# Patient Record
Sex: FEMALE | Race: WHITE | Employment: UNEMPLOYED | URBAN - METROPOLITAN AREA
[De-identification: names, ages, dates, MRNs, and addresses within clinical notes are randomized per-mention and may not be internally consistent; named-entity substitution may affect disease eponyms.]

---

## 2017-05-10 ENCOUNTER — IP HISTORICAL/CONVERTED ENCOUNTER (OUTPATIENT)
Dept: OTHER | Age: 62
End: 2017-05-10

## 2017-05-26 ENCOUNTER — IP HISTORICAL/CONVERTED ENCOUNTER (OUTPATIENT)
Dept: OTHER | Age: 62
End: 2017-05-26

## 2017-06-16 ENCOUNTER — ED HISTORICAL/CONVERTED ENCOUNTER (OUTPATIENT)
Dept: OTHER | Age: 62
End: 2017-06-16

## 2018-01-02 ENCOUNTER — ED HISTORICAL/CONVERTED ENCOUNTER (OUTPATIENT)
Dept: OTHER | Age: 63
End: 2018-01-02

## 2018-01-27 ENCOUNTER — IP HISTORICAL/CONVERTED ENCOUNTER (OUTPATIENT)
Dept: OTHER | Age: 63
End: 2018-01-27

## 2018-03-23 ENCOUNTER — ED HISTORICAL/CONVERTED ENCOUNTER (OUTPATIENT)
Dept: OTHER | Age: 63
End: 2018-03-23

## 2018-08-29 ENCOUNTER — OP HISTORICAL/CONVERTED ENCOUNTER (OUTPATIENT)
Dept: OTHER | Age: 63
End: 2018-08-29

## 2018-09-05 ENCOUNTER — OP HISTORICAL/CONVERTED ENCOUNTER (OUTPATIENT)
Dept: OTHER | Age: 63
End: 2018-09-05

## 2018-09-12 ENCOUNTER — OP HISTORICAL/CONVERTED ENCOUNTER (OUTPATIENT)
Dept: OTHER | Age: 63
End: 2018-09-12

## 2018-10-02 ENCOUNTER — OP HISTORICAL/CONVERTED ENCOUNTER (OUTPATIENT)
Dept: OTHER | Age: 63
End: 2018-10-02

## 2018-10-16 ENCOUNTER — OP HISTORICAL/CONVERTED ENCOUNTER (OUTPATIENT)
Dept: OTHER | Age: 63
End: 2018-10-16

## 2018-11-21 ENCOUNTER — OP HISTORICAL/CONVERTED ENCOUNTER (OUTPATIENT)
Dept: OTHER | Age: 63
End: 2018-11-21

## 2022-03-14 ENCOUNTER — HOSPITAL ENCOUNTER (INPATIENT)
Age: 67
LOS: 7 days | Discharge: HOME HEALTH | DRG: 871 | End: 2022-03-21
Attending: EMERGENCY MEDICINE | Admitting: HOSPITALIST
Payer: MEDICARE

## 2022-03-14 ENCOUNTER — APPOINTMENT (OUTPATIENT)
Dept: CT IMAGING | Age: 67
DRG: 871 | End: 2022-03-14
Attending: EMERGENCY MEDICINE
Payer: MEDICARE

## 2022-03-14 ENCOUNTER — APPOINTMENT (OUTPATIENT)
Dept: GENERAL RADIOLOGY | Age: 67
DRG: 871 | End: 2022-03-14
Attending: EMERGENCY MEDICINE
Payer: MEDICARE

## 2022-03-14 DIAGNOSIS — N39.0 URINARY TRACT INFECTION WITHOUT HEMATURIA, SITE UNSPECIFIED: ICD-10-CM

## 2022-03-14 DIAGNOSIS — R50.9 FEVER, UNSPECIFIED FEVER CAUSE: ICD-10-CM

## 2022-03-14 DIAGNOSIS — G62.9 PERIPHERAL POLYNEUROPATHY: ICD-10-CM

## 2022-03-14 DIAGNOSIS — Z78.9 FAILURE OF OUTPATIENT TREATMENT: ICD-10-CM

## 2022-03-14 DIAGNOSIS — R41.82 ALTERED MENTAL STATUS, UNSPECIFIED ALTERED MENTAL STATUS TYPE: Primary | ICD-10-CM

## 2022-03-14 PROBLEM — A41.9 SEPSIS (HCC): Status: ACTIVE | Noted: 2022-03-14

## 2022-03-14 LAB
AMPHET UR QL SCN: NEGATIVE
ANION GAP SERPL CALC-SCNC: 7 MMOL/L (ref 5–15)
APPEARANCE UR: ABNORMAL
BACTERIA URNS QL MICRO: NEGATIVE /HPF
BACTERIA URNS QL MICRO: NEGATIVE /HPF
BARBITURATES UR QL SCN: POSITIVE
BASOPHILS # BLD: 0.1 K/UL (ref 0–0.1)
BASOPHILS NFR BLD: 0 % (ref 0–1)
BENZODIAZ UR QL: NEGATIVE
BILIRUB UR QL: NEGATIVE
BUN SERPL-MCNC: 15 MG/DL (ref 6–20)
BUN/CREAT SERPL: 15 (ref 12–20)
CA-I BLD-MCNC: 8.6 MG/DL (ref 8.5–10.1)
CANNABINOIDS UR QL SCN: NEGATIVE
CHLORIDE SERPL-SCNC: 102 MMOL/L (ref 97–108)
CK SERPL-CCNC: 139 U/L (ref 26–192)
CO2 SERPL-SCNC: 23 MMOL/L (ref 21–32)
COCAINE UR QL SCN: NEGATIVE
COLOR UR: ABNORMAL
COVID-19 RAPID TEST, COVR: NOT DETECTED
CREAT SERPL-MCNC: 1.01 MG/DL (ref 0.55–1.02)
DIFFERENTIAL METHOD BLD: ABNORMAL
DRUG SCRN COMMENT,DRGCM: ABNORMAL
EOSINOPHIL # BLD: 0 K/UL (ref 0–0.4)
EOSINOPHIL NFR BLD: 0 % (ref 0–7)
ERYTHROCYTE [DISTWIDTH] IN BLOOD BY AUTOMATED COUNT: 13.4 % (ref 11.5–14.5)
FLUAV AG NPH QL IA: NEGATIVE
FLUBV AG NOSE QL IA: NEGATIVE
GLUCOSE BLD STRIP.AUTO-MCNC: 163 MG/DL (ref 65–117)
GLUCOSE SERPL-MCNC: 184 MG/DL (ref 65–100)
GLUCOSE UR STRIP.AUTO-MCNC: NEGATIVE MG/DL
HCT VFR BLD AUTO: 38.6 % (ref 35–47)
HGB BLD-MCNC: 12.4 G/DL (ref 11.5–16)
HGB UR QL STRIP: ABNORMAL
IMM GRANULOCYTES # BLD AUTO: 0.3 K/UL (ref 0–0.04)
IMM GRANULOCYTES NFR BLD AUTO: 1 % (ref 0–0.5)
KETONES UR QL STRIP.AUTO: NEGATIVE MG/DL
LACTATE SERPL-SCNC: 1.7 MMOL/L (ref 0.4–2)
LEUKOCYTE ESTERASE UR QL STRIP.AUTO: ABNORMAL
LYMPHOCYTES # BLD: 1.3 K/UL (ref 0.8–3.5)
LYMPHOCYTES NFR BLD: 5 % (ref 12–49)
MCH RBC QN AUTO: 29.8 PG (ref 26–34)
MCHC RBC AUTO-ENTMCNC: 32.1 G/DL (ref 30–36.5)
MCV RBC AUTO: 92.8 FL (ref 80–99)
METHADONE UR QL: NEGATIVE
MONOCYTES # BLD: 2 K/UL (ref 0–1)
MONOCYTES NFR BLD: 8 % (ref 5–13)
MUCOUS THREADS URNS QL MICRO: ABNORMAL /LPF
NEUTS SEG # BLD: 21.2 K/UL (ref 1.8–8)
NEUTS SEG NFR BLD: 86 % (ref 32–75)
NITRITE UR QL STRIP.AUTO: NEGATIVE
NRBC # BLD: 0 K/UL (ref 0–0.01)
NRBC BLD-RTO: 0 PER 100 WBC
OPIATES UR QL: NEGATIVE
OTHER URINE MICRO,5051: 5
OTHER,OTHU: ABNORMAL
PCP UR QL: NEGATIVE
PERFORMED BY, TECHID: ABNORMAL
PH UR STRIP: 5 [PH] (ref 5–8)
PLATELET # BLD AUTO: 276 K/UL (ref 150–400)
PMV BLD AUTO: 10.6 FL (ref 8.9–12.9)
POTASSIUM SERPL-SCNC: 3.8 MMOL/L (ref 3.5–5.1)
PROT UR STRIP-MCNC: 100 MG/DL
RBC # BLD AUTO: 4.16 M/UL (ref 3.8–5.2)
RBC #/AREA URNS HPF: 20 /HPF (ref 0–5)
RBC #/AREA URNS HPF: ABNORMAL /HPF (ref 0–5)
SODIUM SERPL-SCNC: 132 MMOL/L (ref 136–145)
SP GR UR REFRACTOMETRY: 1.01 (ref 1–1.03)
TROPONIN-HIGH SENSITIVITY: 5 NG/L (ref 0–51)
TSH SERPL DL<=0.05 MIU/L-ACNC: 0.7 UIU/ML (ref 0.36–3.74)
URATE SERPL-MCNC: 5.9 MG/DL (ref 2.6–6)
UROBILINOGEN UR QL STRIP.AUTO: 0.1 EU/DL (ref 0.1–1)
WBC # BLD AUTO: 24.8 K/UL (ref 3.6–11)
WBC URNS QL MICRO: 3858 /HPF (ref 0–4)
WBC URNS QL MICRO: >100 /HPF (ref 0–4)

## 2022-03-14 PROCEDURE — 87635 SARS-COV-2 COVID-19 AMP PRB: CPT

## 2022-03-14 PROCEDURE — 70450 CT HEAD/BRAIN W/O DYE: CPT

## 2022-03-14 PROCEDURE — 83605 ASSAY OF LACTIC ACID: CPT

## 2022-03-14 PROCEDURE — 87804 INFLUENZA ASSAY W/OPTIC: CPT

## 2022-03-14 PROCEDURE — 74011000250 HC RX REV CODE- 250: Performed by: EMERGENCY MEDICINE

## 2022-03-14 PROCEDURE — 74011250636 HC RX REV CODE- 250/636: Performed by: EMERGENCY MEDICINE

## 2022-03-14 PROCEDURE — 74011250636 HC RX REV CODE- 250/636: Performed by: HOSPITALIST

## 2022-03-14 PROCEDURE — 80048 BASIC METABOLIC PNL TOTAL CA: CPT

## 2022-03-14 PROCEDURE — 36415 COLL VENOUS BLD VENIPUNCTURE: CPT

## 2022-03-14 PROCEDURE — 65270000029 HC RM PRIVATE

## 2022-03-14 PROCEDURE — 71045 X-RAY EXAM CHEST 1 VIEW: CPT

## 2022-03-14 PROCEDURE — 93005 ELECTROCARDIOGRAM TRACING: CPT

## 2022-03-14 PROCEDURE — 74011250637 HC RX REV CODE- 250/637: Performed by: HOSPITALIST

## 2022-03-14 PROCEDURE — 87040 BLOOD CULTURE FOR BACTERIA: CPT

## 2022-03-14 PROCEDURE — 96374 THER/PROPH/DIAG INJ IV PUSH: CPT

## 2022-03-14 PROCEDURE — 85025 COMPLETE CBC W/AUTO DIFF WBC: CPT

## 2022-03-14 PROCEDURE — 84443 ASSAY THYROID STIM HORMONE: CPT

## 2022-03-14 PROCEDURE — 74011250637 HC RX REV CODE- 250/637: Performed by: EMERGENCY MEDICINE

## 2022-03-14 PROCEDURE — 80307 DRUG TEST PRSMV CHEM ANLYZR: CPT

## 2022-03-14 PROCEDURE — 81001 URINALYSIS AUTO W/SCOPE: CPT

## 2022-03-14 PROCEDURE — 99285 EMERGENCY DEPT VISIT HI MDM: CPT

## 2022-03-14 PROCEDURE — 74011000250 HC RX REV CODE- 250: Performed by: HOSPITALIST

## 2022-03-14 PROCEDURE — 84484 ASSAY OF TROPONIN QUANT: CPT

## 2022-03-14 PROCEDURE — 82962 GLUCOSE BLOOD TEST: CPT

## 2022-03-14 PROCEDURE — 82550 ASSAY OF CK (CPK): CPT

## 2022-03-14 PROCEDURE — 84550 ASSAY OF BLOOD/URIC ACID: CPT

## 2022-03-14 RX ORDER — ENOXAPARIN SODIUM 100 MG/ML
40 INJECTION SUBCUTANEOUS DAILY
Status: DISCONTINUED | OUTPATIENT
Start: 2022-03-15 | End: 2022-03-21 | Stop reason: HOSPADM

## 2022-03-14 RX ORDER — LANOLIN ALCOHOL/MO/W.PET/CERES
3 CREAM (GRAM) TOPICAL
Status: DISCONTINUED | OUTPATIENT
Start: 2022-03-14 | End: 2022-03-21 | Stop reason: HOSPADM

## 2022-03-14 RX ORDER — PROPRANOLOL HYDROCHLORIDE 60 MG/1
60 TABLET ORAL 3 TIMES DAILY
COMMUNITY
Start: 2021-12-20

## 2022-03-14 RX ORDER — IBUPROFEN 600 MG/1
600 TABLET ORAL
Status: COMPLETED | OUTPATIENT
Start: 2022-03-14 | End: 2022-03-14

## 2022-03-14 RX ORDER — METFORMIN HYDROCHLORIDE 500 MG/1
500 TABLET, EXTENDED RELEASE ORAL DAILY
Status: DISCONTINUED | OUTPATIENT
Start: 2022-03-15 | End: 2022-03-21 | Stop reason: HOSPADM

## 2022-03-14 RX ORDER — ONDANSETRON 2 MG/ML
4 INJECTION INTRAMUSCULAR; INTRAVENOUS
Status: DISCONTINUED | OUTPATIENT
Start: 2022-03-14 | End: 2022-03-21 | Stop reason: HOSPADM

## 2022-03-14 RX ORDER — SODIUM CHLORIDE 0.9 % (FLUSH) 0.9 %
5-40 SYRINGE (ML) INJECTION AS NEEDED
Status: DISCONTINUED | OUTPATIENT
Start: 2022-03-14 | End: 2022-03-21 | Stop reason: HOSPADM

## 2022-03-14 RX ORDER — ALOGLIPTIN 25 MG/1
25 TABLET, FILM COATED ORAL DAILY
Status: DISCONTINUED | OUTPATIENT
Start: 2022-03-15 | End: 2022-03-21 | Stop reason: HOSPADM

## 2022-03-14 RX ORDER — ACETAMINOPHEN 325 MG/1
650 TABLET ORAL
Status: DISCONTINUED | OUTPATIENT
Start: 2022-03-14 | End: 2022-03-21 | Stop reason: HOSPADM

## 2022-03-14 RX ORDER — MELATONIN
2000 DAILY
Status: DISCONTINUED | OUTPATIENT
Start: 2022-03-15 | End: 2022-03-21 | Stop reason: HOSPADM

## 2022-03-14 RX ORDER — PRIMIDONE 250 MG/1
250 TABLET ORAL 3 TIMES DAILY
COMMUNITY
Start: 2022-01-31

## 2022-03-14 RX ORDER — VENLAFAXINE HYDROCHLORIDE 75 MG/1
75 CAPSULE, EXTENDED RELEASE ORAL 3 TIMES DAILY
COMMUNITY
Start: 2022-03-06

## 2022-03-14 RX ORDER — ONDANSETRON 4 MG/1
4 TABLET, ORALLY DISINTEGRATING ORAL
Status: DISCONTINUED | OUTPATIENT
Start: 2022-03-14 | End: 2022-03-21 | Stop reason: HOSPADM

## 2022-03-14 RX ORDER — ACETAMINOPHEN 650 MG/1
650 SUPPOSITORY RECTAL
Status: DISCONTINUED | OUTPATIENT
Start: 2022-03-14 | End: 2022-03-21 | Stop reason: HOSPADM

## 2022-03-14 RX ORDER — LOSARTAN POTASSIUM 25 MG/1
25 TABLET ORAL DAILY
Status: DISCONTINUED | OUTPATIENT
Start: 2022-03-15 | End: 2022-03-20

## 2022-03-14 RX ORDER — VENLAFAXINE HYDROCHLORIDE 75 MG/1
75 CAPSULE, EXTENDED RELEASE ORAL 2 TIMES DAILY
Status: DISCONTINUED | OUTPATIENT
Start: 2022-03-14 | End: 2022-03-21 | Stop reason: HOSPADM

## 2022-03-14 RX ORDER — PRIMIDONE 250 MG/1
250 TABLET ORAL 3 TIMES DAILY
Status: DISCONTINUED | OUTPATIENT
Start: 2022-03-14 | End: 2022-03-21 | Stop reason: HOSPADM

## 2022-03-14 RX ORDER — METFORMIN HYDROCHLORIDE 500 MG/1
500 TABLET, EXTENDED RELEASE ORAL DAILY
COMMUNITY
Start: 2022-03-12

## 2022-03-14 RX ORDER — RISPERIDONE 1 MG/1
4 TABLET, FILM COATED ORAL
Status: DISCONTINUED | OUTPATIENT
Start: 2022-03-14 | End: 2022-03-21 | Stop reason: HOSPADM

## 2022-03-14 RX ORDER — ASPIRIN 81 MG/1
81 TABLET ORAL DAILY
Status: DISCONTINUED | OUTPATIENT
Start: 2022-03-15 | End: 2022-03-21 | Stop reason: HOSPADM

## 2022-03-14 RX ORDER — BACLOFEN 10 MG/1
10 TABLET ORAL 2 TIMES DAILY
Status: DISCONTINUED | OUTPATIENT
Start: 2022-03-14 | End: 2022-03-21 | Stop reason: HOSPADM

## 2022-03-14 RX ORDER — LEVOTHYROXINE SODIUM 100 UG/1
100 TABLET ORAL
Status: DISCONTINUED | OUTPATIENT
Start: 2022-03-15 | End: 2022-03-21 | Stop reason: HOSPADM

## 2022-03-14 RX ORDER — TRAZODONE HYDROCHLORIDE 150 MG/1
150 TABLET ORAL
COMMUNITY
Start: 2022-02-22 | End: 2022-03-21

## 2022-03-14 RX ORDER — GABAPENTIN 300 MG/1
300 CAPSULE ORAL 3 TIMES DAILY
Status: DISCONTINUED | OUTPATIENT
Start: 2022-03-14 | End: 2022-03-21 | Stop reason: HOSPADM

## 2022-03-14 RX ORDER — CLONAZEPAM 0.5 MG/1
0.5 TABLET ORAL 3 TIMES DAILY
Status: DISCONTINUED | OUTPATIENT
Start: 2022-03-14 | End: 2022-03-21 | Stop reason: HOSPADM

## 2022-03-14 RX ORDER — INSULIN LISPRO 100 [IU]/ML
INJECTION, SOLUTION INTRAVENOUS; SUBCUTANEOUS
Status: DISCONTINUED | OUTPATIENT
Start: 2022-03-14 | End: 2022-03-21 | Stop reason: HOSPADM

## 2022-03-14 RX ORDER — DEXTROSE MONOHYDRATE 100 MG/ML
0-250 INJECTION, SOLUTION INTRAVENOUS AS NEEDED
Status: DISCONTINUED | OUTPATIENT
Start: 2022-03-14 | End: 2022-03-21 | Stop reason: HOSPADM

## 2022-03-14 RX ORDER — SODIUM CHLORIDE 0.9 % (FLUSH) 0.9 %
5-40 SYRINGE (ML) INJECTION EVERY 8 HOURS
Status: DISCONTINUED | OUTPATIENT
Start: 2022-03-14 | End: 2022-03-21 | Stop reason: HOSPADM

## 2022-03-14 RX ORDER — RISPERIDONE 4 MG/1
4 TABLET, FILM COATED ORAL
COMMUNITY
Start: 2021-12-31

## 2022-03-14 RX ORDER — LOSARTAN POTASSIUM 25 MG/1
25 TABLET ORAL DAILY
COMMUNITY
Start: 2022-01-02

## 2022-03-14 RX ORDER — FENOFIBRATE 54 MG/1
54 TABLET ORAL DAILY
COMMUNITY
Start: 2022-02-05

## 2022-03-14 RX ORDER — SODIUM CHLORIDE 9 MG/ML
125 INJECTION, SOLUTION INTRAVENOUS CONTINUOUS
Status: DISCONTINUED | OUTPATIENT
Start: 2022-03-14 | End: 2022-03-16

## 2022-03-14 RX ORDER — PROPRANOLOL HYDROCHLORIDE 60 MG/1
60 TABLET ORAL 3 TIMES DAILY
Status: DISCONTINUED | OUTPATIENT
Start: 2022-03-14 | End: 2022-03-16

## 2022-03-14 RX ORDER — GABAPENTIN 300 MG/1
300 CAPSULE ORAL 3 TIMES DAILY
COMMUNITY
Start: 2022-02-01

## 2022-03-14 RX ORDER — ACETAMINOPHEN 325 MG/1
650 TABLET ORAL
Status: COMPLETED | OUTPATIENT
Start: 2022-03-14 | End: 2022-03-14

## 2022-03-14 RX ORDER — LANOLIN ALCOHOL/MO/W.PET/CERES
1000 CREAM (GRAM) TOPICAL DAILY
Status: DISCONTINUED | OUTPATIENT
Start: 2022-03-15 | End: 2022-03-21 | Stop reason: HOSPADM

## 2022-03-14 RX ORDER — MAGNESIUM SULFATE 100 %
4 CRYSTALS MISCELLANEOUS AS NEEDED
Status: DISCONTINUED | OUTPATIENT
Start: 2022-03-14 | End: 2022-03-21 | Stop reason: HOSPADM

## 2022-03-14 RX ORDER — B-COMPLEX WITH VITAMIN C
1 TABLET ORAL DAILY
Status: DISCONTINUED | OUTPATIENT
Start: 2022-03-15 | End: 2022-03-21 | Stop reason: HOSPADM

## 2022-03-14 RX ADMIN — WATER 1 G: 1 INJECTION INTRAMUSCULAR; INTRAVENOUS; SUBCUTANEOUS at 23:02

## 2022-03-14 RX ADMIN — ACETAMINOPHEN 650 MG: 325 TABLET ORAL at 14:35

## 2022-03-14 RX ADMIN — SODIUM CHLORIDE, PRESERVATIVE FREE 10 ML: 5 INJECTION INTRAVENOUS at 23:06

## 2022-03-14 RX ADMIN — VENLAFAXINE HYDROCHLORIDE 75 MG: 75 CAPSULE, EXTENDED RELEASE ORAL at 22:53

## 2022-03-14 RX ADMIN — BACLOFEN 10 MG: 10 TABLET ORAL at 22:53

## 2022-03-14 RX ADMIN — PROPRANOLOL HYDROCHLORIDE 60 MG: 60 TABLET ORAL at 21:33

## 2022-03-14 RX ADMIN — CLONAZEPAM 0.5 MG: 1 TABLET ORAL at 22:44

## 2022-03-14 RX ADMIN — MELATONIN TAB 3 MG 3 MG: 3 TAB at 22:44

## 2022-03-14 RX ADMIN — CEFTRIAXONE 1 G: 1 INJECTION, POWDER, FOR SOLUTION INTRAMUSCULAR; INTRAVENOUS at 14:35

## 2022-03-14 RX ADMIN — PRIMIDONE 250 MG: 250 TABLET ORAL at 22:53

## 2022-03-14 RX ADMIN — SODIUM CHLORIDE 1000 ML: 9 INJECTION, SOLUTION INTRAVENOUS at 23:05

## 2022-03-14 RX ADMIN — SODIUM CHLORIDE 1000 ML: 9 INJECTION, SOLUTION INTRAVENOUS at 14:34

## 2022-03-14 RX ADMIN — ACETAMINOPHEN 650 MG: 325 TABLET ORAL at 22:46

## 2022-03-14 RX ADMIN — IBUPROFEN 600 MG: 600 TABLET ORAL at 14:35

## 2022-03-14 RX ADMIN — RISPERIDONE 4 MG: 2 TABLET, FILM COATED ORAL at 22:46

## 2022-03-14 RX ADMIN — GABAPENTIN 300 MG: 300 CAPSULE ORAL at 22:44

## 2022-03-14 NOTE — ED PROVIDER NOTES
EMERGENCY DEPARTMENT HISTORY AND PHYSICAL EXAM      Date: 3/14/2022  Patient Name: Mishel Baltazar    History of Presenting Illness     Chief Complaint   Patient presents with    Extremity Weakness       History Provided By: Patient and EMS    HPI: Mishel Baltazar, 78 y.o. female with a past medical history significant diabetes, hypertension and Fibromyalgia, sleep apnea, nonspecific psychiatric disorder presents to the ED with cc of altered mental status. Patient was describes generalized weakness is her complaint. Per  per EMS, patient is being treated for UTI currently. Patient denies any pain complaint at this time. There are no other complaints, changes, or physical findings at this time. PCP: Dana Butler MD    No current facility-administered medications on file prior to encounter. Current Outpatient Medications on File Prior to Encounter   Medication Sig Dispense Refill    aspirin delayed-release 81 mg tablet Take  by mouth daily.  b-complex with vitamin c tablet Take 1 tablet by mouth daily.  etodolac (LODINE) 500 mg tablet Take 500 mg by mouth two (2) times a day.  BIOTIN PO Take  by mouth.  tramadol (ULTRAM) 50 mg tablet Take 50 mg by mouth every six (6) hours as needed for Pain.  trazodone (DESYREL) 100 mg tablet Take 100 mg by mouth nightly.  pilocarpine (SALAGEN) 5 mg tablet Take 5 mg by mouth three (3) times daily.  baclofen (LIORESAL) 10 mg tablet Take  by mouth three (3) times daily.  hydroxychloroquine (PLAQUENIL) 200 mg tablet Take 200 mg by mouth daily.  liothyronine (CYTOMEL) 5 mcg tablet Take 5 mcg by mouth daily.  oxyCODONE-acetaminophen (PERCOCET) 5-325 mg per tablet Take 1 Tab by mouth every four (4) hours as needed for Pain. 90 Tab 0    losartan-hydrochlorothiazide (HYZAAR) 100-12.5 mg per tablet Take 0.5 Tabs by mouth daily.  Indications: HYPERTENSION      clonazePAM (KLONOPIN) 0.5 mg tablet Take 1 Tab by mouth three (3) times daily. 90 Tab 0    pantoprazole (PROTONIX) 20 mg tablet Take 20 mg by mouth daily.  cholecalciferol, vitamin D3, (VITAMIN D3) 2,000 unit Tab Take 1 Tab by mouth daily.  atenolol (TENORMIN) 50 mg tablet Take  by mouth nightly.  cyanocobalamin (VITAMIN B-12) 1,000 mcg tablet Take 1,000 mcg by mouth daily.  busPIRone (BUSPAR) 15 mg tablet Take 15 mg by mouth two (2) times a day. Indications: GENERALIZED ANXIETY DISORDER      QUEtiapine (SEROQUEL) 200 mg tablet Take 400 mg by mouth nightly.  DULoxetine (CYMBALTA) 60 mg capsule Take 120 mg by mouth daily.  pregabalin (LYRICA) 150 mg capsule Take 150 mg by mouth three (3) times daily.  metaxalone (SKELAXIN) 800 mg tablet Take 800 mg by mouth four (4) times daily.  levothyroxine (LEVOTHROID) 100 mcg tablet Take 100 mcg by mouth Daily (before breakfast).  metFORMIN (GLUCOPHAGE) 1,000 mg tablet Take 1,000 mg by mouth two (2) times daily as needed. Checks glucose BID and if below 150 does not take it per Dr. Kate Kapoor (endocrinologist pt states)  Indications: TYPE 2 DIABETES MELLITUS      sitaGLIPtin (JANUVIA) 100 mg tablet Take 100 mg by mouth daily. Only takes if glucose is above 150 every morning  Indications: TYPE 2 DIABETES MELLITUS      omega-3 acid ethyl esters (LOVAZA) 1 gram capsule Take 2 g by mouth two (2) times a day.  melatonin 3 mg tablet Take 3 mg by mouth nightly.  colestipol (COLESTID) 1 gram tablet Take 1 g by mouth three (3) times daily.  LIPASE/PROTEASE/AMYLASE (PANCRELIPASE 5000 PO) Take 4 Tabs by mouth three (3) times daily (with meals).          Past History     Past Medical History:  Past Medical History:   Diagnosis Date    Arthritis     Chronic pain     back pain    Chronic pain     fibromyalgia    Diabetes ([address]     steroid induced per pt    Fibromyalgia     chronic pain    GERD (gastroesophageal reflux disease)     Hypercholesteremia     Hypertension     Hypothyroid     hypothyroid    Migraine     Morbid obesity (HCC)     Nausea & vomiting     Other ill-defined conditions(799.89)     extreme dry mouth (due to meds pt stated)    Pernicious anemia     Psychiatric disorder     anxiety and depression    Unspecified sleep apnea     cpap    Urinary incontinence        Past Surgical History:  Past Surgical History:   Procedure Laterality Date    HX APPENDECTOMY      HX BREAST LUMPECTOMY  2011    left    HX CHOLECYSTECTOMY      HX ORTHOPAEDIC      x4 spine surgeries    HX ORTHOPAEDIC  5/28/13    L2-5 DECOMPRESSION AND FUSION POSTERIOR MULTIILEVEL    HX OTHER SURGICAL      laparoscopy - not sure why    HX TOTAL ABDOMINAL HYSTERECTOMY  1981    menorrhagia, dysmenorrhea;  thinks she may have had one ovary removed.  HX UROLOGICAL      surgery for incontinence    HX UROLOGICAL  10/10/14    Urodynamics       Family History:  Family History   Problem Relation Age of Onset    Hypertension Mother     Diabetes Mother     Stroke Mother     Heart Disease Mother     Hypertension Father     Heart Disease Father     Diabetes Father     Stroke Father     Diabetes Brother     Hypertension Brother     Stroke Brother     Heart Disease Brother     Heart Disease Brother     Diabetes Brother     Hypertension Brother     COPD Brother        Social History:  Social History     Tobacco Use    Smoking status: Never Smoker    Smokeless tobacco: Never Used   Substance Use Topics    Alcohol use: Yes     Comment: rare    Drug use: No       Allergies:   Allergies   Allergen Reactions    Other Medication Anaphylaxis and Swelling     Throat closed Unknown pain medication by injection 2 days postop(placed on ventilator for 6 days)    Sulfa (Sulfonamide Antibiotics) Hives and Itching    Ace Inhibitors Other (comments)     Causes liver enzymes to elevate    Bactrim [Sulfamethoprim Ds] Hives         Review of Systems   Review of Systems   Constitutional: Negative for chills and fever. HENT: Negative for sinus pressure and sinus pain. Eyes: Negative for photophobia and redness. Respiratory: Negative for shortness of breath and wheezing. Cardiovascular: Negative for chest pain and palpitations. Gastrointestinal: Negative for abdominal pain and nausea. Genitourinary: Negative for flank pain and hematuria. Musculoskeletal: Negative for arthralgias and gait problem. Skin: Negative for color change and pallor. Neurological: Negative for dizziness and positive for weakness. Review of Systems    Physical Exam   Physical Exam  Constitutional:       General: Somnolent but arousable. Appearance: Normal appearance. Not toxic-appearing. HENT:      Head: Normocephalic and atraumatic. Nose: Nose normal.      Mouth/Throat:      Mouth: Mucous membranes are moist.   Eyes:      Extraocular Movements: Extraocular movements intact. Pupils: Pupils are equal, round, and reactive to light. Cardiovascular:      Rate and Rhythm: Normal rate. Pulses: Normal pulses. Pulmonary:      Effort: Pulmonary effort is normal.      Breath sounds: No stridor. Abdominal:      General: Abdomen is flat. There is no distension. Musculoskeletal:         General: Normal range of motion. Cervical back: Normal range of motion and neck supple. Skin:     General: Skin is warm and dry. Capillary Refill: Capillary refill takes less than 2 seconds. Neurological:      General: No focal deficit present. Mental Status: Alert and oriented to person, place, and time.    Psychiatric:         Mood and Affect: Mood normal.         Behavior: Behavior normal.     Physical Exam    Lab and Diagnostic Study Results     Labs -     Recent Results (from the past 12 hour(s))   CBC WITH AUTOMATED DIFF    Collection Time: 03/14/22  2:14 PM   Result Value Ref Range    WBC 24.8 (H) 3.6 - 11.0 K/uL    RBC 4.16 3.80 - 5.20 M/uL    HGB 12.4 11.5 - 16.0 g/dL    HCT 38.6 35.0 - 47.0 % MCV 92.8 80.0 - 99.0 FL    MCH 29.8 26.0 - 34.0 PG    MCHC 32.1 30.0 - 36.5 g/dL    RDW 13.4 11.5 - 14.5 %    PLATELET 064 603 - 082 K/uL    MPV 10.6 8.9 - 12.9 FL    NRBC 0.0 0.0  WBC    ABSOLUTE NRBC 0.00 0.00 - 0.01 K/uL    NEUTROPHILS 86 (H) 32 - 75 %    LYMPHOCYTES 5 (L) 12 - 49 %    MONOCYTES 8 5 - 13 %    EOSINOPHILS 0 0 - 7 %    BASOPHILS 0 0 - 1 %    IMMATURE GRANULOCYTES 1 (H) 0 - 0.5 %    ABS. NEUTROPHILS 21.2 (H) 1.8 - 8.0 K/UL    ABS. LYMPHOCYTES 1.3 0.8 - 3.5 K/UL    ABS. MONOCYTES 2.0 (H) 0.0 - 1.0 K/UL    ABS. EOSINOPHILS 0.0 0.0 - 0.4 K/UL    ABS. BASOPHILS 0.1 0.0 - 0.1 K/UL    ABS. IMM. GRANS. 0.3 (H) 0.00 - 0.04 K/UL    DF AUTOMATED     METABOLIC PANEL, BASIC    Collection Time: 03/14/22  2:14 PM   Result Value Ref Range    Sodium 132 (L) 136 - 145 mmol/L    Potassium 3.8 3.5 - 5.1 mmol/L    Chloride 102 97 - 108 mmol/L    CO2 23 21 - 32 mmol/L    Anion gap 7 5 - 15 mmol/L    Glucose 184 (H) 65 - 100 mg/dL    BUN 15 6 - 20 mg/dL    Creatinine 1.01 0.55 - 1.02 mg/dL    BUN/Creatinine ratio 15 12 - 20      GFR est AA >60 >60 ml/min/1.73m2    GFR est non-AA 55 (L) >60 ml/min/1.73m2    Calcium 8.6 8.5 - 10.1 mg/dL   TROPONIN-HIGH SENSITIVITY    Collection Time: 03/14/22  2:14 PM   Result Value Ref Range    Troponin-High Sensitivity 5 0 - 51 ng/L   LACTIC ACID    Collection Time: 03/14/22  2:14 PM   Result Value Ref Range    Lactic acid 1.7 0.4 - 2.0 mmol/L   COVID-19 RAPID TEST    Collection Time: 03/14/22  2:14 PM   Result Value Ref Range    COVID-19 rapid test Not Detected Not Detected     INFLUENZA A & B AG (RAPID TEST)    Collection Time: 03/14/22  2:14 PM   Result Value Ref Range    Influenza A Antigen Negative Negative      Influenza B Antigen Negative Negative         Radiologic Studies -   @lastxrresult@  CT Results  (Last 48 hours)               03/14/22 1427  CT HEAD WO CONT Final result    Impression:  Evidence for advanced nonacute end vessel occlusive change.     No intracranial hemorrhage. Narrative:  CT head. Axial images are reviewed along with reformatted sagittal/coronal images. Dose reduction: All CT scans at this facility are performed using dose reduction   optimization techniques as appropriate to a performed exam including the   following-   automated exposure control, adjustments of mA and/or Kv according to patient   size, or use of iterative reconstructive technique. .       Bone windows demonstrate normal aeration image sinuses and mastoid air cells. Prior ophthalmologic surgery. Review of intracranial content reveals asymmetric greater left   periventricular/subcortical patchy low density, evidence for nonacute end vessel   occlusive change. Right thalamic lacune. No hydrocephalus. No intracranial   hemorrhage. CXR Results  (Last 48 hours)               03/14/22 1415  XR CHEST PORT Final result    Narrative:  Chest single view. Overlying soft tissue appears to accentuate reticular markings. No gross   interstitial or alveolar pulmonary edema. Cardiac and mediastinal structures   magnified on this AP view. Thoracic aorta atherosclerosis. No pneumothorax or   sizable pleural effusion. Medical Decision Making   - I am the first provider for this patient. - I reviewed the vital signs, available nursing notes, past medical history, past surgical history, family history and social history. - Initial assessment performed. The patients presenting problems have been discussed, and they are in agreement with the care plan formulated and outlined with them. I have encouraged them to ask questions as they arise throughout their visit. Vital Signs-Reviewed the patient's vital signs.   Patient Vitals for the past 12 hrs:   Temp Pulse Resp BP SpO2   03/14/22 1536 -- 85 24 (!) 110/59 96 %   03/14/22 1418 (!) 102.2 °F (39 °C) 94 18 (!) 108/56 90 %         Disposition   Disposition: Admitted to Floor Medical Floor the case was discussed with the admitting physician     Admitted      Diagnosis     Clinical Impression:   1. Altered mental status, unspecified altered mental status type    2. Fever, unspecified fever cause    3. Urinary tract infection without hematuria, site unspecified    4. Failure of outpatient treatment        Attestations:    Marisue Gitelman, MD    Please note that this dictation was completed with [employer], the computer voice recognition software. Quite often unanticipated grammatical, syntax, homophones, and other interpretive errors are inadvertently transcribed by the computer software. Please disregard these errors. Please excuse any errors that have escaped final proofreading. Thank you.

## 2022-03-14 NOTE — ED NOTES
Bedside and Verbal shift change report given to AILYN Mahan (oncoming nurse) by Herbert Gastelum RN (offgoing nurse). Report included the following information SBAR, ED Summary, MAR and Recent Results.

## 2022-03-15 LAB
ALBUMIN SERPL-MCNC: 2.6 G/DL (ref 3.5–5)
ANION GAP SERPL CALC-SCNC: 9 MMOL/L (ref 5–15)
ATRIAL RATE: 83 BPM
BASOPHILS # BLD: 0 K/UL (ref 0–0.1)
BASOPHILS NFR BLD: 0 % (ref 0–1)
BUN SERPL-MCNC: 19 MG/DL (ref 6–20)
BUN/CREAT SERPL: 15 (ref 12–20)
CA-I BLD-MCNC: 7.5 MG/DL (ref 8.5–10.1)
CALCULATED P AXIS, ECG09: 50 DEGREES
CALCULATED R AXIS, ECG10: -22 DEGREES
CALCULATED T AXIS, ECG11: 27 DEGREES
CHLORIDE SERPL-SCNC: 108 MMOL/L (ref 97–108)
CO2 SERPL-SCNC: 19 MMOL/L (ref 21–32)
CREAT SERPL-MCNC: 1.24 MG/DL (ref 0.55–1.02)
CRP SERPL-MCNC: 25.3 MG/DL (ref 0–0.6)
DIAGNOSIS, 93000: NORMAL
DIFFERENTIAL METHOD BLD: ABNORMAL
EOSINOPHIL # BLD: 0 K/UL (ref 0–0.4)
EOSINOPHIL NFR BLD: 0 % (ref 0–7)
ERYTHROCYTE [DISTWIDTH] IN BLOOD BY AUTOMATED COUNT: 13.7 % (ref 11.5–14.5)
EST. AVERAGE GLUCOSE BLD GHB EST-MCNC: 126 MG/DL
GLUCOSE BLD STRIP.AUTO-MCNC: 110 MG/DL (ref 65–117)
GLUCOSE BLD STRIP.AUTO-MCNC: 122 MG/DL (ref 65–117)
GLUCOSE BLD STRIP.AUTO-MCNC: 167 MG/DL (ref 65–117)
GLUCOSE BLD STRIP.AUTO-MCNC: 168 MG/DL (ref 65–117)
GLUCOSE BLD STRIP.AUTO-MCNC: 171 MG/DL (ref 65–117)
GLUCOSE BLD STRIP.AUTO-MCNC: 173 MG/DL (ref 65–117)
GLUCOSE SERPL-MCNC: 185 MG/DL (ref 65–100)
HBA1C MFR BLD: 6 % (ref 4–5.6)
HCT VFR BLD AUTO: 33.5 % (ref 35–47)
HGB BLD-MCNC: 10.6 G/DL (ref 11.5–16)
IMM GRANULOCYTES # BLD AUTO: 0 K/UL
IMM GRANULOCYTES NFR BLD AUTO: 0 %
LYMPHOCYTES # BLD: 1.3 K/UL (ref 0.8–3.5)
LYMPHOCYTES NFR BLD: 4 % (ref 12–49)
MAGNESIUM SERPL-MCNC: 1.2 MG/DL (ref 1.6–2.4)
MCH RBC QN AUTO: 29.9 PG (ref 26–34)
MCHC RBC AUTO-ENTMCNC: 31.6 G/DL (ref 30–36.5)
MCV RBC AUTO: 94.6 FL (ref 80–99)
MONOCYTES # BLD: 2.5 K/UL (ref 0–1)
MONOCYTES NFR BLD: 8 % (ref 5–13)
NEUTS SEG # BLD: 27.9 K/UL (ref 1.8–8)
NEUTS SEG NFR BLD: 88 % (ref 32–75)
NRBC # BLD: 0 K/UL (ref 0–0.01)
NRBC BLD-RTO: 0 PER 100 WBC
P-R INTERVAL, ECG05: 206 MS
PERFORMED BY, TECHID: ABNORMAL
PERFORMED BY, TECHID: NORMAL
PHOSPHATE SERPL-MCNC: 3.2 MG/DL (ref 2.6–4.7)
PLATELET # BLD AUTO: 255 K/UL (ref 150–400)
PMV BLD AUTO: 11 FL (ref 8.9–12.9)
POTASSIUM SERPL-SCNC: 3.5 MMOL/L (ref 3.5–5.1)
PROCALCITONIN SERPL-MCNC: 13.47 NG/ML
Q-T INTERVAL, ECG07: 378 MS
QRS DURATION, ECG06: 100 MS
QTC CALCULATION (BEZET), ECG08: 444 MS
RBC # BLD AUTO: 3.54 M/UL (ref 3.8–5.2)
RBC MORPH BLD: ABNORMAL
SODIUM SERPL-SCNC: 136 MMOL/L (ref 136–145)
T4 FREE SERPL-MCNC: 1.2 NG/DL (ref 0.8–1.5)
VENTRICULAR RATE, ECG03: 83 BPM
WBC # BLD AUTO: 31.7 K/UL (ref 3.6–11)

## 2022-03-15 PROCEDURE — 80069 RENAL FUNCTION PANEL: CPT

## 2022-03-15 PROCEDURE — 85025 COMPLETE CBC W/AUTO DIFF WBC: CPT

## 2022-03-15 PROCEDURE — 74011250637 HC RX REV CODE- 250/637: Performed by: HOSPITALIST

## 2022-03-15 PROCEDURE — 84439 ASSAY OF FREE THYROXINE: CPT

## 2022-03-15 PROCEDURE — 77010033678 HC OXYGEN DAILY

## 2022-03-15 PROCEDURE — 92610 EVALUATE SWALLOWING FUNCTION: CPT

## 2022-03-15 PROCEDURE — 87086 URINE CULTURE/COLONY COUNT: CPT

## 2022-03-15 PROCEDURE — 74011000250 HC RX REV CODE- 250: Performed by: HOSPITALIST

## 2022-03-15 PROCEDURE — 74011250637 HC RX REV CODE- 250/637: Performed by: INTERNAL MEDICINE

## 2022-03-15 PROCEDURE — 83735 ASSAY OF MAGNESIUM: CPT

## 2022-03-15 PROCEDURE — 86140 C-REACTIVE PROTEIN: CPT

## 2022-03-15 PROCEDURE — 74011250636 HC RX REV CODE- 250/636: Performed by: HOSPITALIST

## 2022-03-15 PROCEDURE — 87077 CULTURE AEROBIC IDENTIFY: CPT

## 2022-03-15 PROCEDURE — 87186 SC STD MICRODIL/AGAR DIL: CPT

## 2022-03-15 PROCEDURE — 84145 PROCALCITONIN (PCT): CPT

## 2022-03-15 PROCEDURE — 74011636637 HC RX REV CODE- 636/637: Performed by: HOSPITALIST

## 2022-03-15 PROCEDURE — 36415 COLL VENOUS BLD VENIPUNCTURE: CPT

## 2022-03-15 PROCEDURE — 65610000006 HC RM INTENSIVE CARE

## 2022-03-15 PROCEDURE — 74011250636 HC RX REV CODE- 250/636: Performed by: INTERNAL MEDICINE

## 2022-03-15 PROCEDURE — 83036 HEMOGLOBIN GLYCOSYLATED A1C: CPT

## 2022-03-15 PROCEDURE — 82962 GLUCOSE BLOOD TEST: CPT

## 2022-03-15 RX ORDER — SODIUM CHLORIDE 9 MG/ML
250 INJECTION, SOLUTION INTRAVENOUS CONTINUOUS
Status: DISCONTINUED | OUTPATIENT
Start: 2022-03-15 | End: 2022-03-16

## 2022-03-15 RX ORDER — NOREPINEPHRINE BIT/0.9 % NACL 8 MG/250ML
.5-12 INFUSION BOTTLE (ML) INTRAVENOUS
Status: DISCONTINUED | OUTPATIENT
Start: 2022-03-15 | End: 2022-03-16

## 2022-03-15 RX ORDER — NOREPINEPHRINE BITARTRATE 1 MG/ML
INJECTION, SOLUTION INTRAVENOUS
Status: DISPENSED
Start: 2022-03-15 | End: 2022-03-15

## 2022-03-15 RX ORDER — LANOLIN ALCOHOL/MO/W.PET/CERES
400 CREAM (GRAM) TOPICAL 2 TIMES DAILY
Status: DISCONTINUED | OUTPATIENT
Start: 2022-03-15 | End: 2022-03-21 | Stop reason: HOSPADM

## 2022-03-15 RX ORDER — MAGNESIUM SULFATE HEPTAHYDRATE 40 MG/ML
2 INJECTION, SOLUTION INTRAVENOUS ONCE
Status: COMPLETED | OUTPATIENT
Start: 2022-03-15 | End: 2022-03-15

## 2022-03-15 RX ADMIN — GABAPENTIN 300 MG: 300 CAPSULE ORAL at 21:35

## 2022-03-15 RX ADMIN — BACLOFEN 10 MG: 10 TABLET ORAL at 09:30

## 2022-03-15 RX ADMIN — SODIUM CHLORIDE, PRESERVATIVE FREE 10 ML: 5 INJECTION INTRAVENOUS at 21:36

## 2022-03-15 RX ADMIN — SODIUM CHLORIDE, PRESERVATIVE FREE 10 ML: 5 INJECTION INTRAVENOUS at 05:15

## 2022-03-15 RX ADMIN — SODIUM CHLORIDE 125 ML/HR: 9 INJECTION, SOLUTION INTRAVENOUS at 14:26

## 2022-03-15 RX ADMIN — WATER 1 G: 1 INJECTION INTRAMUSCULAR; INTRAVENOUS; SUBCUTANEOUS at 09:29

## 2022-03-15 RX ADMIN — WATER 1 G: 1 INJECTION INTRAMUSCULAR; INTRAVENOUS; SUBCUTANEOUS at 21:34

## 2022-03-15 RX ADMIN — Medication 1000 MCG: at 08:23

## 2022-03-15 RX ADMIN — BACLOFEN 10 MG: 10 TABLET ORAL at 21:35

## 2022-03-15 RX ADMIN — ACETAMINOPHEN 650 MG: 325 TABLET ORAL at 23:39

## 2022-03-15 RX ADMIN — MAGNESIUM SULFATE HEPTAHYDRATE 2 G: 2 INJECTION, SOLUTION INTRAVENOUS at 18:18

## 2022-03-15 RX ADMIN — GABAPENTIN 300 MG: 300 CAPSULE ORAL at 16:12

## 2022-03-15 RX ADMIN — PRIMIDONE 250 MG: 250 TABLET ORAL at 21:35

## 2022-03-15 RX ADMIN — Medication 2000 UNITS: at 08:24

## 2022-03-15 RX ADMIN — ALOGLIPTIN 25 MG: 25 TABLET, FILM COATED ORAL at 08:24

## 2022-03-15 RX ADMIN — SODIUM CHLORIDE 250 ML/HR: 9 INJECTION, SOLUTION INTRAVENOUS at 05:07

## 2022-03-15 RX ADMIN — SODIUM CHLORIDE 125 ML/HR: 9 INJECTION, SOLUTION INTRAVENOUS at 00:00

## 2022-03-15 RX ADMIN — RISPERIDONE 4 MG: 2 TABLET, FILM COATED ORAL at 21:35

## 2022-03-15 RX ADMIN — Medication 10 MCG/MIN: at 02:35

## 2022-03-15 RX ADMIN — SODIUM CHLORIDE 1000 ML: 9 INJECTION, SOLUTION INTRAVENOUS at 00:44

## 2022-03-15 RX ADMIN — ACETAMINOPHEN 650 MG: 325 TABLET ORAL at 16:44

## 2022-03-15 RX ADMIN — MELATONIN TAB 3 MG 3 MG: 3 TAB at 21:35

## 2022-03-15 RX ADMIN — ASPIRIN 81 MG: 81 TABLET, COATED ORAL at 08:24

## 2022-03-15 RX ADMIN — GABAPENTIN 300 MG: 300 CAPSULE ORAL at 08:24

## 2022-03-15 RX ADMIN — INSULIN LISPRO 3 UNITS: 100 INJECTION, SOLUTION INTRAVENOUS; SUBCUTANEOUS at 11:33

## 2022-03-15 RX ADMIN — PANCRELIPASE 1 CAPSULE: 60000; 12000; 38000 CAPSULE, DELAYED RELEASE PELLETS ORAL at 08:26

## 2022-03-15 RX ADMIN — PRIMIDONE 250 MG: 250 TABLET ORAL at 09:30

## 2022-03-15 RX ADMIN — Medication 15 MCG/MIN: at 08:25

## 2022-03-15 RX ADMIN — MAGNESIUM OXIDE TAB 400 MG (241.3 MG ELEMENTAL MG) 400 MG: 400 (241.3 MG) TAB at 21:35

## 2022-03-15 RX ADMIN — LEVOTHYROXINE SODIUM 100 MCG: 0.1 TABLET ORAL at 08:23

## 2022-03-15 RX ADMIN — CLONAZEPAM 0.5 MG: 1 TABLET ORAL at 08:24

## 2022-03-15 RX ADMIN — SODIUM CHLORIDE, PRESERVATIVE FREE 10 ML: 5 INJECTION INTRAVENOUS at 13:46

## 2022-03-15 RX ADMIN — CLONAZEPAM 0.5 MG: 1 TABLET ORAL at 16:12

## 2022-03-15 RX ADMIN — CLONAZEPAM 0.5 MG: 1 TABLET ORAL at 21:35

## 2022-03-15 RX ADMIN — PRIMIDONE 250 MG: 250 TABLET ORAL at 16:12

## 2022-03-15 RX ADMIN — PANCRELIPASE 1 CAPSULE: 60000; 12000; 38000 CAPSULE, DELAYED RELEASE PELLETS ORAL at 16:12

## 2022-03-15 RX ADMIN — PANCRELIPASE 1 CAPSULE: 60000; 12000; 38000 CAPSULE, DELAYED RELEASE PELLETS ORAL at 11:33

## 2022-03-15 RX ADMIN — VENLAFAXINE HYDROCHLORIDE 75 MG: 75 CAPSULE, EXTENDED RELEASE ORAL at 08:23

## 2022-03-15 RX ADMIN — INSULIN LISPRO 3 UNITS: 100 INJECTION, SOLUTION INTRAVENOUS; SUBCUTANEOUS at 08:23

## 2022-03-15 RX ADMIN — VENLAFAXINE HYDROCHLORIDE 75 MG: 75 CAPSULE, EXTENDED RELEASE ORAL at 21:35

## 2022-03-15 RX ADMIN — ENOXAPARIN SODIUM 40 MG: 100 INJECTION SUBCUTANEOUS at 13:41

## 2022-03-15 RX ADMIN — LURASIDONE HYDROCHLORIDE 60 MG: 40 TABLET, FILM COATED ORAL at 23:39

## 2022-03-15 RX ADMIN — Medication 1 TABLET: at 09:30

## 2022-03-15 NOTE — ROUTINE PROCESS
TRANSFER - OUT REPORT:    Verbal report given to AILYN Blackwell on Yolanda Head  being transferred to [facility] [phone] for routine progression of care       Report consisted of patients Situation, Background, Assessment and   Recommendations(SBAR). Information from the following report(s) SBAR and ED Summary was reviewed with the receiving nurse. Lines:   Peripheral IV 03/14/22 Left Antecubital (Active)        Opportunity for questions and clarification was provided.       Patient transported with:   O2 @ 4 liters  Tech

## 2022-03-15 NOTE — PROGRESS NOTES
Reason for Admission:  Sepsis & UTI                     RUR Score:     12% Low                Plan for utilizing home health:      Prior HH in the past.      PCP: First and Last name:  Gary Aponte MD     Name of Practice:    Are you a current patient: Yes/No: Yes   Approximate date of last visit: 3 weeks   Can you participate in a virtual visit with your PCP:                     Current Advanced Directive/Advance Care Plan: Full Code      Healthcare Decision Maker:     Spouse Ankita Cormier, @ [phone]      Click here to [address] including selection of the Healthcare Decision Maker Relationship (ie \"Primary\")                             Transition of Care Plan:                      Patient and spouse reside in a single level home. Last seen in PCP office x 3 weeks ago approximately. Utilizes [facility] [employer]). Requires assistance from spouse w/all ADL's & IADL's. No home O2 or DME. Patient doesn't drive and relies on spouse for all of transportation needs. [facility]. No prior IRF in the past.  Patient to await therapy eval and recs. Spouse to transport at time of discharge. Will require Medicare 2nd IMM letter. Care Management Interventions  PCP Verified by CM: Yes (3 weeks ago)  Mode of Transport at Discharge:  Other (see comment) (Family)  Transition of Care Consult (CM Consult): Discharge Planning  Discharge Durable Medical Equipment: No (No home O2 or DME)  Support Systems: Spouse/Significant Other  Confirm Follow Up Transport: Family  Discharge Location  Patient Expects to be Discharged to[de-identified]  (TBD)          JUANITA Franco

## 2022-03-15 NOTE — ED NOTES
Assumed care of patient. Patient awake and alert; no acute distress; no respiratory distress. Oriented to place, person, and year.

## 2022-03-15 NOTE — PROGRESS NOTES
Patient presented to the floor hypotensive and lethargic. !000 mL 0.9% NS bolus finishing on arrival. Dr. Brigette Miramontes paged and made aware of patient status. Gave orders for another 1000  ML 0.9% NS bolus. Bolus administered without any improvement in pressure or mentation. Patient lethargic and responsive to pain. Dr. Brigette Miramontes paged again and made aware of no improvement. Gave order to transfer patient to ICU. Stated she would see patient in ICU and put in orders. Family called and made aware of patient condition and transfer to ICU. Ramirez Numbers (son) [phone].

## 2022-03-15 NOTE — ED NOTES
When rounding on patient, patient found to have taken off cardiac monitoring, NC O2, and IV. Placed patient back on O2, and reapplied cardiac monitoring. Patient with intermittent confusion, which is reported by the patient's family via telephone.

## 2022-03-15 NOTE — PROGRESS NOTES
Hospitalist Progress Note               Daily Progress Note: 3/15/2022  Chief Complaint : Altered mental status  Per HPI: 78 y.o. female with a history of diabetes, hypothyroidism, hypertension presents to the emergency room complaining of altered mental status as per . But by the time she came to the ED she was very weak and slow and lethargic but able to wake up and give information. She has difficult time to talk due to the tiredness and dryness. Mouth is getting sticky due to dryness. States started not feeling good 2 weeks ago, treated with antibiotic but when she finished that she started having again symptoms of urinary tract infection. States its frequency and dysuria. Cannot tell if there is any hematuria. And started feeling very lethargy and tiredness. Evaluation in the emergency room found with significant leukocytosis with left shift, hypotension, temperature 102.2. Suspected urinary tract infection, urine that was collected looks very cloudy and dark. She is given fluid bolus with mild improvement in the blood pressure. Admitted to medical telemetry but noted to have hypotension which did not respond to the fluid bolus, transfer to ICU. In ICU she was very dusky with low blood pressure with complete altered mental status and unresponsive. Continued on IV fluids and started on Levophed now she is little better with open eyes and blood pressure improved.     At baseline she is needing help even for bathing due to history of multiple back surgeries. Ambulation is very limited. Subjective: The patient is seen for follow  up. She is alert,  Was just weaned off levophed this afternoon,  Remains pleasantly confused.   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  ICD-9-CM: 618.00  10/10/2014 - Present        Rectocele ICD-10-CM: N81.6  ICD-9-CM: 618.04  10/10/2014 - Present        DM (diabetes mellitus) ([facility] ICD-10-CM: E11.9  ICD-9-CM: 250.00  5/29/2013 - Present        Unspecified hypothyroidism ICD-10-CM: E03.9  ICD-9-CM: 244.9  5/29/2013 - Present        HTN (hypertension) ICD-10-CM: I10  ICD-9-CM: 401.9  5/29/2013 - Present        Fibromyalgia ICD-10-CM: M79.7  ICD-9-CM: 729.1  5/29/2013 - Present              Medications reviewed  Current Facility-Administered Medications   Medication Dose Route Frequency    NOREPINephrine (LEVOPHED) 8 mg in 0.9% NS 250ml infusion  0.5-120 mcg/min IntraVENous TITRATE    0.9% sodium chloride infusion  250 mL/hr IntraVENous CONTINUOUS    venlafaxine-SR (EFFEXOR-XR) capsule 75 mg  75 mg Oral BID    cholecalciferol (VITAMIN D3) (1000 Units /25 mcg) tablet 2,000 Units  2,000 Units Oral DAILY    cyanocobalamin tablet 1,000 mcg  1,000 mcg Oral DAILY    levothyroxine (SYNTHROID) tablet 100 mcg  100 mcg Oral ACB    alogliptin (NESINA) tablet 25 mg  25 mg Oral DAILY    melatonin tablet 3 mg  3 mg Oral QHS    lipase-protease-amylase (CREON 12,000) capsule 1 Capsule  1 Capsule Oral TID WITH MEALS    clonazePAM (KlonoPIN) tablet 0.5 mg  0.5 mg Oral TID    aspirin delayed-release tablet 81 mg  81 mg Oral DAILY    b-complex with vitamin c tablet 1 Tablet  1 Tablet Oral DAILY    baclofen (LIORESAL) tablet 10 mg  10 mg Oral BID    [Held by provider] metFORMIN ER (GLUCOPHAGE XR) tablet 500 mg  500 mg Oral DAILY    [Held by provider] losartan (COZAAR) tablet 25 mg  25 mg Oral DAILY    [Held by provider] propranoloL (INDERAL) tablet 60 mg  60 mg Oral TID    gabapentin (NEURONTIN) capsule 300 mg  300 mg Oral TID    primidone (MYSOLINE) tablet 250 mg  250 mg Oral TID    risperiDONE (RisperDAL) tablet 4 mg  4 mg Oral QHS    sodium chloride (NS) flush 5-40 mL  5-40 mL IntraVENous Q8H    sodium chloride (NS) flush 5-40 mL  5-40 mL IntraVENous PRN    acetaminophen (TYLENOL) tablet 650 mg  650 mg Oral Q6H PRN    Or    acetaminophen (TYLENOL) suppository 650 mg  650 mg Rectal Q6H PRN    ondansetron (ZOFRAN ODT) tablet 4 mg  4 mg Oral Q6H PRN    Or    ondansetron (ZOFRAN) injection 4 mg  4 mg IntraVENous Q6H PRN    enoxaparin (LOVENOX) injection 40 mg  40 mg SubCUTAneous DAILY    0.9% sodium chloride infusion  125 mL/hr IntraVENous CONTINUOUS    insulin lispro (HUMALOG) injection   SubCUTAneous AC&HS    glucose chewable tablet 16 g  4 Tablet Oral PRN    dextrose 10% infusion 0-250 mL  0-250 mL IntraVENous PRN    glucagon (GLUCAGEN) injection 1 mg  1 mg IntraMUSCular PRN    cefTRIAXone (ROCEPHIN) 1 g in sterile water (preservative free) 10 mL IV syringe  1 g IntraVENous Q12H       Review of Systems:   Review of systems not obtained due to patient factors. Objective:   Physical Exam:     Visit Vitals  BP (!) 105/59 (BP 1 Location: Right upper arm, BP Patient Position: At rest)   Pulse 81   Temp 100.2 °F (37.9 °C)   Resp 19   Ht 5' 3\" (1.6 m)   Wt 83.7 kg (184 lb 8.4 oz)   SpO2 99%   Breastfeeding No   BMI 32.69 kg/m²    O2 Flow Rate (L/min): 5 l/min (decreased to 4 lpm nc) O2 Device: Nasal cannula    Temp (24hrs), Av.2 °F (37.9 °C), Min:98.6 °F (37 °C), Max:101.7 °F (38.7 °C)    03/15 0701 - 03/15 1900  In: -   Out: 9838 [NPGZR:[id number]]   1901 - 03/15 0700  In: 1000 [I.V.:1000]  Out: 345 [Urine:345]    General:  Alert, cooperative, no distress, appears stated age. Head:  Normocephalic, without obvious abnormality, atraumatic. Eyes:  Conjunctivae/corneas clear. PERRL, EOMs intact. Throat: Moist oral mucosa   Neck: Supple, symmetrical, trachea midline, no adenopathy, no JVD.    Lungs:   Clear to auscultation bilaterally. diminished bases lr?l   Chest wall:  No tenderness or deformity. Heart:  Regular rate and rhythm, S1, S2 normal, no murmur, click, rub or gallop. Abdomen:   Soft, non-tender, not distended. Bowel sounds present, No rebound or guarding. Extremities: Extremities normal, atraumatic, no cyanosis. No edema   Pulses: 2+ and symmetric all extremities. Skin: Warm,dry   Neurologic: CNII-XII intact. No motor or sensory deficits. Data Review:       Recent Days:  Recent Labs     03/15/22  0523 03/14/22  1414   WBC 31.7* 24.8*   HGB 10.6* 12.4   HCT 33.5* 38.6    276     Recent Labs     03/15/22  0523 03/14/22  1414    132*   K 3.5 3.8    102   CO2 19* 23   * 184*   BUN 19 15   CREA 1.24* 1.01   CA 7.5* 8.6   MG 1.2*  --    PHOS 3.2  --    ALB 2.6*  --      No results for input(s): PH, PCO2, PO2, HCO3, FIO2 in the last 72 hours.     24 Hour Results:  Recent Results (from the past 24 hour(s))   URINALYSIS W/ RFLX MICROSCOPIC    Collection Time: 03/14/22  8:08 PM   Result Value Ref Range    Color Yellow/Straw      Appearance Turbid (A) Clear      Specific gravity 1.013 1.003 - 1.030      pH (UA) 5.0 5.0 - 8.0      Protein 100 (A) Negative mg/dL    Glucose Negative Negative mg/dL    Ketone Negative Negative mg/dL    Bilirubin Negative Negative      Blood Small (A) Negative      Urobilinogen 0.1 0.1 - 1.0 EU/dL    Nitrites Negative Negative      Leukocyte Esterase Large (A) Negative      WBC >100 (H) 0 - 4 /hpf    RBC 20-50 0 - 5 /hpf    Bacteria Negative Negative /hpf    Mucus Trace /lpf    Other Urine Micro 5     URINE MICROSCOPIC    Collection Time: 03/14/22  8:08 PM   Result Value Ref Range    WBC 3,858 (H) 0 - 4 /hpf    RBC 20 (H) 0 - 5 /hpf    Bacteria Negative Negative /hpf    Other: Renal Epithelial cells present     DRUG SCREEN, URINE    Collection Time: 03/14/22  8:09 PM   Result Value Ref Range    AMPHETAMINES Negative Negative      BARBITURATES Positive (A) Negative BENZODIAZEPINES Negative Negative      COCAINE Negative Negative      METHADONE Negative Negative      OPIATES Negative Negative      PCP(PHENCYCLIDINE) Negative Negative      THC (TH-CANNABINOL) Negative Negative      Drug screen comment        This test is a screen for drugs of abuse in a medical setting only (i.e., they are unconfirmed results and as such must not be used for non-medical purposes, e.g.,employment testing, legal testing). Due to its inherent nature, false positive (FP) and false negative (FN) results may be obtained. Therefore, if necessary for medical care, recommend confirmation of positive findings by GC/MS.    TSH 3RD GENERATION    Collection Time: 03/14/22  9:30 PM   Result Value Ref Range    TSH 0.70 0.36 - 3.74 uIU/mL   URIC ACID    Collection Time: 03/14/22  9:30 PM   Result Value Ref Range    Uric acid 5.9 2.6 - 6.0 mg/dL   CK    Collection Time: 03/14/22  9:30 PM   Result Value Ref Range     26 - 192 U/L   GLUCOSE, POC    Collection Time: 03/14/22 11:13 PM   Result Value Ref Range    Glucose (POC) 163 (H) 65 - 117 mg/dL    Performed by [employer]    GLUCOSE, POC    Collection Time: 03/15/22  1:06 AM   Result Value Ref Range    Glucose (POC) 171 (H) 65 - 117 mg/dL    Performed by [address] [place] [address] Road, POC    Collection Time: 03/15/22  3:39 AM   Result Value Ref Range    Glucose (POC) 167 (H) 65 - 117 mg/dL    [facility]    CBC WITH AUTOMATED DIFF    Collection Time: 03/15/22  5:23 AM   Result Value Ref Range    WBC 31.7 (H) 3.6 - 11.0 K/uL    RBC 3.54 (L) 3.80 - 5.20 M/uL    HGB 10.6 (L) 11.5 - 16.0 g/dL    HCT 33.5 (L) 35.0 - 47.0 %    MCV 94.6 80.0 - 99.0 FL    MCH 29.9 26.0 - 34.0 PG    MCHC 31.6 30.0 - 36.5 g/dL    RDW 13.7 11.5 - 14.5 %    PLATELET 363 757 - 977 K/uL    MPV 11.0 8.9 - 12.9 FL    NRBC 0.0 0.0  WBC    ABSOLUTE NRBC 0.00 0.00 - 0.01 K/uL    NEUTROPHILS 88 (H) 32 - 75 %    LYMPHOCYTES 4 (L) 12 - 49 %    MONOCYTES 8 5 - 13 % EOSINOPHILS 0 0 - 7 %    BASOPHILS 0 0 - 1 %    IMMATURE GRANULOCYTES 0 %    ABS. NEUTROPHILS 27.9 (H) 1.8 - 8.0 K/UL    ABS. LYMPHOCYTES 1.3 0.8 - 3.5 K/UL    ABS. MONOCYTES 2.5 (H) 0.0 - 1.0 K/UL    ABS. EOSINOPHILS 0.0 0.0 - 0.4 K/UL    ABS. BASOPHILS 0.0 0.0 - 0.1 K/UL    ABS. IMM.  GRANS. 0.0 K/UL    DF Manual      RBC COMMENTS Normocytic, Normochromic     RENAL FUNCTION PANEL    Collection Time: 03/15/22  5:23 AM   Result Value Ref Range    Sodium 136 136 - 145 mmol/L    Potassium 3.5 3.5 - 5.1 mmol/L    Chloride 108 97 - 108 mmol/L    CO2 19 (L) 21 - 32 mmol/L    Anion gap 9 5 - 15 mmol/L    Glucose 185 (H) 65 - 100 mg/dL    BUN 19 6 - 20 mg/dL    Creatinine 1.24 (H) 0.55 - 1.02 mg/dL    BUN/Creatinine ratio 15 12 - 20      GFR est AA 52 (L) >60 ml/min/1.73m2    GFR est non-AA 43 (L) >60 ml/min/1.73m2    Calcium 7.5 (L) 8.5 - 10.1 mg/dL    Phosphorus 3.2 2.6 - 4.7 mg/dL    Albumin 2.6 (L) 3.5 - 5.0 g/dL   PROCALCITONIN    Collection Time: 03/15/22  5:23 AM   Result Value Ref Range    Procalcitonin 13.47 (H) 0 ng/mL   C REACTIVE PROTEIN, QT    Collection Time: 03/15/22  5:23 AM   Result Value Ref Range    C-Reactive protein 25.30 (H) 0.00 - 0.60 mg/dL   T4, FREE    Collection Time: 03/15/22  5:23 AM   Result Value Ref Range    T4, Free 1.2 0.8 - 1.5 ng/dL   MAGNESIUM    Collection Time: 03/15/22  5:23 AM   Result Value Ref Range    Magnesium 1.2 (L) 1.6 - 2.4 mg/dL   HEMOGLOBIN A1C WITH EAG    Collection Time: 03/15/22  5:23 AM   Result Value Ref Range    Hemoglobin A1c 6.0 (H) 4.0 - 5.6 %    Est. average glucose 126 mg/dL   GLUCOSE, POC    Collection Time: 03/15/22  7:25 AM   Result Value Ref Range    Glucose (POC) 168 (H) 65 - 117 mg/dL    Performed by Stephen Neither    GLUCOSE, POC    Collection Time: 03/15/22 11:20 AM   Result Value Ref Range    Glucose (POC) 173 (H) 65 - 117 mg/dL    Performed by Stephen Neither    GLUCOSE, POC    Collection Time: 03/15/22  3:44 PM   Result Value Ref Range    Glucose (POC) 122 (H) 65 - 117 mg/dL    Performed by Dinah Melendez        chest X-ray    Assessment/     Patient Active Problem List   Diagnosis Code    DM (diabetes mellitus) ([facility] E11.9    Unspecified hypothyroidism E03.9    HTN (hypertension) I10    Fibromyalgia M79.7    Urinary incontinence R32    Vaginal vault prolapse N81.9    Rectocele N81.6    Sepsis ([facility] A41.9    UTI (urinary tract infection) N39.0           Septic shock:   suspect uti contributing/reportedly failed outpatient. levophed transiently off this afternoon 3/15. Ceftriaxone initiated in ED, continues, wbc rising  cx ID  No diarrhea and cxr unrevealing  Follow blcs, ng @ 4 hrs  Follow ucs pending  Judicious hydration       Diabetes mellitus type 2:   Pta On small dose of Metformin and sitagliptin 100 mg daily. Resumed with nesina. Metformin held. Continue ssi/hypoglycemia protocol    Severe dehydration:   Likely secondary to poor oral intake,   Continue judicious hydration     Significant neuropathy secondary to history of back problems with surgeries, on multiple medications which we will continue as condition dictates     Hypothyroidism:   Continues synthroid,  tsh wnl     Benign essential hypertension: On propranolol and Cozaar, held in lieu of hypotension/shock. restart when condition dictates. Hypomag:  Replete and follow  Mag ox bid     Full code  Vtep: lovenox  Dispo: pending course, continue in icu tonight, out of icu in am if vss.         Care Plan discussed with: Patient/Family and Nurse    Total time spent with patient: 30 minutes. This dictation was done by dragon, computer voice recognition software. Often unanticipated grammatical, syntax, phones and other interpretive errors are inadvertently transcribed. Please excuse errors that have escaped final proofreading.     Zarina Bassett MD

## 2022-03-15 NOTE — H&P
History and Physical              Subjective :   Chief Complaint : Altered mental status, with a sepsis    Source of information : Patient, ED provider, previous records. History of present illness:   78 y.o. female with a history of diabetes, hypothyroidism, hypertension presents to the emergency room complaining of altered mental status as per . But by the time she came to the ED she was very weak and slow and lethargic but able to wake up and give information. She has difficult time to talk due to the tiredness and dryness. Mouth is getting sticky due to dryness. States started not feeling good 2 weeks ago, treated with antibiotic but when she finished that she started having again symptoms of urinary tract infection. States its frequency and dysuria. Cannot tell if there is any hematuria. And started feeling very lethargy and tiredness. Evaluation in the emergency room found with significant leukocytosis with left shift, hypotension, temperature 102.2. Suspected urinary tract infection, urine that was collected looks very cloudy and dark. She is given fluid bolus with mild improvement in the blood pressure. Admitted to medical telemetry but noted to have hypotension which did not respond to the fluid bolus, transfer to ICU. In ICU she was very dusky with low blood pressure with complete altered mental status and unresponsive. Continued on IV fluids and started on Levophed now she is little better with open eyes and blood pressure improved. At baseline she is needing help even for bathing due to history of multiple back surgeries. Ambulation is very limited.     Past Medical History:   Diagnosis Date    Arthritis     Chronic pain     back pain    Chronic pain     fibromyalgia    Diabetes ([facility] [address]     steroid induced per pt    Fibromyalgia     chronic pain    GERD (gastroesophageal reflux disease)     Hypercholesteremia     Hypertension     Hypothyroid     hypothyroid    Migraine  Morbid obesity ([facility] [address]     Other ill-defined conditions(799.89)     extreme dry mouth (due to meds pt stated)    Pernicious anemia     Psychiatric disorder     anxiety and depression    Unspecified sleep apnea     cpap    Urinary incontinence      Past Surgical History:   Procedure Laterality Date    HX APPENDECTOMY      HX BREAST LUMPECTOMY  2011    left    HX CHOLECYSTECTOMY      HX ORTHOPAEDIC      x4 spine surgeries    HX ORTHOPAEDIC  5/28/13    L2-5 DECOMPRESSION AND FUSION POSTERIOR MULTIILEVEL    HX OTHER SURGICAL      laparoscopy - not sure why    HX TOTAL ABDOMINAL HYSTERECTOMY  1981    menorrhagia, dysmenorrhea;  thinks she may have had one ovary removed.  HX UROLOGICAL      surgery for incontinence    HX UROLOGICAL  10/10/14    Urodynamics     Family History   Problem Relation Age of Onset    Hypertension Mother     Diabetes Mother     Stroke Mother     Heart Disease Mother     Hypertension Father     Heart Disease Father     Diabetes Father     Stroke Father     Diabetes Brother     Hypertension Brother     Stroke Brother     Heart Disease Brother     Heart Disease Brother     Diabetes Brother     Hypertension Brother     COPD Brother       Social History     Tobacco Use    Smoking status: Never Smoker    Smokeless tobacco: Never Used   Substance Use Topics    Alcohol use: Yes     Comment: rare       Prior to Admission medications    Medication Sig Start Date End Date Taking? Authorizing Provider   metFORMIN ER (GLUCOPHAGE XR) 500 mg tablet Take 500 mg by mouth daily. 3/12/22   Provider, Historical   fenofibrate (LOFIBRA) 54 mg tablet Take 54 mg by mouth daily. 2/5/22   Provider, Historical   losartan (COZAAR) 25 mg tablet Take 25 mg by mouth daily. 1/2/22   Provider, Historical   propranoloL (INDERAL) 60 mg tablet Take 60 mg by mouth three (3) times daily.  12/20/21   Provider, Historical   gabapentin (NEURONTIN) 300 mg capsule Take 300 mg by mouth three (3) times daily. 2/1/22   Provider, Historical   primidone (MYSOLINE) 250 mg tablet Take 250 mg by mouth three (3) times daily. 1/31/22   Provider, Historical   risperiDONE (RisperDAL) 4 mg tablet Take 4 mg by mouth nightly. 12/31/21   Provider, Historical   venlafaxine-SR (EFFEXOR-XR) 75 mg capsule Take 75 mg by mouth three (3) times daily. 3/6/22   Provider, Historical   traZODone (DESYREL) 150 mg tablet Take 150 mg by mouth nightly. 2/22/22   Provider, Historical   aspirin delayed-release 81 mg tablet Take  by mouth daily. Provider, Historical   b-complex with vitamin c tablet Take 1 tablet by mouth daily. Provider, Historical   baclofen (LIORESAL) 10 mg tablet Take 10 mg by mouth two (2) times a day. Provider, Historical   clonazePAM (KLONOPIN) 0.5 mg tablet Take 1 Tab by mouth three (3) times daily. 6/3/13   Joni Padron, CARMEN   cholecalciferol, vitamin D3, (VITAMIN D3) 2,000 unit Tab Take 1 Tab by mouth daily. Provider, Historical   cyanocobalamin (VITAMIN B-12) 1,000 mcg tablet Take 1,000 mcg by mouth daily. Provider, Historical   levothyroxine (LEVOTHROID) 100 mcg tablet Take 100 mcg by mouth Daily (before breakfast). Provider, Historical   sitaGLIPtin (JANUVIA) 100 mg tablet Take 100 mg by mouth daily. Only takes if glucose is above 150 every morning  Indications: TYPE 2 DIABETES MELLITUS    Provider, Historical   melatonin 3 mg tablet Take 3 mg by mouth nightly. Provider, Historical   LIPASE/PROTEASE/AMYLASE (PANCRELIPASE 5000 PO) Take 4 Tabs by mouth three (3) times daily (with meals).     Provider, Historical     Allergies   Allergen Reactions    Bactrim [Sulfamethoprim Ds] Hives    Other Medication Anaphylaxis and Swelling     Throat closed Unknown pain medication by injection 2 days postop(placed on ventilator for 6 days)    Sulfa (Sulfonamide Antibiotics) Hives and Itching    Ace Inhibitors Other (comments)     Causes liver enzymes to elevate             Review of Systems:  Constitutional: Appetite is fair, denies weight loss. Noted to have fever. Complains of severe fatigue and weakness. Eye: No recent visual disturbances, no discharge, no double vision. Ear/nose/mouth/throat : No hearing disturbance, no ear pain, no nasal congestion, no sore throat, no trouble swallowing. Respiratory : No trouble breathing, no cough, ++ shortness of breath, no hemoptysis, no wheezing. Cardiovascular : No chest pain, no palpitation, no orthopnea,  no peripheral edema. Gastrointestinal : No nausea, no vomiting, ++ diarrhea, No abdominal pain. Genitourinary : +++ dysuria, +++ increased frequency, ++ incontinence. Lymphatics : She is unable to answer. Endocrine : No excessive thirst,  No cold intolerance, No heat intolerance. Immunologic :  No seasonal allergies. Musculoskeletal : Complains of generalized aching pains, denies any back pain. No joint swelling. Integumentary : Denies any itching, states she just scratches the legs. Hematology :   No tendency to bleed easy. Neurology : Denies change in mental status, No headache,  No numbness or tingling. Psychiatric : She is unable to answer    Vitals:     Patient Vitals for the past 12 hrs:   Temp Pulse Resp BP SpO2   03/14/22 1536 -- 85 24 (!) 110/59 96 %   03/14/22 1418 (!) 102.2 °F (39 °C) 94 18 (!) 108/56 90 %       Physical Exam:   General : Looks very tired and weak, very lethargic. HEENT : PERRLA, dry oral mucosa, atraumatic normocephalic, Normal ear and nose. Neck : Supple, no JVD, no masses noted, no carotid bruit. Lungs : Breath sounds with moderate air entry bilaterally, no wheezes or rales, no accessory muscle use. CVS : Rhythm rate regular, S1+, S2+, no murmur or gallop. Monitor with sinus rhythm. Abdomen : Soft, nontender, no organomegaly, bowel sounds active. Extremities : No edema noted,  pedal pulses not palpable.   Musculoskeletal :  no joint swelling or effusion, muscle tone and power appears normal. Skin : Dry, poor skin turgor. no pathological rash. Lymphatic : No cervical lymphadenopathy. Neurological : Awake, alert, oriented to place person. Psychiatric : She is very slow due to her sickness. Data Review:   Recent Results (from the past 24 hour(s))   CBC WITH AUTOMATED DIFF    Collection Time: 03/14/22  2:14 PM   Result Value Ref Range    WBC 24.8 (H) 3.6 - 11.0 K/uL    RBC 4.16 3.80 - 5.20 M/uL    HGB 12.4 11.5 - 16.0 g/dL    HCT 38.6 35.0 - 47.0 %    MCV 92.8 80.0 - 99.0 FL    MCH 29.8 26.0 - 34.0 PG    MCHC 32.1 30.0 - 36.5 g/dL    RDW 13.4 11.5 - 14.5 %    PLATELET 867 562 - 972 K/uL    MPV 10.6 8.9 - 12.9 FL    NRBC 0.0 0.0  WBC    ABSOLUTE NRBC 0.00 0.00 - 0.01 K/uL    NEUTROPHILS 86 (H) 32 - 75 %    LYMPHOCYTES 5 (L) 12 - 49 %    MONOCYTES 8 5 - 13 %    EOSINOPHILS 0 0 - 7 %    BASOPHILS 0 0 - 1 %    IMMATURE GRANULOCYTES 1 (H) 0 - 0.5 %    ABS. NEUTROPHILS 21.2 (H) 1.8 - 8.0 K/UL    ABS. LYMPHOCYTES 1.3 0.8 - 3.5 K/UL    ABS. MONOCYTES 2.0 (H) 0.0 - 1.0 K/UL    ABS. EOSINOPHILS 0.0 0.0 - 0.4 K/UL    ABS. BASOPHILS 0.1 0.0 - 0.1 K/UL    ABS. IMM.  GRANS. 0.3 (H) 0.00 - 0.04 K/UL    DF AUTOMATED     METABOLIC PANEL, BASIC    Collection Time: 03/14/22  2:14 PM   Result Value Ref Range    Sodium 132 (L) 136 - 145 mmol/L    Potassium 3.8 3.5 - 5.1 mmol/L    Chloride 102 97 - 108 mmol/L    CO2 23 21 - 32 mmol/L    Anion gap 7 5 - 15 mmol/L    Glucose 184 (H) 65 - 100 mg/dL    BUN 15 6 - 20 mg/dL    Creatinine 1.01 0.55 - 1.02 mg/dL    BUN/Creatinine ratio 15 12 - 20      GFR est AA >60 >60 ml/min/1.73m2    GFR est non-AA 55 (L) >60 ml/min/1.73m2    Calcium 8.6 8.5 - 10.1 mg/dL   TROPONIN-HIGH SENSITIVITY    Collection Time: 03/14/22  2:14 PM   Result Value Ref Range    Troponin-High Sensitivity 5 0 - 51 ng/L   LACTIC ACID    Collection Time: 03/14/22  2:14 PM   Result Value Ref Range    Lactic acid 1.7 0.4 - 2.0 mmol/L   COVID-19 RAPID TEST    Collection Time: 03/14/22  2:14 PM   Result Value Ref Range    COVID-19 rapid test Not Detected Not Detected     INFLUENZA A & B AG (RAPID TEST)    Collection Time: 03/14/22  2:14 PM   Result Value Ref Range    Influenza A Antigen Negative Negative      Influenza B Antigen Negative Negative         Radiologic Studies :   CT Results  (Last 48 hours)               03/14/22 1427  CT HEAD WO CONT Final result    Impression:  Evidence for advanced nonacute end vessel occlusive change. No intracranial hemorrhage. Narrative:  CT head. Axial images are reviewed along with reformatted sagittal/coronal images. Dose reduction: All CT scans at this facility are performed using dose reduction   optimization techniques as appropriate to a performed exam including the   following-   automated exposure control, adjustments of mA and/or Kv according to patient   size, or use of iterative reconstructive technique. .       Bone windows demonstrate normal aeration image sinuses and mastoid air cells. Prior ophthalmologic surgery. Review of intracranial content reveals asymmetric greater left   periventricular/subcortical patchy low density, evidence for nonacute end vessel   occlusive change. Right thalamic lacune. No hydrocephalus. No intracranial   hemorrhage. CXR Results  (Last 48 hours)               03/14/22 1415  XR CHEST PORT Final result    Narrative:  Chest single view. Overlying soft tissue appears to accentuate reticular markings. No gross   interstitial or alveolar pulmonary edema. Cardiac and mediastinal structures   magnified on this AP view. Thoracic aorta atherosclerosis. No pneumothorax or   sizable pleural effusion. Assessment and Plan :     Sepsis with hypotension and shock: Secondary to most likely from urinary tract infection. Started on IV ceftriaxone in the emergency room which we will continue, requested cultures. Now requiring pressors, continue IV fluids.     Diabetes mellitus type 2: On small dose of Metformin and sitagliptin 100 mg daily. We will continue and keep her on Accu-Cheks with a sliding scale insulin    Severe dehydration: Likely secondary to poor oral intake, given IV fluids in which we will continue maintenance fluids    Significant neuropathy secondary to history of back problems with surgeries, on multiple medications which we will continue as condition dictates    Hypothyroidism: On supplementation, will check TSH. Benign essential hypertension: On propranolol and Cozaar, we will hold until blood pressure improves and restart when condition dictates. Admitted to ICU, full CODE STATUS, home medications reviewed with external Rx history. Has no advance medical directives, has  and 2 children who will make decisions in case if she cannot. Started on Lovenox for DVT prophylaxis. CC : Janneth Flores MD  Signed By: Kaye Deleon MD     March 14, 2022      This dictation was done by dragon, computer voice recognition software. Often unanticipated grammatical, syntax, [place] phones and other interpretive errors are inadvertently transcribed. Please excuse errors that have escaped final proofreading.

## 2022-03-15 NOTE — PROGRESS NOTES
Problem: Dysphagia (Adult)  Goal: *Acute Goals and Plan of Care (Insert Text)  Description: Speech Therapy Goals  Initiated 3/15/2022  -Patient will tolerate minced and moist diet with mildly/nectar thick liquids without signs/symptoms of aspiration given minimal cues within 7 day(s). [ ] Not met  [ ]  MET   [ ] Progressing  [ ] Mertens Bunting  -Patient will demonstrate understanding of swallow safety precautions and aspiration precautions, diet recs with minimal cues within 7 day(s). [ ] Not met  [ ]  MET   [ ] Progressing  [ ] Discontinue  Outcome: Not Met  SPEECH [address] EVALUATION  Patient: Heather Marks (83 y.o. female)  Date: 3/15/2022  Primary Diagnosis: Sepsis ([address] [A41.9]  UTI (urinary tract infection) [N39.0]        Precautions: aspiration       ASSESSMENT :  Based on the objective data described below, the patient presents with mild-mod oropharyngeal dysphagia negatively impacted by AMS. Pt seen in ICU, AMS is noted. Pt positioned upright in bed. Nsg is present. Pt currently on 4L NC02 with 02 sats 98%  Pt oral motor function WFL. Minimal poor dentition, no dentures. Vocal quality WFL. Nsg reports prolonged mastication and delayed oral clearance. Pt reports has been eating \"mashed potatoes at home because those are easy to swallow. \"  Pt given trials of thin via cup/straw, mildly thick via straw, puree and soft solids. Oral phase with reduced bolus control and formation with thin trials. Reduced oropharyngeal coordination noted. Prolonged mastication with difficulty clearing min trial of solid. Pt able to clear thin and puree without difficulty. Pharyngeal phase with mild delay, appears WFL once initiated. Reduced respiratory/deglutition coordination with increased WOB noted with rapid sequential trials thin via straw. No overt s/s aspiration; however, concerns for aspiration are present. CT head results noted.      Patient will benefit from skilled intervention to address the above impairments. Patients rehabilitation potential is considered to be Fair     PLAN :  Recommendations and Planned Interventions: At this time, recommend diet downgrade to MM5/mildly thick with 1:1 assistance with ALL PO intake, STRICT aspiration and GERD precautions, monitor pt closely for s/s aspiration, meds crushed if able in puree, FEED ONLY IF AWAKE AND ALERT. Recommend SLP tx for dysphagia, trials of diet upgrade, swallow safety. Will request MBS if/as indicated pending pt's progress. Frequency/Duration: Patient will be followed by speech-language pathology 4 times a week to address goals. Discharge Recommendations: cont SLP intervention     SUBJECTIVE:   Patient alert, agreeable, confused. OBJECTIVE:     CXR Results  (Last 48 hours)                 03/14/22 1415  XR CHEST PORT Final result    Narrative:  Chest single view. Overlying soft tissue appears to accentuate reticular markings. No gross   interstitial or alveolar pulmonary edema. Cardiac and mediastinal structures   magnified on this AP view. Thoracic aorta atherosclerosis. No pneumothorax or   sizable pleural effusion. CT Results  (Last 48 hours)                 03/14/22 1427  CT HEAD WO CONT Final result    Impression:  Evidence for advanced nonacute end vessel occlusive change. No intracranial hemorrhage. Narrative:  CT head. Axial images are reviewed along with reformatted sagittal/coronal images. Dose reduction: All CT scans at this facility are performed using dose reduction   optimization techniques as appropriate to a performed exam including the   following-   automated exposure control, adjustments of mA and/or Kv according to patient   size, or use of iterative reconstructive technique. .       Bone windows demonstrate normal aeration image sinuses and mastoid air cells. Prior ophthalmologic surgery.        Review of intracranial content reveals asymmetric greater left   periventricular/subcortical patchy low density, evidence for nonacute end vessel   occlusive change. Right thalamic lacune. No hydrocephalus. No intracranial   hemorrhage. Past Medical History:   Diagnosis Date    Arthritis     Chronic pain     back pain    Chronic pain     fibromyalgia    Diabetes ([address]     steroid induced per pt    Fibromyalgia     chronic pain    GERD (gastroesophageal reflux disease)     Hypercholesteremia     Hypertension     Hypothyroid     hypothyroid    Migraine     Morbid obesity ([address]     Other ill-defined conditions(799.89)     extreme dry mouth (due to meds pt stated)    Pernicious anemia     Psychiatric disorder     anxiety and depression    Unspecified sleep apnea     cpap    Urinary incontinence      Past Surgical History:   Procedure Laterality Date    HX APPENDECTOMY      HX BREAST LUMPECTOMY  2011    left    HX CHOLECYSTECTOMY      HX ORTHOPAEDIC      x4 spine surgeries    HX ORTHOPAEDIC  5/28/13    L2-5 DECOMPRESSION AND FUSION POSTERIOR MULTIILEVEL    HX OTHER SURGICAL      laparoscopy - not sure why    HX TOTAL ABDOMINAL HYSTERECTOMY  1981    menorrhagia, dysmenorrhea;  thinks she may have had one ovary removed.     HX UROLOGICAL      surgery for incontinence    HX UROLOGICAL  10/10/14    Urodynamics     Prior Level of Function/Home Situation:   Home Situation  Home Environment: Private residence  One/Two Story Residence: One story  Living Alone: No  Support Systems: Spouse/Significant Other  Patient Expects to be Discharged to[de-identified]  (TBD)  Current DME Used/Available at Home: Blood pressure cuff,Glucometer,Walker, rollator  Diet prior to admission: soft/regular/thin  Current Diet:  Regular/thin   Cognitive and Communication Status:  Neurologic State: Alert,Confused  Orientation Level: Oriented to person  Cognition: Decreased attention/concentration    Pain:  Pain Scale 1: Numeric (0 - 10)  Pain Intensity 1: 0       After treatment:   Patient left in no apparent distress in bed, Call bell within reach and Nursing notified    COMMUNICATION/EDUCATION:   Patient was educated regarding purpose of SLP assessment, POC, diet recs and sw safety precautions. Patient demonstrated Williams Gelineau understanding as evidenced by AMS. The patient's plan of care including recommendations, planned interventions, and recommended diet changes were discussed with: Registered nurse. Patient/family have participated as able in goal setting and plan of care. Patient/family agree to work toward stated goals and plan of care.     Thank you for this referral.  Chelsie Camp M.S. CCC-SLP  Time Calculation: 15 mins

## 2022-03-16 ENCOUNTER — APPOINTMENT (OUTPATIENT)
Dept: GENERAL RADIOLOGY | Age: 67
DRG: 871 | End: 2022-03-16
Attending: INTERNAL MEDICINE
Payer: MEDICARE

## 2022-03-16 LAB
ANION GAP SERPL CALC-SCNC: 6 MMOL/L (ref 5–15)
BASOPHILS # BLD: 0 K/UL (ref 0–0.1)
BASOPHILS NFR BLD: 0 % (ref 0–1)
BUN SERPL-MCNC: 16 MG/DL (ref 6–20)
BUN/CREAT SERPL: 22 (ref 12–20)
CA-I BLD-MCNC: 8.3 MG/DL (ref 8.5–10.1)
CHLORIDE SERPL-SCNC: 116 MMOL/L (ref 97–108)
CO2 SERPL-SCNC: 21 MMOL/L (ref 21–32)
CREAT SERPL-MCNC: 0.73 MG/DL (ref 0.55–1.02)
DIFFERENTIAL METHOD BLD: ABNORMAL
EOSINOPHIL # BLD: 0.1 K/UL (ref 0–0.4)
EOSINOPHIL NFR BLD: 1 % (ref 0–7)
ERYTHROCYTE [DISTWIDTH] IN BLOOD BY AUTOMATED COUNT: 14.3 % (ref 11.5–14.5)
GLUCOSE BLD STRIP.AUTO-MCNC: 100 MG/DL (ref 65–117)
GLUCOSE BLD STRIP.AUTO-MCNC: 120 MG/DL (ref 65–117)
GLUCOSE BLD STRIP.AUTO-MCNC: 143 MG/DL (ref 65–117)
GLUCOSE BLD STRIP.AUTO-MCNC: 144 MG/DL (ref 65–117)
GLUCOSE SERPL-MCNC: 130 MG/DL (ref 65–100)
HCT VFR BLD AUTO: 31.5 % (ref 35–47)
HGB BLD-MCNC: 10.2 G/DL (ref 11.5–16)
IMM GRANULOCYTES # BLD AUTO: 0.1 K/UL (ref 0–0.04)
IMM GRANULOCYTES NFR BLD AUTO: 1 % (ref 0–0.5)
LYMPHOCYTES # BLD: 1.2 K/UL (ref 0.8–3.5)
LYMPHOCYTES NFR BLD: 8 % (ref 12–49)
MAGNESIUM SERPL-MCNC: 2.1 MG/DL (ref 1.6–2.4)
MCH RBC QN AUTO: 30.3 PG (ref 26–34)
MCHC RBC AUTO-ENTMCNC: 32.4 G/DL (ref 30–36.5)
MCV RBC AUTO: 93.5 FL (ref 80–99)
MONOCYTES # BLD: 1 K/UL (ref 0–1)
MONOCYTES NFR BLD: 7 % (ref 5–13)
NEUTS SEG # BLD: 12.5 K/UL (ref 1.8–8)
NEUTS SEG NFR BLD: 83 % (ref 32–75)
NRBC # BLD: 0 K/UL (ref 0–0.01)
NRBC BLD-RTO: 0 PER 100 WBC
PERFORMED BY, TECHID: ABNORMAL
PERFORMED BY, TECHID: NORMAL
PLATELET # BLD AUTO: 251 K/UL (ref 150–400)
PMV BLD AUTO: 11.2 FL (ref 8.9–12.9)
POTASSIUM SERPL-SCNC: 3.5 MMOL/L (ref 3.5–5.1)
RBC # BLD AUTO: 3.37 M/UL (ref 3.8–5.2)
SODIUM SERPL-SCNC: 143 MMOL/L (ref 136–145)
WBC # BLD AUTO: 14.9 K/UL (ref 3.6–11)

## 2022-03-16 PROCEDURE — 77010033678 HC OXYGEN DAILY

## 2022-03-16 PROCEDURE — 65610000006 HC RM INTENSIVE CARE

## 2022-03-16 PROCEDURE — 74011250637 HC RX REV CODE- 250/637: Performed by: INTERNAL MEDICINE

## 2022-03-16 PROCEDURE — 74011000250 HC RX REV CODE- 250: Performed by: HOSPITALIST

## 2022-03-16 PROCEDURE — 36415 COLL VENOUS BLD VENIPUNCTURE: CPT

## 2022-03-16 PROCEDURE — 83735 ASSAY OF MAGNESIUM: CPT

## 2022-03-16 PROCEDURE — 85025 COMPLETE CBC W/AUTO DIFF WBC: CPT

## 2022-03-16 PROCEDURE — 80048 BASIC METABOLIC PNL TOTAL CA: CPT

## 2022-03-16 PROCEDURE — 74011250636 HC RX REV CODE- 250/636: Performed by: INTERNAL MEDICINE

## 2022-03-16 PROCEDURE — 71045 X-RAY EXAM CHEST 1 VIEW: CPT

## 2022-03-16 PROCEDURE — 74011250637 HC RX REV CODE- 250/637: Performed by: HOSPITALIST

## 2022-03-16 PROCEDURE — 82962 GLUCOSE BLOOD TEST: CPT

## 2022-03-16 PROCEDURE — 74011250636 HC RX REV CODE- 250/636: Performed by: HOSPITALIST

## 2022-03-16 RX ORDER — SODIUM CHLORIDE 9 MG/ML
100 INJECTION, SOLUTION INTRAVENOUS CONTINUOUS
Status: DISCONTINUED | OUTPATIENT
Start: 2022-03-16 | End: 2022-03-16

## 2022-03-16 RX ORDER — SODIUM CHLORIDE AND POTASSIUM CHLORIDE .9; .15 G/100ML; G/100ML
SOLUTION INTRAVENOUS CONTINUOUS
Status: DISPENSED | OUTPATIENT
Start: 2022-03-16 | End: 2022-03-17

## 2022-03-16 RX ORDER — PROPRANOLOL HYDROCHLORIDE 10 MG/1
20 TABLET ORAL 3 TIMES DAILY
Status: DISCONTINUED | OUTPATIENT
Start: 2022-03-16 | End: 2022-03-17

## 2022-03-16 RX ADMIN — MELATONIN TAB 3 MG 3 MG: 3 TAB at 21:43

## 2022-03-16 RX ADMIN — ENOXAPARIN SODIUM 40 MG: 100 INJECTION SUBCUTANEOUS at 12:46

## 2022-03-16 RX ADMIN — SODIUM CHLORIDE, PRESERVATIVE FREE 10 ML: 5 INJECTION INTRAVENOUS at 06:52

## 2022-03-16 RX ADMIN — PROPRANOLOL HYDROCHLORIDE 20 MG: 10 TABLET ORAL at 16:31

## 2022-03-16 RX ADMIN — Medication 1 TABLET: at 09:39

## 2022-03-16 RX ADMIN — PANCRELIPASE 1 CAPSULE: 60000; 12000; 38000 CAPSULE, DELAYED RELEASE PELLETS ORAL at 12:49

## 2022-03-16 RX ADMIN — CLONAZEPAM 0.5 MG: 1 TABLET ORAL at 21:43

## 2022-03-16 RX ADMIN — MAGNESIUM OXIDE TAB 400 MG (241.3 MG ELEMENTAL MG) 400 MG: 400 (241.3 MG) TAB at 09:39

## 2022-03-16 RX ADMIN — SODIUM CHLORIDE, PRESERVATIVE FREE 10 ML: 5 INJECTION INTRAVENOUS at 21:44

## 2022-03-16 RX ADMIN — CLONAZEPAM 0.5 MG: 1 TABLET ORAL at 09:39

## 2022-03-16 RX ADMIN — SODIUM CHLORIDE, PRESERVATIVE FREE 10 ML: 5 INJECTION INTRAVENOUS at 13:11

## 2022-03-16 RX ADMIN — ACETAMINOPHEN 650 MG: 325 TABLET ORAL at 06:50

## 2022-03-16 RX ADMIN — LURASIDONE HYDROCHLORIDE 60 MG: 40 TABLET, FILM COATED ORAL at 21:51

## 2022-03-16 RX ADMIN — CLONAZEPAM 0.5 MG: 1 TABLET ORAL at 16:29

## 2022-03-16 RX ADMIN — BACLOFEN 10 MG: 10 TABLET ORAL at 09:39

## 2022-03-16 RX ADMIN — Medication 2000 UNITS: at 09:39

## 2022-03-16 RX ADMIN — PRIMIDONE 250 MG: 250 TABLET ORAL at 09:39

## 2022-03-16 RX ADMIN — POTASSIUM CHLORIDE AND SODIUM CHLORIDE: 900; 150 INJECTION, SOLUTION INTRAVENOUS at 16:16

## 2022-03-16 RX ADMIN — Medication 1000 MCG: at 09:40

## 2022-03-16 RX ADMIN — ALOGLIPTIN 25 MG: 25 TABLET, FILM COATED ORAL at 09:39

## 2022-03-16 RX ADMIN — LEVOTHYROXINE SODIUM 100 MCG: 0.1 TABLET ORAL at 08:16

## 2022-03-16 RX ADMIN — PANCRELIPASE 1 CAPSULE: 60000; 12000; 38000 CAPSULE, DELAYED RELEASE PELLETS ORAL at 16:18

## 2022-03-16 RX ADMIN — GABAPENTIN 300 MG: 300 CAPSULE ORAL at 16:29

## 2022-03-16 RX ADMIN — VENLAFAXINE HYDROCHLORIDE 75 MG: 75 CAPSULE, EXTENDED RELEASE ORAL at 21:43

## 2022-03-16 RX ADMIN — WATER 1 G: 1 INJECTION INTRAMUSCULAR; INTRAVENOUS; SUBCUTANEOUS at 09:38

## 2022-03-16 RX ADMIN — VENLAFAXINE HYDROCHLORIDE 75 MG: 75 CAPSULE, EXTENDED RELEASE ORAL at 09:39

## 2022-03-16 RX ADMIN — MAGNESIUM OXIDE TAB 400 MG (241.3 MG ELEMENTAL MG) 400 MG: 400 (241.3 MG) TAB at 21:43

## 2022-03-16 RX ADMIN — RISPERIDONE 4 MG: 2 TABLET, FILM COATED ORAL at 21:43

## 2022-03-16 RX ADMIN — SODIUM CHLORIDE 125 ML/HR: 9 INJECTION, SOLUTION INTRAVENOUS at 00:35

## 2022-03-16 RX ADMIN — BACLOFEN 10 MG: 10 TABLET ORAL at 21:42

## 2022-03-16 RX ADMIN — PRIMIDONE 250 MG: 250 TABLET ORAL at 16:29

## 2022-03-16 RX ADMIN — ASPIRIN 81 MG: 81 TABLET, COATED ORAL at 09:39

## 2022-03-16 RX ADMIN — PRIMIDONE 250 MG: 250 TABLET ORAL at 21:43

## 2022-03-16 RX ADMIN — PANCRELIPASE 1 CAPSULE: 60000; 12000; 38000 CAPSULE, DELAYED RELEASE PELLETS ORAL at 09:39

## 2022-03-16 RX ADMIN — WATER 1 G: 1 INJECTION INTRAMUSCULAR; INTRAVENOUS; SUBCUTANEOUS at 21:43

## 2022-03-16 RX ADMIN — PROPRANOLOL HYDROCHLORIDE 20 MG: 10 TABLET ORAL at 21:42

## 2022-03-16 RX ADMIN — GABAPENTIN 300 MG: 300 CAPSULE ORAL at 09:40

## 2022-03-16 RX ADMIN — SODIUM CHLORIDE 125 ML/HR: 9 INJECTION, SOLUTION INTRAVENOUS at 09:41

## 2022-03-16 NOTE — PROGRESS NOTES
Patient did void. So barnes not placed. Per  her right side tends to be her weaker side at baseline. Pt more alert and less weakness this afternoon after napping with cpap.

## 2022-03-16 NOTE — PROGRESS NOTES
During am assessment pt noted to have slurred speech and noted right arm and leg appears more weak than right though pt generally weak all over and int confusion making difficult to assess. Facial movements symetrical.  Pt confused but able to reorient easily and state place and year. Night shift still on unit confirmed this slurred speech is not new and is worse and better at times depended on how alert pt is. Per night shift nurse pt was minimally responsive when she first came to icu and has been generally very weak  Pt appears more alert and actually moving more now. But right extremities noticeably weaker. Notified Dr. Lawyer Lujan as unsure if this is new as pt has been altered since admission.   Hand grasps equal but unable to lift right elbow off bed

## 2022-03-16 NOTE — PROGRESS NOTES
Spoke w/ nsg. Patient not appropriate for swallow tx at this time s/t alertness. SLP to continue to follow.

## 2022-03-16 NOTE — PROGRESS NOTES
Hospitalist Progress Note               Daily Progress Note: 3/16/2022  Chief Complaint : Altered mental status  Per HPI: 78 y.o. female with a history of diabetes, hypothyroidism, hypertension presents to the emergency room complaining of altered mental status as per . But by the time she came to the ED she was very weak and slow and lethargic but able to wake up and give information. She has difficult time to talk due to the tiredness and dryness. Mouth is getting sticky due to dryness. States started not feeling good 2 weeks ago, treated with antibiotic but when she finished that she started having again symptoms of urinary tract infection. States its frequency and dysuria. Cannot tell if there is any hematuria. And started feeling very lethargy and tiredness. Evaluation in the emergency room found with significant leukocytosis with left shift, hypotension, temperature 102.2. Suspected urinary tract infection, urine that was collected looks very cloudy and dark. She is given fluid bolus with mild improvement in the blood pressure. Admitted to medical telemetry but noted to have hypotension which did not respond to the fluid bolus, transfer to ICU. In ICU she was very dusky with low blood pressure with complete altered mental status and unresponsive. Continued on IV fluids and started on Levophed now she is little better with open eyes and blood pressure improved.     At baseline she is needing help even for bathing due to history of multiple back surgeries. Ambulation is very limited. Subjective: The patient is seen for follow  up.    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  ICD-9-CM: 618.00  10/10/2014 - Present        Rectocele ICD-10-CM: N81.6  ICD-9-CM: 618.04  10/10/2014 - Present        [facility] mellitus[facility] ICD-10-CM: E11.9  ICD-9-CM: 250.00  5/29/2013 - Present        Unspecified hypothyroidism ICD-10-CM: E03.9  ICD-9-CM: 244.9  5/29/2013 - Present        HTN (hypertension) ICD-10-CM: I10  ICD-9-CM: 401.9  5/29/2013 - Present        Fibromyalgia ICD-10-CM: M79.7  ICD-9-CM: 729.1  5/29/2013 - Present              Medications reviewed  Current Facility-Administered Medications   Medication Dose Route Frequency    NOREPINephrine (LEVOPHED) 8 mg in 0.9% NS 250ml infusion  0.5-120 mcg/min IntraVENous TITRATE    magnesium oxide (MAG-OX) tablet 400 mg  400 mg Oral BID    lurasidone (LATUDA) tablet 60 mg  60 mg Oral QHS    venlafaxine-SR (EFFEXOR-XR) capsule 75 mg  75 mg Oral BID    cholecalciferol (VITAMIN D3) (1000 Units /25 mcg) tablet 2,000 Units  2,000 Units Oral DAILY    cyanocobalamin tablet 1,000 mcg  1,000 mcg Oral DAILY    levothyroxine (SYNTHROID) tablet 100 mcg  100 mcg Oral ACB    alogliptin (NESINA) tablet 25 mg  25 mg Oral DAILY    melatonin tablet 3 mg  3 mg Oral QHS    lipase-protease-amylase (CREON 12,000) capsule 1 Capsule  1 Capsule Oral TID WITH MEALS    clonazePAM (KlonoPIN) tablet 0.5 mg  0.5 mg Oral TID    aspirin delayed-release tablet 81 mg  81 mg Oral DAILY    b-complex with vitamin c tablet 1 Tablet  1 Tablet Oral DAILY    baclofen (LIORESAL) tablet 10 mg  10 mg Oral BID    [Held by provider] metFORMIN ER (GLUCOPHAGE XR) tablet 500 mg  500 mg Oral DAILY    [Held by provider] losartan (COZAAR) tablet 25 mg  25 mg Oral DAILY    [Held by provider] propranoloL (INDERAL) tablet 60 mg  60 mg Oral TID    gabapentin (NEURONTIN) capsule 300 mg  300 mg Oral TID    primidone (MYSOLINE) tablet 250 mg  250 mg Oral TID    risperiDONE (RisperDAL) tablet 4 mg  4 mg Oral QHS    sodium chloride (NS) flush 5-40 mL  5-40 mL IntraVENous Q8H    sodium chloride (NS) flush 5-40 mL  5-40 mL IntraVENous PRN    acetaminophen (TYLENOL) tablet 650 mg  650 mg Oral Q6H PRN    Or    acetaminophen (TYLENOL) suppository 650 mg  650 mg Rectal Q6H PRN    ondansetron (ZOFRAN ODT) tablet 4 mg  4 mg Oral Q6H PRN    Or    ondansetron (ZOFRAN) injection 4 mg  4 mg IntraVENous Q6H PRN    enoxaparin (LOVENOX) injection 40 mg  40 mg SubCUTAneous DAILY    0.9% sodium chloride infusion  125 mL/hr IntraVENous CONTINUOUS    insulin lispro (HUMALOG) injection   SubCUTAneous AC&HS    glucose chewable tablet 16 g  4 Tablet Oral PRN    dextrose 10% infusion 0-250 mL  0-250 mL IntraVENous PRN    glucagon (GLUCAGEN) injection 1 mg  1 mg IntraMUSCular PRN    cefTRIAXone (ROCEPHIN) 1 g in sterile water (preservative free) 10 mL IV syringe  1 g IntraVENous Q12H       Review of Systems:   Review of systems not obtained due to patient factors. Objective:   Physical Exam:     Visit Vitals  /73 (BP 1 Location: Right upper arm, BP Patient Position: At rest)   Pulse 84   Temp 97.6 °F (36.4 °C)   Resp 19   Ht 5' 3\" (1.6 m)   Wt 83.7 kg (184 lb 8.4 oz)   SpO2 99%   Breastfeeding No   BMI 32.69 kg/m²    O2 Flow Rate (L/min): 2 l/min O2 Device: Nasal cannula    Temp (24hrs), Av.1 °F (37.8 °C), Min:97.6 °F (36.4 °C), Max:101.7 °F (38.7 °C)    701 - 1900  In: 100 [P.O.:100]  Out: -    1901 -  0700  In: 360 [P.O.:360]  Out: 6097 [Urine:3325]    General:  Alert/somnolent/cpap mask on, cooperative, no distress, appears stated age. Head:  Normocephalic, without obvious abnormality, atraumatic. Eyes:  Conjunctivae/corneas clear. EOMs intact. Throat: Moist oral mucosa   Neck: Supple, symmetrical, trachea midline, no adenopathy, no JVD. Lungs:   Clear to auscultation bilaterally.    Chest wall:  No tenderness or deformity. Heart:  Regular rate and rhythm, S1, S2 normal, no murmur, click, rub or gallop. Abdomen:   Soft, non-tender, not distended. Bowel sounds present, No rebound or guarding. Extremities: Extremities normal, atraumatic, no cyanosis. No edema   Skin: Warm,dry   Neurologic: CNII-XII intact. No motor or sensory deficits. Data Review:       Recent Days:  Recent Labs     03/16/22  0627 03/15/22  0523 03/14/22  1414   WBC 14.9* 31.7* 24.8*   HGB 10.2* 10.6* 12.4   HCT 31.5* 33.5* 38.6    255 276     Recent Labs     03/16/22  0627 03/15/22  0523 03/14/22  1414    136 132*   K 3.5 3.5 3.8   * 108 102   CO2 21 19* 23   * 185* 184*   BUN 16 19 15   CREA 0.73 1.24* 1.01   CA 8.3* 7.5* 8.6   MG 2.1 1.2*  --    PHOS  --  3.2  --    ALB  --  2.6*  --      No results for input(s): PH, PCO2, PO2, HCO3, FIO2 in the last 72 hours. 24 Hour Results:  Recent Results (from the past 24 hour(s))   GLUCOSE, POC    Collection Time: 03/15/22  3:44 PM   Result Value Ref Range    Glucose (POC) 122 (H) 65 - 117 mg/dL    Performed by Meme Pappas    GLUCOSE, POC    Collection Time: 03/15/22  9:52 PM   Result Value Ref Range    Glucose (POC) 110 65 - 117 mg/dL    Performed by Libra Khan    CBC WITH AUTOMATED DIFF    Collection Time: 03/16/22  6:27 AM   Result Value Ref Range    WBC 14.9 (H) 3.6 - 11.0 K/uL    RBC 3.37 (L) 3.80 - 5.20 M/uL    HGB 10.2 (L) 11.5 - 16.0 g/dL    HCT 31.5 (L) 35.0 - 47.0 %    MCV 93.5 80.0 - 99.0 FL    MCH 30.3 26.0 - 34.0 PG    MCHC 32.4 30.0 - 36.5 g/dL    RDW 14.3 11.5 - 14.5 %    PLATELET 739 463 - 297 K/uL    MPV 11.2 8.9 - 12.9 FL    NRBC 0.0 0.0  WBC    ABSOLUTE NRBC 0.00 0.00 - 0.01 K/uL    NEUTROPHILS 83 (H) 32 - 75 %    LYMPHOCYTES 8 (L) 12 - 49 %    MONOCYTES 7 5 - 13 %    EOSINOPHILS 1 0 - 7 %    BASOPHILS 0 0 - 1 %    IMMATURE GRANULOCYTES 1 (H) 0 - 0.5 %    ABS. NEUTROPHILS 12.5 (H) 1.8 - 8.0 K/UL    ABS. LYMPHOCYTES 1.2 0.8 - 3.5 K/UL    ABS. MONOCYTES 1.0 0.0 - 1.0 K/UL    ABS. EOSINOPHILS 0.1 0.0 - 0.4 K/UL    ABS. BASOPHILS 0.0 0.0 - 0.1 K/UL    ABS. IMM. GRANS. 0.1 (H) 0.00 - 0.04 K/UL    DF AUTOMATED     METABOLIC PANEL, BASIC    Collection Time: 03/16/22  6:27 AM   Result Value Ref Range    Sodium 143 136 - 145 mmol/L    Potassium 3.5 3.5 - 5.1 mmol/L    Chloride 116 (H) 97 - 108 mmol/L    CO2 21 21 - 32 mmol/L    Anion gap 6 5 - 15 mmol/L    Glucose 130 (H) 65 - 100 mg/dL    BUN 16 6 - 20 mg/dL    Creatinine 0.73 0.55 - 1.02 mg/dL    BUN/Creatinine ratio 22 (H) 12 - 20      GFR est AA >60 >60 ml/min/1.73m2    GFR est non-AA >60 >60 ml/min/1.73m2    Calcium 8.3 (L) 8.5 - 10.1 mg/dL   MAGNESIUM    Collection Time: 03/16/22  6:27 AM   Result Value Ref Range    Magnesium 2.1 1.6 - 2.4 mg/dL   GLUCOSE, POC    Collection Time: 03/16/22  7:45 AM   Result Value Ref Range    Glucose (POC) 120 (H) 65 - 117 mg/dL    Performed by Leonardo Alston    GLUCOSE, POC    Collection Time: 03/16/22 11:38 AM   Result Value Ref Range    Glucose (POC) 143 (H) 65 - 117 mg/dL    Performed by Leonardo Alston        chest X-ray    Assessment/     Patient Active Problem List   Diagnosis Code    DM (diabetes mellitus) ([facility] [address] E11.9    Unspecified hypothyroidism E03.9    HTN (hypertension) I10    Fibromyalgia M79.7    Urinary incontinence R32    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 Patient has had recurrent uti's over the past 2-3 years per hsband, ran out of prohylaxis > 1 month ago.        Diabetes mellitus type 2:   Pta On small dose of Metformin and sitagliptin 100 mg daily. Resumed with nesina. Metformin held. Continue ssi/hypoglycemia protocol  Bg's well controled/24 hrs    Severe dehydration: resolving  Likely secondary to poor oral intake,   Continue judicious hydration     Significant neuropathy secondary to history of back problems with surgeries, on multiple medications which we will continue as condition dictates     Hypothyroidism:   Continues synthroid,  tsh wnl     Benign essential hypertension: On propranolol and Cozaar, held in lieu of hypotension/shock. restart when condition dictates. Will resume proranolol and titrate    Hypomag:   Repleted and follow  Follow mag     Full code  Vtep: lovenox  Dispo: pending course, May transfer out of icu to [address] discussed with: Patient/Family and Nurse    Total time spent with patient: 30 minutes. This dictation was done by dragon, computer voice recognition software. Often unanticipated grammatical, syntax, phones and other interpretive errors are inadvertently transcribed. Please excuse errors that have escaped final proofreading.     Don Shanks MD

## 2022-03-17 LAB
ANION GAP SERPL CALC-SCNC: 6 MMOL/L (ref 5–15)
BACTERIA SPEC CULT: ABNORMAL
BASOPHILS # BLD: 0 K/UL (ref 0–0.1)
BASOPHILS NFR BLD: 0 % (ref 0–1)
BUN SERPL-MCNC: 13 MG/DL (ref 6–20)
BUN/CREAT SERPL: 24 (ref 12–20)
CA-I BLD-MCNC: 8.8 MG/DL (ref 8.5–10.1)
CHLORIDE SERPL-SCNC: 115 MMOL/L (ref 97–108)
CO2 SERPL-SCNC: 20 MMOL/L (ref 21–32)
COLONY COUNT,CNT: ABNORMAL
COLONY COUNT,CNT: ABNORMAL
CREAT SERPL-MCNC: 0.54 MG/DL (ref 0.55–1.02)
CRP SERPL-MCNC: 27.8 MG/DL (ref 0–0.6)
DIFFERENTIAL METHOD BLD: ABNORMAL
EOSINOPHIL # BLD: 0.2 K/UL (ref 0–0.4)
EOSINOPHIL NFR BLD: 2 % (ref 0–7)
ERYTHROCYTE [DISTWIDTH] IN BLOOD BY AUTOMATED COUNT: 14.6 % (ref 11.5–14.5)
GLUCOSE BLD STRIP.AUTO-MCNC: 114 MG/DL (ref 65–117)
GLUCOSE BLD STRIP.AUTO-MCNC: 117 MG/DL (ref 65–117)
GLUCOSE BLD STRIP.AUTO-MCNC: 118 MG/DL (ref 65–117)
GLUCOSE BLD STRIP.AUTO-MCNC: 157 MG/DL (ref 65–117)
GLUCOSE SERPL-MCNC: 140 MG/DL (ref 65–100)
HCT VFR BLD AUTO: 31.4 % (ref 35–47)
HGB BLD-MCNC: 10.1 G/DL (ref 11.5–16)
IMM GRANULOCYTES # BLD AUTO: 0.1 K/UL (ref 0–0.04)
IMM GRANULOCYTES NFR BLD AUTO: 1 % (ref 0–0.5)
LYMPHOCYTES # BLD: 1.4 K/UL (ref 0.8–3.5)
LYMPHOCYTES NFR BLD: 13 % (ref 12–49)
MCH RBC QN AUTO: 30.2 PG (ref 26–34)
MCHC RBC AUTO-ENTMCNC: 32.2 G/DL (ref 30–36.5)
MCV RBC AUTO: 94 FL (ref 80–99)
MONOCYTES # BLD: 0.9 K/UL (ref 0–1)
MONOCYTES NFR BLD: 8 % (ref 5–13)
NEUTS SEG # BLD: 8.3 K/UL (ref 1.8–8)
NEUTS SEG NFR BLD: 76 % (ref 32–75)
NRBC # BLD: 0 K/UL (ref 0–0.01)
NRBC BLD-RTO: 0 PER 100 WBC
PERFORMED BY, TECHID: ABNORMAL
PERFORMED BY, TECHID: ABNORMAL
PERFORMED BY, TECHID: NORMAL
PERFORMED BY, TECHID: NORMAL
PLATELET # BLD AUTO: 299 K/UL (ref 150–400)
PMV BLD AUTO: 11.3 FL (ref 8.9–12.9)
POTASSIUM SERPL-SCNC: 3.8 MMOL/L (ref 3.5–5.1)
PROCALCITONIN SERPL-MCNC: 4.84 NG/ML
RBC # BLD AUTO: 3.34 M/UL (ref 3.8–5.2)
SODIUM SERPL-SCNC: 141 MMOL/L (ref 136–145)
SPECIAL REQUESTS,SREQ: ABNORMAL
WBC # BLD AUTO: 10.9 K/UL (ref 3.6–11)

## 2022-03-17 PROCEDURE — 74011250636 HC RX REV CODE- 250/636: Performed by: HOSPITALIST

## 2022-03-17 PROCEDURE — 84145 PROCALCITONIN (PCT): CPT

## 2022-03-17 PROCEDURE — 77010033678 HC OXYGEN DAILY

## 2022-03-17 PROCEDURE — 74011250637 HC RX REV CODE- 250/637: Performed by: HOSPITALIST

## 2022-03-17 PROCEDURE — 74011250637 HC RX REV CODE- 250/637: Performed by: INTERNAL MEDICINE

## 2022-03-17 PROCEDURE — 80048 BASIC METABOLIC PNL TOTAL CA: CPT

## 2022-03-17 PROCEDURE — 74011000250 HC RX REV CODE- 250: Performed by: HOSPITALIST

## 2022-03-17 PROCEDURE — 74011250636 HC RX REV CODE- 250/636: Performed by: INTERNAL MEDICINE

## 2022-03-17 PROCEDURE — 85025 COMPLETE CBC W/AUTO DIFF WBC: CPT

## 2022-03-17 PROCEDURE — 65270000029 HC RM PRIVATE

## 2022-03-17 PROCEDURE — 74011636637 HC RX REV CODE- 636/637: Performed by: HOSPITALIST

## 2022-03-17 PROCEDURE — 82962 GLUCOSE BLOOD TEST: CPT

## 2022-03-17 PROCEDURE — 86140 C-REACTIVE PROTEIN: CPT

## 2022-03-17 PROCEDURE — 36415 COLL VENOUS BLD VENIPUNCTURE: CPT

## 2022-03-17 RX ORDER — LOSARTAN POTASSIUM 25 MG/1
25 TABLET ORAL DAILY
Status: DISCONTINUED | OUTPATIENT
Start: 2022-03-17 | End: 2022-03-21 | Stop reason: HOSPADM

## 2022-03-17 RX ORDER — PROPRANOLOL HYDROCHLORIDE 10 MG/1
40 TABLET ORAL 3 TIMES DAILY
Status: DISCONTINUED | OUTPATIENT
Start: 2022-03-17 | End: 2022-03-20

## 2022-03-17 RX ORDER — SODIUM CHLORIDE AND POTASSIUM CHLORIDE .9; .15 G/100ML; G/100ML
SOLUTION INTRAVENOUS CONTINUOUS
Status: DISPENSED | OUTPATIENT
Start: 2022-03-17 | End: 2022-03-18

## 2022-03-17 RX ADMIN — INSULIN LISPRO 3 UNITS: 100 INJECTION, SOLUTION INTRAVENOUS; SUBCUTANEOUS at 21:34

## 2022-03-17 RX ADMIN — Medication 1000 MCG: at 08:41

## 2022-03-17 RX ADMIN — GABAPENTIN 300 MG: 300 CAPSULE ORAL at 21:33

## 2022-03-17 RX ADMIN — PRIMIDONE 250 MG: 250 TABLET ORAL at 16:06

## 2022-03-17 RX ADMIN — MAGNESIUM OXIDE TAB 400 MG (241.3 MG ELEMENTAL MG) 400 MG: 400 (241.3 MG) TAB at 21:33

## 2022-03-17 RX ADMIN — Medication 2000 UNITS: at 08:26

## 2022-03-17 RX ADMIN — SODIUM CHLORIDE, PRESERVATIVE FREE 10 ML: 5 INJECTION INTRAVENOUS at 14:35

## 2022-03-17 RX ADMIN — BACLOFEN 10 MG: 10 TABLET ORAL at 21:34

## 2022-03-17 RX ADMIN — PROPRANOLOL HYDROCHLORIDE 40 MG: 10 TABLET ORAL at 21:32

## 2022-03-17 RX ADMIN — GABAPENTIN 300 MG: 300 CAPSULE ORAL at 08:41

## 2022-03-17 RX ADMIN — POTASSIUM CHLORIDE AND SODIUM CHLORIDE: 900; 150 INJECTION, SOLUTION INTRAVENOUS at 14:35

## 2022-03-17 RX ADMIN — PROPRANOLOL HYDROCHLORIDE 40 MG: 10 TABLET ORAL at 16:06

## 2022-03-17 RX ADMIN — LOSARTAN POTASSIUM 25 MG: 25 TABLET, FILM COATED ORAL at 17:40

## 2022-03-17 RX ADMIN — POTASSIUM CHLORIDE AND SODIUM CHLORIDE: 900; 150 INJECTION, SOLUTION INTRAVENOUS at 03:12

## 2022-03-17 RX ADMIN — VENLAFAXINE HYDROCHLORIDE 75 MG: 75 CAPSULE, EXTENDED RELEASE ORAL at 08:26

## 2022-03-17 RX ADMIN — LEVOTHYROXINE SODIUM 100 MCG: 0.1 TABLET ORAL at 08:26

## 2022-03-17 RX ADMIN — PANCRELIPASE 1 CAPSULE: 60000; 12000; 38000 CAPSULE, DELAYED RELEASE PELLETS ORAL at 12:26

## 2022-03-17 RX ADMIN — MAGNESIUM OXIDE TAB 400 MG (241.3 MG ELEMENTAL MG) 400 MG: 400 (241.3 MG) TAB at 08:26

## 2022-03-17 RX ADMIN — GABAPENTIN 300 MG: 300 CAPSULE ORAL at 16:06

## 2022-03-17 RX ADMIN — ENOXAPARIN SODIUM 40 MG: 100 INJECTION SUBCUTANEOUS at 08:41

## 2022-03-17 RX ADMIN — PANCRELIPASE 1 CAPSULE: 60000; 12000; 38000 CAPSULE, DELAYED RELEASE PELLETS ORAL at 16:06

## 2022-03-17 RX ADMIN — LURASIDONE HYDROCHLORIDE 60 MG: 20 TABLET, FILM COATED ORAL at 21:33

## 2022-03-17 RX ADMIN — CLONAZEPAM 0.5 MG: 1 TABLET ORAL at 21:33

## 2022-03-17 RX ADMIN — VENLAFAXINE HYDROCHLORIDE 75 MG: 75 CAPSULE, EXTENDED RELEASE ORAL at 21:34

## 2022-03-17 RX ADMIN — RISPERIDONE 4 MG: 2 TABLET, FILM COATED ORAL at 21:33

## 2022-03-17 RX ADMIN — ALOGLIPTIN 25 MG: 25 TABLET, FILM COATED ORAL at 09:00

## 2022-03-17 RX ADMIN — BACLOFEN 10 MG: 10 TABLET ORAL at 08:26

## 2022-03-17 RX ADMIN — PROPRANOLOL HYDROCHLORIDE 20 MG: 10 TABLET ORAL at 08:26

## 2022-03-17 RX ADMIN — CLONAZEPAM 0.5 MG: 1 TABLET ORAL at 08:26

## 2022-03-17 RX ADMIN — SODIUM CHLORIDE, PRESERVATIVE FREE 10 ML: 5 INJECTION INTRAVENOUS at 21:35

## 2022-03-17 RX ADMIN — PANCRELIPASE 1 CAPSULE: 60000; 12000; 38000 CAPSULE, DELAYED RELEASE PELLETS ORAL at 08:27

## 2022-03-17 RX ADMIN — ASPIRIN 81 MG: 81 TABLET, COATED ORAL at 08:26

## 2022-03-17 RX ADMIN — CLONAZEPAM 0.5 MG: 1 TABLET ORAL at 16:06

## 2022-03-17 RX ADMIN — MELATONIN TAB 3 MG 3 MG: 3 TAB at 21:33

## 2022-03-17 RX ADMIN — WATER 1 G: 1 INJECTION INTRAMUSCULAR; INTRAVENOUS; SUBCUTANEOUS at 21:35

## 2022-03-17 RX ADMIN — PRIMIDONE 250 MG: 250 TABLET ORAL at 08:26

## 2022-03-17 RX ADMIN — WATER 1 G: 1 INJECTION INTRAMUSCULAR; INTRAVENOUS; SUBCUTANEOUS at 10:24

## 2022-03-17 RX ADMIN — GABAPENTIN 300 MG: 300 CAPSULE ORAL at 00:15

## 2022-03-17 RX ADMIN — PRIMIDONE 250 MG: 250 TABLET ORAL at 21:34

## 2022-03-17 RX ADMIN — SODIUM CHLORIDE, PRESERVATIVE FREE 10 ML: 5 INJECTION INTRAVENOUS at 06:06

## 2022-03-17 RX ADMIN — Medication 1 TABLET: at 08:26

## 2022-03-17 NOTE — PROGRESS NOTES
Problem: Falls - Risk of  Goal: *Absence of Falls  Description: Document Marcellus Counts Fall Risk and appropriate interventions in the flowsheet. Outcome: Progressing Towards Goal  Note: Fall Risk Interventions:  Mobility Interventions: Bed/chair exit alarm    Mentation Interventions: Adequate sleep, hydration, pain control,Bed/chair exit alarm,Reorient patient,More frequent rounding,Familiar objects from home    Medication Interventions: Bed/chair exit alarm,Evaluate medications/consider consulting pharmacy    Elimination Interventions: Bed/chair exit alarm,Call light in reach    History of Falls Interventions: Bed/chair exit alarm         Problem: Patient Education: Go to Patient Education Activity  Goal: Patient/Family Education  Outcome: Progressing Towards Goal     Problem: Pressure Injury - Risk of  Goal: *Prevention of pressure injury  Description: Document Amanuel Scale and appropriate interventions in the flowsheet. Outcome: Progressing Towards Goal  Note: Pressure Injury Interventions:  Sensory Interventions: Maintain/enhance activity level,Minimize linen layers,Pressure redistribution bed/mattress (bed type),Keep linens dry and wrinkle-free    Moisture Interventions: Absorbent underpads,Apply protective barrier, creams and emollients,Internal/External urinary devices,Maintain skin hydration (lotion/cream),Minimize layers    Activity Interventions: Pressure redistribution bed/mattress(bed type)    Mobility Interventions: Assess need for specialty bed,Pressure redistribution bed/mattress (bed type),PT/OT evaluation,Turn and reposition approx.  every two hours(pillow and wedges)    Nutrition Interventions: Discuss nutritional consult with provider    Friction and Shear Interventions: Apply protective barrier, creams and emollients,Feet elevated on foot rest,Foam dressings/transparent film/skin sealants,Minimize layers,Lift team/patient mobility team                Problem: Patient Education: Go to Patient Education Activity  Goal: Patient/Family Education  Outcome: Progressing Towards Goal     Problem: Patient Education: Go to Patient Education Activity  Goal: Patient/Family Education  Outcome: Progressing Towards Goal

## 2022-03-17 NOTE — PROGRESS NOTES
Hospitalist Progress Note               Daily Progress Note: 3/17/2022  Chief Complaint : Altered mental status  Per HPI: 78 y.o. female with a history of diabetes, hypothyroidism, hypertension presents to the emergency room complaining of altered mental status as per . But by the time she came to the ED she was very weak and slow and lethargic but able to wake up and give information. She has difficult time to talk due to the tiredness and dryness. Mouth is getting sticky due to dryness. States started not feeling good 2 weeks ago, treated with antibiotic but when she finished that she started having again symptoms of urinary tract infection. States its frequency and dysuria. Cannot tell if there is any hematuria. And started feeling very lethargy and tiredness. Evaluation in the emergency room found with significant leukocytosis with left shift, hypotension, temperature 102.2. Suspected urinary tract infection, urine that was collected looks very cloudy and dark. She is given fluid bolus with mild improvement in the blood pressure. Admitted to medical telemetry but noted to have hypotension which did not respond to the fluid bolus, transfer to ICU. In ICU she was very dusky with low blood pressure with complete altered mental status and unresponsive. Continued on IV fluids and started on Levophed now she is little better with open eyes and blood pressure improved.     At baseline she is needing help even for bathing due to history of multiple back surgeries. Ambulation is very limited. Subjective: The patient is seen for follow  up.    She is alert,  Afebrile x 24 hrs  spo2 96% on 2 L  bp stable, rising  Wbc improved 32K-->15K-->11k          Problem List:  Problem List as of 3/17/2022 Date Reviewed: 3/14/2022          Codes Class Noted - Resolved    Sepsis ([facility] ICD-10-CM: A41.9  ICD-9-CM: 038.9, 995.91  3/14/2022 - Present        UTI (urinary tract infection) ICD-10-CM: N39.0  ICD-9-CM: 599.0  3/14/2022 - Present        Urinary incontinence ICD-10-CM: R32  ICD-9-CM: 788.30  10/10/2014 - Present        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  ICD-9-CM: 618.00  10/10/2014 - Present        Rectocele ICD-10-CM: N81.6  ICD-9-CM: 618.04  10/10/2014 - Present        DM (diabetes mellitus) ([facility] ICD-10-CM: E11.9  ICD-9-CM: 250.00  5/29/2013 - Present        Unspecified hypothyroidism ICD-10-CM: E03.9  ICD-9-CM: 244.9  5/29/2013 - Present        HTN (hypertension) ICD-10-CM: I10  ICD-9-CM: 401.9  5/29/2013 - Present        Fibromyalgia ICD-10-CM: M79.7  ICD-9-CM: 729.1  5/29/2013 - Present              Medications reviewed  Current Facility-Administered Medications   Medication Dose Route Frequency    lurasidone (LATUDA) tablet 60 mg  60 mg Oral QHS    0.9% sodium chloride with KCl 20 mEq/L infusion   IntraVENous CONTINUOUS    propranoloL (INDERAL) tablet 20 mg  20 mg Oral TID    magnesium oxide (MAG-OX) tablet 400 mg  400 mg Oral BID    venlafaxine-SR (EFFEXOR-XR) capsule 75 mg  75 mg Oral BID    cholecalciferol (VITAMIN D3) (1000 Units /25 mcg) tablet 2,000 Units  2,000 Units Oral DAILY    cyanocobalamin tablet 1,000 mcg  1,000 mcg Oral DAILY    levothyroxine (SYNTHROID) tablet 100 mcg  100 mcg Oral ACB    alogliptin (NESINA) tablet 25 mg  25 mg Oral DAILY    melatonin tablet 3 mg  3 mg Oral QHS    lipase-protease-amylase (CREON 12,000) capsule 1 Capsule  1 Capsule Oral TID WITH MEALS    clonazePAM (KlonoPIN) tablet 0.5 mg  0.5 mg Oral TID    aspirin delayed-release tablet 81 mg  81 mg Oral DAILY    b-complex with vitamin c tablet 1 Tablet  1 Tablet Oral DAILY    baclofen (LIORESAL) tablet 10 mg  10 mg Oral BID    [Held by provider] metFORMIN ER (GLUCOPHAGE XR) tablet 500 mg  500 mg Oral DAILY    [Held by provider] losartan (COZAAR) tablet 25 mg  25 mg Oral DAILY    gabapentin (NEURONTIN) capsule 300 mg  300 mg Oral TID    primidone (MYSOLINE) tablet 250 mg  250 mg Oral TID    risperiDONE (RisperDAL) tablet 4 mg  4 mg Oral QHS    sodium chloride (NS) flush 5-40 mL  5-40 mL IntraVENous Q8H    sodium chloride (NS) flush 5-40 mL  5-40 mL IntraVENous PRN    acetaminophen (TYLENOL) tablet 650 mg  650 mg Oral Q6H PRN    Or    acetaminophen (TYLENOL) suppository 650 mg  650 mg Rectal Q6H PRN    ondansetron (ZOFRAN ODT) tablet 4 mg  4 mg Oral Q6H PRN    Or    ondansetron (ZOFRAN) injection 4 mg  4 mg IntraVENous Q6H PRN    enoxaparin (LOVENOX) injection 40 mg  40 mg SubCUTAneous DAILY    insulin lispro (HUMALOG) injection   SubCUTAneous AC&HS    glucose chewable tablet 16 g  4 Tablet Oral PRN    dextrose 10% infusion 0-250 mL  0-250 mL IntraVENous PRN    glucagon (GLUCAGEN) injection 1 mg  1 mg IntraMUSCular PRN    cefTRIAXone (ROCEPHIN) 1 g in sterile water (preservative free) 10 mL IV syringe  1 g IntraVENous Q12H       Review of Systems:   Review of systems not obtained due to patient factors. Objective:   Physical Exam:     Visit Vitals  BP (!) 162/83   Pulse 79   Temp 99.3 °F (37.4 °C)   Resp 23   Ht 5' 3\" (1.6 m)   Wt 86.5 kg (190 lb 11.2 oz)   SpO2 96%   Breastfeeding No   BMI 33.78 kg/m²    O2 Flow Rate (L/min): 2 l/min O2 Device: Humidifier,Nasal cannula    Temp (24hrs), Av.5 °F (36.9 °C), Min:98 °F (36.7 °C), Max:99.3 °F (37.4 °C)     07 - 1900  In: 560 [P.O.:560]  Out: 1000 [Urine:1000]   03/15 1901 -  0700  In: 4835 [P.O.:175; I.V.:1100]  Out: 2630 [Urine:2630]    General:  Alert/somnolent/cpap mask on, cooperative, no distress, appears stated age. Head:  Normocephalic, without obvious abnormality, atraumatic. Eyes:  Conjunctivae/corneas clear. EOMs intact. Throat: Moist oral mucosa   Neck: Supple, symmetrical, trachea midline, no adenopathy, no JVD. Lungs:   Clear to auscultation bilaterally. Chest wall:  No tenderness or deformity. Heart:  Regular rate and rhythm, S1, S2 normal, no murmur, click, rub or gallop.    Abdomen:   Soft, non-tender, not distended. Bowel sounds present, No rebound or guarding. Extremities: Extremities normal, atraumatic, no cyanosis. No edema   Skin: Warm,dry   Neurologic: CNII-XII intact. No motor or sensory deficits. Data Review:       Recent Days:  Recent Labs     03/17/22  0441 03/16/22  0627 03/15/22  0523   WBC 10.9 14.9* 31.7*   HGB 10.1* 10.2* 10.6*   HCT 31.4* 31.5* 33.5*    251 255     Recent Labs     03/17/22  0441 03/16/22  0627 03/15/22  0523    143 136   K 3.8 3.5 3.5   * 116* 108   CO2 20* 21 19*   * 130* 185*   BUN 13 16 19   CREA 0.54* 0.73 1.24*   CA 8.8 8.3* 7.5*   MG  --  2.1 1.2*   PHOS  --   --  3.2   ALB  --   --  2.6*     No results for input(s): PH, PCO2, PO2, HCO3, FIO2 in the last 72 hours. 24 Hour Results:  Recent Results (from the past 24 hour(s))   GLUCOSE, POC    Collection Time: 03/16/22  9:02 PM   Result Value Ref Range    Glucose (POC) 144 (H) 65 - 117 mg/dL    Performed by Courtney Ex[facility]    CBC WITH AUTOMATED DIFF    Collection Time: 03/17/22  4:41 AM   Result Value Ref Range    WBC 10.9 3.6 - 11.0 K/uL    RBC 3.34 (L) 3.80 - 5.20 M/uL    HGB 10.1 (L) 11.5 - 16.0 g/dL    HCT 31.4 (L) 35.0 - 47.0 %    MCV 94.0 80.0 - 99.0 FL    MCH 30.2 26.0 - 34.0 PG    MCHC 32.2 30.0 - 36.5 g/dL    RDW 14.6 (H) 11.5 - 14.5 %    PLATELET 531 015 - 151 K/uL    MPV 11.3 8.9 - 12.9 FL    NRBC 0.0 0.0  WBC    ABSOLUTE NRBC 0.00 0.00 - 0.01 K/uL    NEUTROPHILS 76 (H) 32 - 75 %    LYMPHOCYTES 13 12 - 49 %    MONOCYTES 8 5 - 13 %    EOSINOPHILS 2 0 - 7 %    BASOPHILS 0 0 - 1 %    IMMATURE GRANULOCYTES 1 (H) 0 - 0.5 %    ABS. NEUTROPHILS 8.3 (H) 1.8 - 8.0 K/UL    ABS. LYMPHOCYTES 1.4 0.8 - 3.5 K/UL    ABS. MONOCYTES 0.9 0.0 - 1.0 K/UL    ABS. EOSINOPHILS 0.2 0.0 - 0.4 K/UL    ABS. BASOPHILS 0.0 0.0 - 0.1 K/UL    ABS. IMM.  GRANS. 0.1 (H) 0.00 - 0.04 K/UL    DF AUTOMATED     METABOLIC PANEL, BASIC    Collection Time: 03/17/22  4:41 AM   Result Value Ref Range Sodium 141 136 - 145 mmol/L    Potassium 3.8 3.5 - 5.1 mmol/L    Chloride 115 (H) 97 - 108 mmol/L    CO2 20 (L) 21 - 32 mmol/L    Anion gap 6 5 - 15 mmol/L    Glucose 140 (H) 65 - 100 mg/dL    BUN 13 6 - 20 mg/dL    Creatinine 0.54 (L) 0.55 - 1.02 mg/dL    BUN/Creatinine ratio 24 (H) 12 - 20      GFR est AA >60 >60 ml/min/1.73m2    GFR est non-AA >60 >60 ml/min/1.73m2    Calcium 8.8 8.5 - 10.1 mg/dL   C REACTIVE PROTEIN, QT    Collection Time: 03/17/22  4:41 AM   Result Value Ref Range    C-Reactive protein 27.80 (H) 0.00 - 0.60 mg/dL   PROCALCITONIN    Collection Time: 03/17/22  4:41 AM   Result Value Ref Range    Procalcitonin 4.84 (H) 0 ng/mL   GLUCOSE, POC    Collection Time: 03/17/22  7:30 AM   Result Value Ref Range    Glucose (POC) 118 (H) 65 - 117 mg/dL    Performed by [address] [place] [address] Dr Jimenez, POC    Collection Time: 03/17/22 11:50 AM   Result Value Ref Range    Glucose (POC) 117 65 - 117 mg/dL    Performed by [address] [place] [address] Dr Jimenez, POC    Collection Time: 03/17/22  3:22 PM   Result Value Ref Range    Glucose (POC) 114 65 - 117 mg/dL    Performed by Celia Dorchester        chest X-ray    Assessment/     Patient Active Problem List   Diagnosis Code    DM (diabetes mellitus) ([facility] E11.9    Unspecified hypothyroidism E03.9    HTN (hypertension) I10    Fibromyalgia M79.7    Urinary incontinence R32    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 Patient has had recurrent uti's over the past 2-3 years per darwin ran out of prohylaxis > 1 month ago.    procal descending, crp elevated and marginally up. follow      Diabetes mellitus type 2:   Pta On small dose of Metformin and sitagliptin 100 mg daily. Resumed with nesina. Metformin held. Continue ssi/hypoglycemia protocol  Bg's well controled/24 hrs    Severe dehydration: resolving  Likely secondary to poor oral intake,   Continue judicious hydration     Significant neuropathy secondary to history of back problems with surgeries, on multiple medications which we will continue as condition dictates     Hypothyroidism:   Continues synthroid,  tsh wnl     Benign essential hypertension: On propranolol and Cozaar, held in lieu of hypotension/shock. restart when condition dictates. Will resumed proranolol and titrate to pta dose,  Resume cozar and follow. Hypomag:   Repleted and follow  Follow mag     Full code  Vtep: lovenox  Dispo: pending course, May transfer out of icu to [address] discussed with: Patient/Family and Nurse    Total time spent with patient: 30 minutes. This dictation was done by dragon, computer voice recognition software. Often unanticipated grammatical, syntax, phones and other interpretive errors are inadvertently transcribed. Please excuse errors that have escaped final proofreading.     Singh Garcia MD

## 2022-03-18 ENCOUNTER — APPOINTMENT (OUTPATIENT)
Dept: GENERAL RADIOLOGY | Age: 67
DRG: 871 | End: 2022-03-18
Attending: INTERNAL MEDICINE
Payer: MEDICARE

## 2022-03-18 LAB
ANION GAP SERPL CALC-SCNC: 5 MMOL/L (ref 5–15)
BASOPHILS # BLD: 0.1 K/UL (ref 0–0.1)
BASOPHILS NFR BLD: 1 % (ref 0–1)
BNP SERPL-MCNC: 5489 PG/ML
BUN SERPL-MCNC: 12 MG/DL (ref 6–20)
BUN/CREAT SERPL: 21 (ref 12–20)
CA-I BLD-MCNC: 9 MG/DL (ref 8.5–10.1)
CHLORIDE SERPL-SCNC: 111 MMOL/L (ref 97–108)
CO2 SERPL-SCNC: 25 MMOL/L (ref 21–32)
CREAT SERPL-MCNC: 0.57 MG/DL (ref 0.55–1.02)
CRP SERPL-MCNC: 14 MG/DL (ref 0–0.6)
DIFFERENTIAL METHOD BLD: ABNORMAL
EOSINOPHIL # BLD: 0.3 K/UL (ref 0–0.4)
EOSINOPHIL NFR BLD: 3 % (ref 0–7)
ERYTHROCYTE [DISTWIDTH] IN BLOOD BY AUTOMATED COUNT: 14.6 % (ref 11.5–14.5)
GLUCOSE BLD STRIP.AUTO-MCNC: 121 MG/DL (ref 65–117)
GLUCOSE BLD STRIP.AUTO-MCNC: 137 MG/DL (ref 65–117)
GLUCOSE BLD STRIP.AUTO-MCNC: 151 MG/DL (ref 65–117)
GLUCOSE BLD STRIP.AUTO-MCNC: 163 MG/DL (ref 65–117)
GLUCOSE SERPL-MCNC: 122 MG/DL (ref 65–100)
HCT VFR BLD AUTO: 32.6 % (ref 35–47)
HGB BLD-MCNC: 10.4 G/DL (ref 11.5–16)
IMM GRANULOCYTES # BLD AUTO: 0.1 K/UL (ref 0–0.04)
IMM GRANULOCYTES NFR BLD AUTO: 1 % (ref 0–0.5)
LYMPHOCYTES # BLD: 1.9 K/UL (ref 0.8–3.5)
LYMPHOCYTES NFR BLD: 16 % (ref 12–49)
MCH RBC QN AUTO: 29.7 PG (ref 26–34)
MCHC RBC AUTO-ENTMCNC: 31.9 G/DL (ref 30–36.5)
MCV RBC AUTO: 93.1 FL (ref 80–99)
MONOCYTES # BLD: 1.1 K/UL (ref 0–1)
MONOCYTES NFR BLD: 10 % (ref 5–13)
NEUTS SEG # BLD: 8 K/UL (ref 1.8–8)
NEUTS SEG NFR BLD: 69 % (ref 32–75)
NRBC # BLD: 0 K/UL (ref 0–0.01)
NRBC BLD-RTO: 0 PER 100 WBC
PERFORMED BY, TECHID: ABNORMAL
PLATELET # BLD AUTO: 300 K/UL (ref 150–400)
PMV BLD AUTO: 10.9 FL (ref 8.9–12.9)
POTASSIUM SERPL-SCNC: 3.8 MMOL/L (ref 3.5–5.1)
RBC # BLD AUTO: 3.5 M/UL (ref 3.8–5.2)
SODIUM SERPL-SCNC: 141 MMOL/L (ref 136–145)
WBC # BLD AUTO: 11.4 K/UL (ref 3.6–11)

## 2022-03-18 PROCEDURE — 65270000029 HC RM PRIVATE

## 2022-03-18 PROCEDURE — 71045 X-RAY EXAM CHEST 1 VIEW: CPT

## 2022-03-18 PROCEDURE — 74011250637 HC RX REV CODE- 250/637: Performed by: HOSPITALIST

## 2022-03-18 PROCEDURE — 74011250636 HC RX REV CODE- 250/636: Performed by: HOSPITALIST

## 2022-03-18 PROCEDURE — 74011250637 HC RX REV CODE- 250/637: Performed by: INTERNAL MEDICINE

## 2022-03-18 PROCEDURE — 92526 ORAL FUNCTION THERAPY: CPT

## 2022-03-18 PROCEDURE — 82962 GLUCOSE BLOOD TEST: CPT

## 2022-03-18 PROCEDURE — 77010033678 HC OXYGEN DAILY

## 2022-03-18 PROCEDURE — 36415 COLL VENOUS BLD VENIPUNCTURE: CPT

## 2022-03-18 PROCEDURE — 80048 BASIC METABOLIC PNL TOTAL CA: CPT

## 2022-03-18 PROCEDURE — 83880 ASSAY OF NATRIURETIC PEPTIDE: CPT

## 2022-03-18 PROCEDURE — 74011000250 HC RX REV CODE- 250: Performed by: HOSPITALIST

## 2022-03-18 PROCEDURE — 86140 C-REACTIVE PROTEIN: CPT

## 2022-03-18 PROCEDURE — 85025 COMPLETE CBC W/AUTO DIFF WBC: CPT

## 2022-03-18 PROCEDURE — 94761 N-INVAS EAR/PLS OXIMETRY MLT: CPT

## 2022-03-18 RX ADMIN — ALOGLIPTIN 25 MG: 25 TABLET, FILM COATED ORAL at 10:31

## 2022-03-18 RX ADMIN — SODIUM CHLORIDE, PRESERVATIVE FREE 10 ML: 5 INJECTION INTRAVENOUS at 14:00

## 2022-03-18 RX ADMIN — LOSARTAN POTASSIUM 25 MG: 25 TABLET, FILM COATED ORAL at 10:32

## 2022-03-18 RX ADMIN — PANCRELIPASE 1 CAPSULE: 60000; 12000; 38000 CAPSULE, DELAYED RELEASE PELLETS ORAL at 16:50

## 2022-03-18 RX ADMIN — PRIMIDONE 250 MG: 250 TABLET ORAL at 10:32

## 2022-03-18 RX ADMIN — MAGNESIUM OXIDE TAB 400 MG (241.3 MG ELEMENTAL MG) 400 MG: 400 (241.3 MG) TAB at 21:00

## 2022-03-18 RX ADMIN — CLONAZEPAM 0.5 MG: 1 TABLET ORAL at 16:50

## 2022-03-18 RX ADMIN — RISPERIDONE 4 MG: 2 TABLET, FILM COATED ORAL at 22:16

## 2022-03-18 RX ADMIN — CLONAZEPAM 0.5 MG: 1 TABLET ORAL at 10:31

## 2022-03-18 RX ADMIN — ASPIRIN 81 MG: 81 TABLET, COATED ORAL at 10:31

## 2022-03-18 RX ADMIN — PRIMIDONE 250 MG: 250 TABLET ORAL at 16:50

## 2022-03-18 RX ADMIN — Medication 1 TABLET: at 10:31

## 2022-03-18 RX ADMIN — ENOXAPARIN SODIUM 40 MG: 100 INJECTION SUBCUTANEOUS at 10:32

## 2022-03-18 RX ADMIN — Medication 1000 MCG: at 10:31

## 2022-03-18 RX ADMIN — MELATONIN TAB 3 MG 3 MG: 3 TAB at 22:16

## 2022-03-18 RX ADMIN — PANCRELIPASE 1 CAPSULE: 60000; 12000; 38000 CAPSULE, DELAYED RELEASE PELLETS ORAL at 08:00

## 2022-03-18 RX ADMIN — CLONAZEPAM 0.5 MG: 1 TABLET ORAL at 22:16

## 2022-03-18 RX ADMIN — BACLOFEN 10 MG: 10 TABLET ORAL at 10:32

## 2022-03-18 RX ADMIN — PROPRANOLOL HYDROCHLORIDE 40 MG: 10 TABLET ORAL at 16:50

## 2022-03-18 RX ADMIN — PROPRANOLOL HYDROCHLORIDE 40 MG: 10 TABLET ORAL at 10:32

## 2022-03-18 RX ADMIN — PROPRANOLOL HYDROCHLORIDE 40 MG: 10 TABLET ORAL at 22:15

## 2022-03-18 RX ADMIN — BACLOFEN 10 MG: 10 TABLET ORAL at 22:15

## 2022-03-18 RX ADMIN — GABAPENTIN 300 MG: 300 CAPSULE ORAL at 22:16

## 2022-03-18 RX ADMIN — VENLAFAXINE HYDROCHLORIDE 75 MG: 75 CAPSULE, EXTENDED RELEASE ORAL at 21:00

## 2022-03-18 RX ADMIN — VENLAFAXINE HYDROCHLORIDE 75 MG: 75 CAPSULE, EXTENDED RELEASE ORAL at 10:31

## 2022-03-18 RX ADMIN — WATER 1 G: 1 INJECTION INTRAMUSCULAR; INTRAVENOUS; SUBCUTANEOUS at 22:16

## 2022-03-18 RX ADMIN — WATER 1 G: 1 INJECTION INTRAMUSCULAR; INTRAVENOUS; SUBCUTANEOUS at 10:32

## 2022-03-18 RX ADMIN — LURASIDONE HYDROCHLORIDE 60 MG: 20 TABLET, FILM COATED ORAL at 22:00

## 2022-03-18 RX ADMIN — GABAPENTIN 300 MG: 300 CAPSULE ORAL at 10:32

## 2022-03-18 RX ADMIN — Medication 2000 UNITS: at 10:32

## 2022-03-18 RX ADMIN — LEVOTHYROXINE SODIUM 100 MCG: 0.1 TABLET ORAL at 10:32

## 2022-03-18 RX ADMIN — GABAPENTIN 300 MG: 300 CAPSULE ORAL at 16:50

## 2022-03-18 RX ADMIN — PRIMIDONE 250 MG: 250 TABLET ORAL at 22:15

## 2022-03-18 RX ADMIN — SODIUM CHLORIDE, PRESERVATIVE FREE 10 ML: 5 INJECTION INTRAVENOUS at 05:21

## 2022-03-18 RX ADMIN — MAGNESIUM OXIDE TAB 400 MG (241.3 MG ELEMENTAL MG) 400 MG: 400 (241.3 MG) TAB at 10:32

## 2022-03-18 NOTE — PROGRESS NOTES
Problem: Dysphagia (Adult)  Goal: *Acute Goals and Plan of Care (Insert Text)  Description: Speech Therapy Goals  Initiated 3/15/2022  -Patient will tolerate minced and moist diet with mildly/nectar thick liquids without signs/symptoms of aspiration given minimal cues within 7 day(s). [ ] Not met  [ ]  MET   [ x] Progressing  [ ] Discontinue  -Patient will demonstrate understanding of swallow safety precautions and aspiration precautions, diet recs with minimal cues within 7 day(s). [ ] Not met  [ ]  MET   [ x] Progressing  [ ] Discontinue  Outcome: Progressing Towards Goal   SPEECH [address] TREATMENT  Patient: Nikki Lee (67 y.o. female)  Date: 3/18/2022  Diagnosis: Sepsis ([facility] [A41.9]  UTI (urinary tract infection) [N39.0] <principal problem not specified>       Precautions: aspiration      ASSESSMENT:  Pt seen for follow-up, positioned upright in bed. Pt is alert and talkative, appropriate. Pt's dentures remain at home and pt's  reports wanting to keep them at home for now. Pt given trials of thin via cup/straw, puree and moistened solids. Oral phase with mildly disorganized mastication and delayed clearance, but overall functional. Pt is able to clear oral cavity. Pharyngeal phase with min delay, WNL once initiated. Pt tolerates all trials without overt s/s aspiration. PLAN:  Recommendations and Planned Interventions: At this time, recommend diet upgrade to minced and moist/thin with aspiration and GERD precautions, meds as tolerated. This will likely be pt's least restrictive diet until dentures are replaced. ? Need for cog-ling assessment pending pt's progress, significant improvement is noted. Patient continues to benefit from skilled intervention to address the above impairments. Continue treatment per established plan of care. Frequency/Duration: Patient will be followed by speech-language pathology 1 time a week to address goals.   Discharge Recommendations:  cont SLP follow-up with reduced frequency. SUBJECTIVE:   Patient alert, agreeable. Pt's  Fanny Weaver is present with pt's consent. OBJECTIVE:     CXR Results  (Last 48 hours)                 03/18/22 0839  XR CHEST PORT Final result    Narrative:  Chest single view. Comparison single view chest March 16, 2022. Unchanged mild coarse reticular markings throughout the lungs. No lobar opacity   to suggest pneumonia. Cardiac and mediastinal structures unchanged. No   pneumothorax or sizable pleural effusion. Cognitive and Communication Status:  Neurologic State: Alert  Orientation Level: Oriented to person,Oriented to place  Cognition: Poor safety awareness  Pain:  0    After treatment:   Patient left in no apparent distress in bed, Call bell within reach, Nursing notified, Caregiver / family present, and Bed / chair alarm activated    COMMUNICATION/EDUCATION:   Patient was educated regarding her deficit(s) of dysphagia, swallow safety precautions, diet recs and POC. She demonstrated Good understanding as evidenced by verbal responsiveness. The patient's plan of care including recommendations, planned interventions, and recommended diet changes were discussed with: Registered nurse.      Lorena Marin M.S. CCC-SLP  Time Calculation: 15 mins

## 2022-03-18 NOTE — WOUND CARE
IP WOUND CONSULT    [address] RECORD NUMBER:  [id number]  AGE: 78 y.o. GENDER: female  : 1955  TODAY'S DATE:  3/18/2022    GENERAL     [] Follow-up   [x] New Consult    Darleen Perez is a 78 y.o. female referred by:   [x] Physician  [] Nursing  [] Other:         PAST MEDICAL HISTORY    Past Medical History:   Diagnosis Date    Arthritis     Chronic pain     back pain    Chronic pain     fibromyalgia    Diabetes ([facility]     steroid induced per pt    Fibromyalgia     chronic pain    GERD (gastroesophageal reflux disease)     Hypercholesteremia     Hypertension     Hypothyroid     hypothyroid    Migraine     Morbid obesity ([facility]     Other ill-defined conditions(799.89)     extreme dry mouth (due to meds pt stated)    Pernicious anemia     Psychiatric disorder     anxiety and depression    Unspecified sleep apnea     cpap    Urinary incontinence         PAST SURGICAL HISTORY    Past Surgical History:   Procedure Laterality Date    HX APPENDECTOMY      HX BREAST LUMPECTOMY  2011    left    HX CHOLECYSTECTOMY      HX ORTHOPAEDIC      x4 spine surgeries    HX ORTHOPAEDIC  13    L2-5 DECOMPRESSION AND FUSION POSTERIOR MULTIILEVEL    HX OTHER SURGICAL      laparoscopy - not sure why    HX TOTAL ABDOMINAL HYSTERECTOMY      menorrhagia, dysmenorrhea;  thinks she may have had one ovary removed.     HX UROLOGICAL      surgery for incontinence    HX UROLOGICAL  10/10/14    Urodynamics       FAMILY HISTORY    Family History   Problem Relation Age of Onset    Hypertension Mother     Diabetes Mother     Stroke Mother     Heart Disease Mother     Hypertension Father     Heart Disease Father     Diabetes Father     Stroke Father     Diabetes Brother     Hypertension Brother     Stroke Brother     Heart Disease Brother     Heart Disease Brother     Diabetes Brother     Hypertension Brother     COPD Brother          ALLERGIES    Allergies   Allergen Reactions    Bactrim [Sulfamethoprim Ds] Hives    Other Medication Anaphylaxis and Swelling     Throat closed Unknown pain medication by injection 2 days postop(placed on ventilator for 6 days)    Sulfa (Sulfonamide Antibiotics) Hives and Itching    Ace Inhibitors Other (comments)     Causes liver enzymes to elevate       MEDICATIONS    No current facility-administered medications on file prior to encounter. Current Outpatient Medications on File Prior to Encounter   Medication Sig Dispense Refill    lurasidone (Latuda) 60 mg tab tablet Take 60 mg by mouth nightly.  metFORMIN ER (GLUCOPHAGE XR) 500 mg tablet Take 500 mg by mouth daily.  fenofibrate (LOFIBRA) 54 mg tablet Take 54 mg by mouth daily.  losartan (COZAAR) 25 mg tablet Take 25 mg by mouth daily.  propranoloL (INDERAL) 60 mg tablet Take 60 mg by mouth three (3) times daily.  gabapentin (NEURONTIN) 300 mg capsule Take 300 mg by mouth three (3) times daily.  primidone (MYSOLINE) 250 mg tablet Take 250 mg by mouth three (3) times daily.  risperiDONE (RisperDAL) 4 mg tablet Take 4 mg by mouth nightly.  venlafaxine-SR (EFFEXOR-XR) 75 mg capsule Take 75 mg by mouth three (3) times daily.  traZODone (DESYREL) 150 mg tablet Take 150 mg by mouth nightly.  aspirin delayed-release 81 mg tablet Take  by mouth daily.  b-complex with vitamin c tablet Take 1 tablet by mouth daily.  baclofen (LIORESAL) 10 mg tablet Take 10 mg by mouth two (2) times a day.  clonazePAM (KLONOPIN) 0.5 mg tablet Take 1 Tab by mouth three (3) times daily. 90 Tab 0    cholecalciferol, vitamin D3, (VITAMIN D3) 2,000 unit Tab Take 1 Tab by mouth daily.  cyanocobalamin (VITAMIN B-12) 1,000 mcg tablet Take 1,000 mcg by mouth daily.  levothyroxine (LEVOTHROID) 100 mcg tablet Take 100 mcg by mouth Daily (before breakfast).  sitaGLIPtin (JANUVIA) 100 mg tablet Take 100 mg by mouth daily.  Only takes if glucose is above 150 every morning  Indications: TYPE 2 DIABETES MELLITUS      melatonin 3 mg tablet Take 3 mg by mouth nightly.  LIPASE/PROTEASE/AMYLASE (PANCRELIPASE 5000 PO) Take 4 Tabs by mouth three (3) times daily (with meals). [unfilled]  Visit Vitals  BP (!) 141/94 (BP 1 Location: Right upper arm, BP Patient Position: Semi fowlers; At rest)   Pulse 80   Temp 97.9 °F (36.6 °C)   Resp 22   Ht 5' 3\" (1.6 m)   Wt 86.5 kg (190 lb 11.2 oz)   SpO2 96%   Breastfeeding No   BMI 33.78 kg/m²       ASSESSMENT     Wound Identification & Type: MASD to Sacrum  Dressing change: See flow chart  Verbal consent for picture: Yes    Contributing Factors: diabetes and incontinence of urine    Wound Sacral/coccyx (Active)   Wound Etiology Other (Comment) 03/18/22 1557   Dressing Status New dressing applied 03/18/22 1557   Cleansed Cleansed with saline 03/18/22 1557   Dressing/Treatment Zinc paste 03/18/22 1557   Drainage Amount None 03/18/22 1557   Wound Odor None 03/18/22 1557   Lani-Wound/Incision Assessment Blanchable erythema 03/18/22 1557   Number of days: 0          PLAN     Skin Care & Pressure Relief Recommendations  Minimize layers of linen  Pads under patient to optimize support surface  Turn/reposition approximately every 2 hours  Pillow wedges  Manage incontinence   Promote continence; Skin Protective lotion/cream to buttocks and sacrum daily and as needed with incontinence care    Amanuel : 12   Blood Glucose: 121                       Albumin: 2.6  WBCs: 10.9    Support Surface: Isoflex gel with pump    Physician/Provider notified: Yes  Recommendations: Patient  present during evaluation, patient stated he could stay in the room for evaluation.  states that patient has had breakdown in Sacrum area in the past when admitted at another facility for an extended period of time. Moisture Associated skin damage noted to sacrum, skin blanches. Patient has a Purewic and is on an Isoflex gel bed with pump.  Use Remedy Phytoplex Barrier Cream wipes for gentle cleansing. To completely remove, use Remedy Foaming Cleanser or soap and water. Apply zinc paste to area TID and PRN for soiling. Turn/reposition patient approximately every 2 hours with pillow to assist in offloading and reducing pressure to sacrum. Velvet Lines will continue to monitor, please consult if there are changes in skin.      Discharge Wound Care Needs:TBD    Teaching completed with:   [] Patient           [] Family member       [] Caregiver          [] Nursing  [] Other    Patient/Caregiver Teaching:  Level of patient/caregiver understanding able to:   [] Indicates understanding       [] Needs reinforcement  [] Unsuccessful      [] Verbal Understanding  [] Demonstrated understanding       [] No evidence of learning  [] Refused teaching         [] N/A       Electronically signed by Kelle Clayton on 3/18/2022 at 4:07 PM

## 2022-03-18 NOTE — PROGRESS NOTES
Hospitalist Progress Note               Daily Progress Note: 3/18/2022  Chief Complaint : Altered mental status  Per HPI: 78 y.o. female with a history of diabetes, hypothyroidism, hypertension presents to the emergency room complaining of altered mental status as per . But by the time she came to the ED she was very weak and slow and lethargic but able to wake up and give information. She has difficult time to talk due to the tiredness and dryness. Mouth is getting sticky due to dryness. States started not feeling good 2 weeks ago, treated with antibiotic but when she finished that she started having again symptoms of urinary tract infection. States its frequency and dysuria. Cannot tell if there is any hematuria. And started feeling very lethargy and tiredness. Evaluation in the emergency room found with significant leukocytosis with left shift, hypotension, temperature 102.2. Suspected urinary tract infection, urine that was collected looks very cloudy and dark. She is given fluid bolus with mild improvement in the blood pressure. Admitted to medical telemetry but noted to have hypotension which did not respond to the fluid bolus, transfer to ICU. In ICU she was very dusky with low blood pressure with complete altered mental status and unresponsive. Continued on IV fluids and started on Levophed now she is little better with open eyes and blood pressure improved.     At baseline she is needing help even for bathing due to history of multiple back surgeries. Ambulation is very limited. Subjective: The patient is seen for follow  up.    Transferred out of icu 3/17/22  Much more alert, nc 21l/m spo2 95%      She is alert,  Afebrile x 24 hrs  spo2 96% on 2 L  bp stable, rising  Wbc improved 32K-->15K-->11k-->11K  ucs e coli, pan sensitive though intermediate for zosyn        Problem List:  Problem List as of 3/18/2022 Date Reviewed: 3/14/2022          Codes Class Noted - Resolved    Sepsis [facility]) ICD-10-CM: A41.9  ICD-9-CM: 038.9, 995.91  3/14/2022 - Present        UTI (urinary tract infection) ICD-10-CM: N39.0  ICD-9-CM: 599.0  3/14/2022 - Present        Urinary incontinence ICD-10-CM: R32  ICD-9-CM: 788.30  10/10/2014 - Present        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  ICD-9-CM: 618.00  10/10/2014 - Present        Rectocele ICD-10-CM: N81.6  ICD-9-CM: 618.04  10/10/2014 - Present        DM (diabetes mellitus) ([facility] ICD-10-CM: E11.9  ICD-9-CM: 250.00  5/29/2013 - Present        Unspecified hypothyroidism ICD-10-CM: E03.9  ICD-9-CM: 244.9  5/29/2013 - Present        HTN (hypertension) ICD-10-CM: I10  ICD-9-CM: 401.9  5/29/2013 - Present        Fibromyalgia ICD-10-CM: M79.7  ICD-9-CM: 729.1  5/29/2013 - Present              Medications reviewed  Current Facility-Administered Medications   Medication Dose Route Frequency    [START ON 3/19/2022] zinc oxide-white petrolatum 20-75 % topical paste   Topical TID    lurasidone (LATUDA) tablet 60 mg  60 mg Oral QHS    losartan (COZAAR) tablet 25 mg  25 mg Oral DAILY    propranoloL (INDERAL) tablet 40 mg  40 mg Oral TID    0.9% sodium chloride with KCl 20 mEq/L infusion   IntraVENous CONTINUOUS    magnesium oxide (MAG-OX) tablet 400 mg  400 mg Oral BID    venlafaxine-SR (EFFEXOR-XR) capsule 75 mg  75 mg Oral BID    cholecalciferol (VITAMIN D3) (1000 Units /25 mcg) tablet 2,000 Units  2,000 Units Oral DAILY    cyanocobalamin tablet 1,000 mcg  1,000 mcg Oral DAILY    levothyroxine (SYNTHROID) tablet 100 mcg  100 mcg Oral ACB    alogliptin (NESINA) tablet 25 mg  25 mg Oral DAILY    melatonin tablet 3 mg  3 mg Oral QHS    lipase-protease-amylase (CREON 12,000) capsule 1 Capsule  1 Capsule Oral TID WITH MEALS    clonazePAM (KlonoPIN) tablet 0.5 mg  0.5 mg Oral TID    aspirin delayed-release tablet 81 mg  81 mg Oral DAILY    b-complex with vitamin c tablet 1 Tablet  1 Tablet Oral DAILY    baclofen (LIORESAL) tablet 10 mg  10 mg Oral BID    [Held by provider] metFORMIN ER (GLUCOPHAGE XR) tablet 500 mg  500 mg Oral DAILY    [Held by provider] losartan (COZAAR) tablet 25 mg  25 mg Oral DAILY    gabapentin (NEURONTIN) capsule 300 mg  300 mg Oral TID    primidone (MYSOLINE) tablet 250 mg  250 mg Oral TID    risperiDONE (RisperDAL) tablet 4 mg  4 mg Oral QHS    sodium chloride (NS) flush 5-40 mL  5-40 mL IntraVENous Q8H    sodium chloride (NS) flush 5-40 mL  5-40 mL IntraVENous PRN    acetaminophen (TYLENOL) tablet 650 mg  650 mg Oral Q6H PRN    Or    acetaminophen (TYLENOL) suppository 650 mg  650 mg Rectal Q6H PRN    ondansetron (ZOFRAN ODT) tablet 4 mg  4 mg Oral Q6H PRN    Or    ondansetron (ZOFRAN) injection 4 mg  4 mg IntraVENous Q6H PRN    enoxaparin (LOVENOX) injection 40 mg  40 mg SubCUTAneous DAILY    insulin lispro (HUMALOG) injection   SubCUTAneous AC&HS    glucose chewable tablet 16 g  4 Tablet Oral PRN    dextrose 10% infusion 0-250 mL  0-250 mL IntraVENous PRN    glucagon (GLUCAGEN) injection 1 mg  1 mg IntraMUSCular PRN    cefTRIAXone (ROCEPHIN) 1 g in sterile water (preservative free) 10 mL IV syringe  1 g IntraVENous Q12H           Objective:   Physical Exam:     Visit Vitals  BP (!) 141/94 (BP 1 Location: Right upper arm, BP Patient Position: Semi fowlers; At rest)   Pulse 80   Temp 97.9 °F (36.6 °C)   Resp 22   Ht 5' 3\" (1.6 m)   Wt 86.5 kg (190 lb 11.2 oz)   SpO2 96%   Breastfeeding No   BMI 33.78 kg/m²    O2 Flow Rate (L/min): 1 l/min O2 Device: Nasal cannula    Temp (24hrs), Av.2 °F (36.8 °C), Min:97.3 °F (36.3 °C), Max:100.3 °F (37.9 °C)    No intake/output data recorded.  1901 -  0700  In: 7485 [P.O.:635; I.V.:1100]  Out: 3450 [Urine:3450]    General:  Alert/mores and more interactive , cooperative, no distress, appears stated age. Head:  Normocephalic, without obvious abnormality, atraumatic. Eyes:  Conjunctivae/corneas clear. EOMs intact.    Throat: Moist oral mucosa   Neck: Supple, symmetrical, trachea midline, no adenopathy, no JVD. Lungs:   Clear to auscultation bilaterally. Chest wall:  No tenderness or deformity. Heart:  Regular rate and rhythm, S1, S2 normal, no murmur, click, rub or gallop. Abdomen:   Soft, non-tender, not distended. Bowel sounds present, No rebound or guarding. Extremities: Extremities normal, atraumatic, no cyanosis. No edema   Skin: Warm,dry   Neurologic: CNII-XII intact. No motor or sensory deficits. Data Review:       Recent Days:  Recent Labs     03/18/22 0636 03/17/22 0441 03/16/22 0627   WBC 11.4* 10.9 14.9*   HGB 10.4* 10.1* 10.2*   HCT 32.6* 31.4* 31.5*    299 251     Recent Labs     03/18/22 0636 03/17/22 0441 03/16/22 0627    141 143   K 3.8 3.8 3.5   * 115* 116*   CO2 25 20* 21   * 140* 130*   BUN 12 13 16   CREA 0.57 0.54* 0.73   CA 9.0 8.8 8.3*   MG  --   --  2.1     No results for input(s): PH, PCO2, PO2, HCO3, FIO2 in the last 72 hours. 24 Hour Results:  Recent Results (from the past 24 hour(s))   GLUCOSE, POC    Collection Time: 03/17/22  9:11 PM   Result Value Ref Range    Glucose (POC) 157 (H) 65 - 117 mg/dL    Performed by PERSON RAYTRINIQUE    CBC WITH AUTOMATED DIFF    Collection Time: 03/18/22  6:36 AM   Result Value Ref Range    WBC 11.4 (H) 3.6 - 11.0 K/uL    RBC 3.50 (L) 3.80 - 5.20 M/uL    HGB 10.4 (L) 11.5 - 16.0 g/dL    HCT 32.6 (L) 35.0 - 47.0 %    MCV 93.1 80.0 - 99.0 FL    MCH 29.7 26.0 - 34.0 PG    MCHC 31.9 30.0 - 36.5 g/dL    RDW 14.6 (H) 11.5 - 14.5 %    PLATELET 466 872 - 950 K/uL    MPV 10.9 8.9 - 12.9 FL    NRBC 0.0 0.0  WBC    ABSOLUTE NRBC 0.00 0.00 - 0.01 K/uL    NEUTROPHILS 69 32 - 75 %    LYMPHOCYTES 16 12 - 49 %    MONOCYTES 10 5 - 13 %    EOSINOPHILS 3 0 - 7 %    BASOPHILS 1 0 - 1 %    IMMATURE GRANULOCYTES 1 (H) 0 - 0.5 %    ABS. NEUTROPHILS 8.0 1.8 - 8.0 K/UL    ABS. LYMPHOCYTES 1.9 0.8 - 3.5 K/UL    ABS. MONOCYTES 1.1 (H) 0.0 - 1.0 K/UL    ABS.  EOSINOPHILS 0.3 0.0 - 0.4 K/UL ABS. BASOPHILS 0.1 0.0 - 0.1 K/UL    ABS. IMM. GRANS. 0.1 (H) 0.00 - 0.04 K/UL    DF AUTOMATED     METABOLIC PANEL, BASIC    Collection Time: 03/18/22  6:36 AM   Result Value Ref Range    Sodium 141 136 - 145 mmol/L    Potassium 3.8 3.5 - 5.1 mmol/L    Chloride 111 (H) 97 - 108 mmol/L    CO2 25 21 - 32 mmol/L    Anion gap 5 5 - 15 mmol/L    Glucose 122 (H) 65 - 100 mg/dL    BUN 12 6 - 20 mg/dL    Creatinine 0.57 0.55 - 1.02 mg/dL    BUN/Creatinine ratio 21 (H) 12 - 20      GFR est AA >60 >60 ml/min/1.73m2    GFR est non-AA >60 >60 ml/min/1.73m2    Calcium 9.0 8.5 - 10.1 mg/dL   C REACTIVE PROTEIN, QT    Collection Time: 03/18/22  6:36 AM   Result Value Ref Range    C-Reactive protein 14.00 (H) 0.00 - 0.60 mg/dL   NT-PRO BNP    Collection Time: 03/18/22  6:36 AM   Result Value Ref Range    NT pro-BNP 5,489 (H) <125 pg/mL   GLUCOSE, POC    Collection Time: 03/18/22  7:29 AM   Result Value Ref Range    Glucose (POC) 121 (H) 65 - 117 mg/dL    Performed by Karla Shea    GLUCOSE, POC    Collection Time: 03/18/22 11:26 AM   Result Value Ref Range    Glucose (POC) 137 (H) 65 - 117 mg/dL    Performed by Gaurav Tang    GLUCOSE, POC    Collection Time: 03/18/22  4:57 PM   Result Value Ref Range    Glucose (POC) 151 (H) 65 - 117 mg/dL    Performed by Danni Mcmahon        chest X-ray    Assessment/     Patient Active Problem List   Diagnosis Code    DM (diabetes mellitus) ([facility] E11.9    Unspecified hypothyroidism E03.9    HTN (hypertension) I10    Fibromyalgia M79.7    Urinary incontinence R32    Vaginal vault prolapse N81.9    Rectocele N81.6    Sepsis (HCC) A41.9    UTI (urinary tract infection) N39.0           Septic shock:   suspect uti contributing/reportedly failed outpatient/recurrent uti  levophed transiently off 3/15, bp remains adequate  Ceftriaxone initiated in ED, continues, wbc descending 32K-->15K-->11k  Ucs=e coli though 20K col.   No diarrhea and cxr unrevealing  Follow blcs, ng @ 3d  Ucs=ecoli sensitive to ceftriaxone,   Switch to keflex  Judicious hydration  Monitor for urinary retention. Patient has had recurrent uti's over the past 2-3 years per , ran out of prohylaxis > 1 month ago. procal crp descending      Diabetes mellitus type 2:   Pta On small dose of Metformin and sitagliptin 100 mg daily. Resumed with nesina. Metformin held. Continue ssi/hypoglycemia protocol  Bg's well controled/24 hrs    Severe dehydration/ebonie resolving  Likely secondary to poor oral intake,   Continue judicious hydration     Toxic metabolic encephalopathy,  Poly psych rx, muscle relaxants, uti,   Significant neuropathy secondary to history of back problems with surgeries, on multiple medications which we will continue as condition dictates     Hypothyroidism:   Continues synthroid,  tsh wnl     Benign essential hypertension: On propranolol and Cozaar, held in lieu of hypotension/shock. restart when condition dictates. Will resumed proranolol and titrate to pta dose,  Resume cozar and follow. Hypomag:   Repleted and follow       Full code  Vtep: lovenox  Dispo: pending course, mentation much improved,          Care Plan discussed with: Patient/Family and Nurse    Total time spent with patient: 30 minutes. This dictation was done by dragon, computer voice recognition software. Often unanticipated grammatical, syntax, phones and other interpretive errors are inadvertently transcribed. Please excuse errors that have escaped final proofreading.     Don Shanks MD

## 2022-03-18 NOTE — PROGRESS NOTES
Problem: Falls - Risk of  Goal: *Absence of Falls  Description: Document Genie Wakefield Fall Risk and appropriate interventions in the flowsheet. 3/18/2022 0513 by Roxanne Porras RN  Outcome: Progressing Towards Goal  Note: Fall Risk Interventions:  Mobility Interventions: Bed/chair exit alarm    Mentation Interventions: Adequate sleep, hydration, pain control,Bed/chair exit alarm    Medication Interventions: Bed/chair exit alarm    Elimination Interventions: Bed/chair exit alarm,Call light in reach    History of Falls Interventions: Bed/chair exit alarm      3/18/2022 0513 by Roxanne Porras RN  Outcome: Progressing Towards Goal  Note: Fall Risk Interventions:  Mobility Interventions: Bed/chair exit alarm    Mentation Interventions: Adequate sleep, hydration, pain control,Bed/chair exit alarm    Medication Interventions: Bed/chair exit alarm    Elimination Interventions: Bed/chair exit alarm,Call light in reach    History of Falls Interventions: Bed/chair exit alarm         Problem: Patient Education: Go to Patient Education Activity  Goal: Patient/Family Education  Outcome: Progressing Towards Goal     Problem: Pressure Injury - Risk of  Goal: *Prevention of pressure injury  Description: Document Amanuel Scale and appropriate interventions in the flowsheet.   3/18/2022 0513 by Roxanne Porras RN  Outcome: Progressing Towards Goal  Note: Pressure Injury Interventions:  Sensory Interventions: Assess changes in LOC,Assess need for specialty bed,Keep linens dry and wrinkle-free    Moisture Interventions: Absorbent underpads,Apply protective barrier, creams and emollients,Assess need for specialty bed,Check for incontinence Q2 hours and as needed,Internal/External urinary devices    Activity Interventions: Pressure redistribution bed/mattress(bed type)    Mobility Interventions: Assess need for specialty bed,HOB 30 degrees or less    Nutrition Interventions: Discuss nutritional consult with provider    Friction and Shear Interventions: Apply protective barrier, creams and emollients,HOB 30 degrees or less             3/18/2022 0513 by Doug Dominguez RN  Outcome: Progressing Towards Goal  Note: Pressure Injury Interventions:  Sensory Interventions: Assess changes in LOC,Assess need for specialty bed,Keep linens dry and wrinkle-free    Moisture Interventions: Absorbent underpads,Apply protective barrier, creams and emollients,Assess need for specialty bed,Check for incontinence Q2 hours and as needed,Internal/External urinary devices    Activity Interventions: Pressure redistribution bed/mattress(bed type)    Mobility Interventions: Assess need for specialty bed,HOB 30 degrees or less    Nutrition Interventions: Discuss nutritional consult with provider    Friction and Shear Interventions: Apply protective barrier, creams and emollients,HOB 30 degrees or less                Problem: Patient Education: Go to Patient Education Activity  Goal: Patient/Family Education  Outcome: Progressing Towards Goal     Problem: Patient Education: Go to Patient Education Activity  Goal: Patient/Family Education  Outcome: Progressing Towards Goal

## 2022-03-19 PROBLEM — N39.0 UTI (URINARY TRACT INFECTION): Status: ACTIVE | Noted: 2022-03-14

## 2022-03-19 PROBLEM — A41.9 SEPSIS (HCC): Status: ACTIVE | Noted: 2022-03-14

## 2022-03-19 LAB
GLUCOSE BLD STRIP.AUTO-MCNC: 121 MG/DL (ref 65–117)
GLUCOSE BLD STRIP.AUTO-MCNC: 121 MG/DL (ref 65–117)
GLUCOSE BLD STRIP.AUTO-MCNC: 145 MG/DL (ref 65–117)
GLUCOSE BLD STRIP.AUTO-MCNC: 160 MG/DL (ref 65–117)
GLUCOSE BLD STRIP.AUTO-MCNC: 163 MG/DL (ref 65–117)
PERFORMED BY, TECHID: ABNORMAL

## 2022-03-19 PROCEDURE — 97161 PT EVAL LOW COMPLEX 20 MIN: CPT

## 2022-03-19 PROCEDURE — 97530 THERAPEUTIC ACTIVITIES: CPT

## 2022-03-19 PROCEDURE — 74011250637 HC RX REV CODE- 250/637: Performed by: INTERNAL MEDICINE

## 2022-03-19 PROCEDURE — 97165 OT EVAL LOW COMPLEX 30 MIN: CPT

## 2022-03-19 PROCEDURE — 74011250636 HC RX REV CODE- 250/636: Performed by: HOSPITALIST

## 2022-03-19 PROCEDURE — 74011250637 HC RX REV CODE- 250/637: Performed by: HOSPITALIST

## 2022-03-19 PROCEDURE — 74011636637 HC RX REV CODE- 636/637: Performed by: HOSPITALIST

## 2022-03-19 PROCEDURE — 65270000029 HC RM PRIVATE

## 2022-03-19 PROCEDURE — 82962 GLUCOSE BLOOD TEST: CPT

## 2022-03-19 PROCEDURE — 74011000250 HC RX REV CODE- 250: Performed by: HOSPITALIST

## 2022-03-19 RX ORDER — CEPHALEXIN 250 MG/1
500 CAPSULE ORAL EVERY 8 HOURS
Status: DISCONTINUED | OUTPATIENT
Start: 2022-03-19 | End: 2022-03-21 | Stop reason: HOSPADM

## 2022-03-19 RX ADMIN — Medication 1 TABLET: at 09:53

## 2022-03-19 RX ADMIN — ASPIRIN 81 MG: 81 TABLET, COATED ORAL at 09:52

## 2022-03-19 RX ADMIN — ENOXAPARIN SODIUM 40 MG: 100 INJECTION SUBCUTANEOUS at 09:52

## 2022-03-19 RX ADMIN — CEPHALEXIN 500 MG: 250 CAPSULE ORAL at 15:15

## 2022-03-19 RX ADMIN — WHITE PETROLATUM,ZINC OXIDE: 20; 75 PASTE TOPICAL at 16:00

## 2022-03-19 RX ADMIN — BACLOFEN 10 MG: 10 TABLET ORAL at 09:53

## 2022-03-19 RX ADMIN — PANCRELIPASE 1 CAPSULE: 60000; 12000; 38000 CAPSULE, DELAYED RELEASE PELLETS ORAL at 16:58

## 2022-03-19 RX ADMIN — CLONAZEPAM 0.5 MG: 1 TABLET ORAL at 21:23

## 2022-03-19 RX ADMIN — PROPRANOLOL HYDROCHLORIDE 40 MG: 10 TABLET ORAL at 16:58

## 2022-03-19 RX ADMIN — PRIMIDONE 250 MG: 250 TABLET ORAL at 16:58

## 2022-03-19 RX ADMIN — CLONAZEPAM 0.5 MG: 1 TABLET ORAL at 09:53

## 2022-03-19 RX ADMIN — GABAPENTIN 300 MG: 300 CAPSULE ORAL at 09:53

## 2022-03-19 RX ADMIN — WHITE PETROLATUM,ZINC OXIDE: 20; 75 PASTE TOPICAL at 09:00

## 2022-03-19 RX ADMIN — LURASIDONE HYDROCHLORIDE 60 MG: 20 TABLET, FILM COATED ORAL at 21:31

## 2022-03-19 RX ADMIN — GABAPENTIN 300 MG: 300 CAPSULE ORAL at 16:58

## 2022-03-19 RX ADMIN — BACLOFEN 10 MG: 10 TABLET ORAL at 21:23

## 2022-03-19 RX ADMIN — INSULIN LISPRO 3 UNITS: 100 INJECTION, SOLUTION INTRAVENOUS; SUBCUTANEOUS at 16:58

## 2022-03-19 RX ADMIN — RISPERIDONE 4 MG: 2 TABLET, FILM COATED ORAL at 21:23

## 2022-03-19 RX ADMIN — WHITE PETROLATUM,ZINC OXIDE: 20; 75 PASTE TOPICAL at 22:00

## 2022-03-19 RX ADMIN — CLONAZEPAM 0.5 MG: 1 TABLET ORAL at 16:58

## 2022-03-19 RX ADMIN — GABAPENTIN 300 MG: 300 CAPSULE ORAL at 21:23

## 2022-03-19 RX ADMIN — Medication 1000 MCG: at 09:53

## 2022-03-19 RX ADMIN — VENLAFAXINE HYDROCHLORIDE 75 MG: 75 CAPSULE, EXTENDED RELEASE ORAL at 21:23

## 2022-03-19 RX ADMIN — MAGNESIUM OXIDE TAB 400 MG (241.3 MG ELEMENTAL MG) 400 MG: 400 (241.3 MG) TAB at 09:53

## 2022-03-19 RX ADMIN — SODIUM CHLORIDE, PRESERVATIVE FREE 10 ML: 5 INJECTION INTRAVENOUS at 21:24

## 2022-03-19 RX ADMIN — MELATONIN TAB 3 MG 3 MG: 3 TAB at 21:23

## 2022-03-19 RX ADMIN — WATER 1 G: 1 INJECTION INTRAMUSCULAR; INTRAVENOUS; SUBCUTANEOUS at 09:54

## 2022-03-19 RX ADMIN — MAGNESIUM OXIDE TAB 400 MG (241.3 MG ELEMENTAL MG) 400 MG: 400 (241.3 MG) TAB at 21:23

## 2022-03-19 RX ADMIN — LEVOTHYROXINE SODIUM 100 MCG: 0.1 TABLET ORAL at 09:53

## 2022-03-19 RX ADMIN — ALOGLIPTIN 25 MG: 25 TABLET, FILM COATED ORAL at 09:52

## 2022-03-19 RX ADMIN — PRIMIDONE 250 MG: 250 TABLET ORAL at 09:52

## 2022-03-19 RX ADMIN — SODIUM CHLORIDE, PRESERVATIVE FREE 10 ML: 5 INJECTION INTRAVENOUS at 00:07

## 2022-03-19 RX ADMIN — PROPRANOLOL HYDROCHLORIDE 40 MG: 10 TABLET ORAL at 21:23

## 2022-03-19 RX ADMIN — LOSARTAN POTASSIUM 25 MG: 25 TABLET, FILM COATED ORAL at 09:52

## 2022-03-19 RX ADMIN — CEPHALEXIN 500 MG: 250 CAPSULE ORAL at 21:23

## 2022-03-19 RX ADMIN — SODIUM CHLORIDE, PRESERVATIVE FREE 10 ML: 5 INJECTION INTRAVENOUS at 16:58

## 2022-03-19 RX ADMIN — PRIMIDONE 250 MG: 250 TABLET ORAL at 21:23

## 2022-03-19 RX ADMIN — Medication 2000 UNITS: at 09:53

## 2022-03-19 RX ADMIN — PANCRELIPASE 1 CAPSULE: 60000; 12000; 38000 CAPSULE, DELAYED RELEASE PELLETS ORAL at 10:01

## 2022-03-19 RX ADMIN — VENLAFAXINE HYDROCHLORIDE 75 MG: 75 CAPSULE, EXTENDED RELEASE ORAL at 09:53

## 2022-03-19 NOTE — PROGRESS NOTES
Problem: Self Care Deficits Care Plan (Adult)  Goal: *Acute Goals and Plan of Care (Insert Text)  Description: Pt will be SBA sup<->sit in prep for EOB ADL's  Pt will be min A  LB dressing EOB level  Pt will be SBA  sit EOB 10 minutes in prep for EOB ADL's  Pt will be SBA  grooming EOB level  Pt will be min A  sit<-> prep for toilet transfer  Pt will be min A  BSC progress to stnadard toilet transfer with LRAD  Pt will be min A  toileting/cloth mgmt LRAD  Pt will be min A bathing sitting/standing sink/chair level LRAD  Pt will be MI kenneth UE HEP in prep for self care tasks    Outcome: Not Met     OCCUPATIONAL THERAPY EVALUATION  Patient: Fito Olmedo (62 y.o. female)  Date: 3/19/2022  Primary Diagnosis: Sepsis ([facility] [address] [A41.9]  UTI (urinary tract infection) [N39.0]        Precautions: falls       ASSESSMENT  Patient is 77 y/o female came to [facility] with weakness, AMS, being treated for UTI and adm 3/14/2022 for AMS, fever, UTI without hematuria, sepsis with hypotension and shock, severe dehydration. Pt has hx of DM, HTN, fibromyalgia, sleep apnea, arthritis, GERD, hypothyroid, migraine, morbid obesity, anxiety, depression, sleep apnea with CPAP use, urinary incontinence, appendectomjy, left breast lumpectomy (2011), cholecystectomy, spine surgery x4,   being treated for UTI. Patient received seated EOB with PT, A&O to self, orineted to being in hospital but believes she is in [place] and orineted to month not year (stating 2020) and agreeable for OT/PT eval/tx. Per pt report, pt lives with spouse and requires assist for all self care and transfers however pt initially stating she ambulates independently however with further questioning pt stating having BSC, walker and rollator. Per chart review, pt lives with spouse in one story home and requires assist for all self care (spouse assists) and pt does not have any DME in home (may need further clarrification on PLOF and home information).      Pt currently presents with generalized weakness (LUE grossly 3+/5, RUE grossly 2-/5), decreased balance, decreased activity tolerance, decreased safety awareness, decreased command following (pt requiring cueing and increased time to follow commands while semi supine in bed) and increased need for assist with self care (SBA simple grooming with increased time, total A toileting/cloth mgmt simulated bed level, total A LB dressing simulated semi supine in bed, supervision self feeding with set up using LUE) and functional transfers/mobility (max Ax2 sit<->stand, max Ax2 sit->sup and scooting bed level). Pt would benefit from continued skilled OT services while at [facility] in order to increase safety and independence with self care and functional transfers/mobility. D/C recommendation TBD between SNF vs HHOT pending PLOF information as pt providing varying PLOF information today. Other factors to consider for discharge: time since onset, severity of deficits, PLOF unknown        PLAN :  Recommendations and Planned Interventions: self care training, functional mobility training, therapeutic exercise, balance training, therapeutic activities, endurance activities, patient education, and home safety training    Frequency/Duration: Patient will be followed by occupational therapy 2-3x/week to address goals. Recommendation for discharge: (in order for the patient to meet his/her long term goals)  To Be Determined SNF vs HHOT pending PLOF information    This discharge recommendation:  Has been made in collaboration with the attending provider and/or case management    IF patient discharges home will need the following DME: TBD       SUBJECTIVE:   Patient stated I could walk by myself I think.     OBJECTIVE DATA SUMMARY:   HISTORY:   Past Medical History:   Diagnosis Date    Arthritis     Chronic pain     back pain    Chronic pain     fibromyalgia    Diabetes ([facility] [address]     steroid induced per pt    Fibromyalgia     chronic pain    GERD (gastroesophageal reflux disease)     Hypercholesteremia     Hypertension     Hypothyroid     hypothyroid    Migraine     Morbid obesity (HCC)     Other ill-defined conditions(799.89)     extreme dry mouth (due to meds pt stated)    Pernicious anemia     Psychiatric disorder     anxiety and depression    Unspecified sleep apnea     cpap    Urinary incontinence      Past Surgical History:   Procedure Laterality Date    HX APPENDECTOMY      HX BREAST LUMPECTOMY  2011    left    HX CHOLECYSTECTOMY      HX ORTHOPAEDIC      x4 spine surgeries    HX ORTHOPAEDIC  5/28/13    L2-5 DECOMPRESSION AND FUSION POSTERIOR MULTIILEVEL    HX OTHER SURGICAL      laparoscopy - not sure why    HX TOTAL ABDOMINAL HYSTERECTOMY  1981    menorrhagia, dysmenorrhea;  thinks she may have had one ovary removed.  HX UROLOGICAL      surgery for incontinence    HX UROLOGICAL  10/10/14    Urodynamics       Expanded or extensive additional review of patient history:     Home Situation  Home Environment: Private residence  One/Two Story Residence: One story  Living Alone: No  Support Systems: Spouse/Significant Other  Patient Expects to be Discharged to[de-identified]  (TBD)  Current DME Used/Available at Home: Blood pressure cuff,Glucometer,Walker, rollator    PLOF: Per pt report, pt lives with spouse and requires assist for all self care and transfers however pt initially stating she ambulates independently however with further questioning pt stating having BSC, walker and rollator. Per chart review, pt lives with spouse in one story home and requires assist for all self care (spouse assists) and pt does not have any DME in home (may need further clarrification on PLOF and home information).      Hand dominance: Right    EXAMINATION OF PERFORMANCE DEFICITS:  Cognitive/Behavioral Status:  Neurologic State: Alert  Orientation Level: Oriented to person (oriented to being in hospital and march)  Cognition: Decreased attention/concentration;Decreased command following;Poor safety awareness     Hearing: Auditory  Auditory Impairment: None    Range of Motion:  AROM: Generally decreased, functional (RUE grossly decreased nonfunctional)  PROM: Generally decreased, functional     Strength:  Strength: Generally decreased, functional (LUE 3+/5, RUE 2-/5)     Balance:  Sitting: Impaired; With support  Sitting - Static: Fair (occasional)  Sitting - Dynamic: Poor (constant support)  Standing: Impaired;Pull to stand; With support  Standing - Static: Constant support;Poor  Standing - Dynamic : Constant support;Poor (unable to take any steps at this time)    Functional Mobility and Transfers for ADLs:  Bed Mobility:  Rolling: Maximum assistance;Assist x2  Sit to Supine: Maximum assistance;Assist x2  Scooting: Maximum assistance;Assist x2    Transfers:  Sit to Stand: Maximum assistance;Assist x2  Stand to Sit: Maximum assistance;Assist x2  Assistive Device : Gait Belt;Walker, rolling    ADL Assessment:  Feeding: Supervision;Setup    Oral Facial Hygiene/Grooming: Stand-by assistance; Additional time    Lower Body Dressing: Total assistance (simulated)    Toileting: Total assistance (simulated)     ADL Intervention and task modifications:  Feeding  Feeding Assistance: Set-up; Supervision  Utensil Management: Set-up; Supervision  Food to Mouth: Set-up; Supervision    Grooming  Grooming Assistance: Stand-by assistance  Position Performed:  (semi supine in bed)  Washing Face: Stand-by assistance  Washing Hands: Stand-by assistance    Lower Body Dressing Assistance  Socks: Total assistance (dependent) (simulated)  Position Performed: Supine    Toileting  Toileting Assistance: Total assistance(dependent) (simulated bed level)  Bladder Hygiene: Total assistance (dependent)  Bowel Hygiene:  Total assistance (dependent)  Clothing Management: Total assistance (dependent)    Cognitive Retraining  Orientation Retraining: Reorienting    Therapeutic Exercise:  Min A RUE HEP for 1 set of 5 reps in all planes and SBA LUE HEP for 1 set of 5 reps in all planes with cueing in prep for self care tasks     [address] [place] [address] AM-PACTM \"6 Clicks\"                                                       Daily Activity Inpatient Short Form  How much help from another person does the patient currently need. .. Total; A Lot A Little None   1. Putting on and taking off regular lower body clothing? [x]  1 []  2 []  3 []  4   2. Bathing (including washing, rinsing, drying)? []  1 [x]  2 []  3 []  4   3. Toileting, which includes using toilet, bedpan or urinal? [x] 1 []  2 []  3 []  4   4. Putting on and taking off regular upper body clothing? []  1 [x]  2 []  3 []  4   5. Taking care of personal grooming such as brushing teeth? []  1 []  2 [x]  3 []  4   6. Eating meals? []  1 []  2 [x]  3 []  4   © 2007, Trustees of [address] [place] [id number], under license to [employer]. All rights reserved     Score: 12/24     Interpretation of Tool:  Represents clinically-significant functional categories (i.e. Activities of daily living). Percentage of Impairment CH    0%   CI    1-19% CJ    20-39% CK    40-59% CL    60-79% CM    80-99% CN     100%   [facility]  Score 6-24 24 23 20-22 15-19 10-14 7-9 6        Occupational Therapy Evaluation Charge Determination   History Examination Decision-Making   LOW Complexity : Brief history review  MEDIUM Complexity : 3-5 performance deficits relating to physical, cognitive , or psychosocial skils that result in activity limitations and / or participation restrictions MEDIUM Complexity : Patient may present with comorbidities that affect occupational performnce.  Miniml to moderate modification of tasks or assistance (eg, physical or verbal ) with assesment(s) is necessary to enable patient to complete evaluation       Based on the above components, the patient evaluation is determined to be of the following complexity level: LOW   Pain Rating:  No pain reported at rest    Activity Tolerance:   Fair  Please refer to the flowsheet for vital signs taken during this treatment. After treatment patient left in no apparent distress:    Supine in bed, Call bell within reach, Bed / chair alarm activated, and Side rails x 3    COMMUNICATION/EDUCATION:   The patients plan of care was discussed with: Physical therapist and Registered nurse. PT/OT sessions occurred together for increased safety of pt and clinician. Home safety education was provided and the patient/caregiver indicated understanding. and Patient/family have participated as able in goal setting and plan of care. This patients plan of care is appropriate for delegation to hospitals.     Thank you for this referral.  Mohamud Ardon  Time Calculation: 29 mins

## 2022-03-19 NOTE — PROGRESS NOTES
RESP NOTE:  Pt is scheduled for CPAP placement at night and PRN. Went in pts room to place pt on home CPAP. Pt was on Nasal cannula without flow. Pt was hemodynamically stable. Slow to respond to voice. Sternal rub done and pt responded.   Pt currently on 2 lpm NC.

## 2022-03-19 NOTE — PROGRESS NOTES
PHYSICAL THERAPY EVALUATION  Patient: Quita Ayala (58 y.o. female)  Date: 3/19/2022  Primary Diagnosis: Sepsis ([address] [A41.9]  UTI (urinary tract infection) [N39.0]        Precautions: fall    ASSESSMENT  Patient is 77 y/o female came to [facility] with weakness, AMS, being treated for UTI and adm 3/14/2022 for AMS, fever, UTI without hematuria, sepsis with hypotension and shock, severe dehydration. Pt has hx of DM, HTN, fibromyalgia, sleep apnea, arthritis, GERD, hypothyroid, migraine, morbid obesity, anxiety, depression, sleep apnea with CPAP use, urinary incontinence, appendectomjy, left breast lumpectomy (2011), cholecystectomy, spine surgery x4,   being treated for UTI. Patient received in bed semi supine, A&O to self, orineted to being in hospital but believes she is in [place] and orineted to month not year (stating 2020) and agreeable for PT eval/tx. Per pt report, pt lives with spouse and requires assist for all self care and transfers however pt initially stating she ambulates independently however with further questioning pt stating having BSC, walker and rollator. Per chart review, pt lives with spouse in one story home and requires assist for all self care (spouse assists) and pt does not have any DME in home (may need further clarrification on PLOF and home information). Pt currently presents with generalized weakness (LLE grossly 3-/5, RLE grossly 2-/5), decreased balance, decreased activity tolerance, decreased safety awareness, decreased command following (pt requiring cueing and increased time to follow commands while semi supine in bed) and increased need for assist with  functional transfers/mobility (max Ax2 sit<->stand, max Ax2 sit->sup and scooting bed level). Pt would benefit from continued skilled PT services while at [facility] in order to increase safety and independence with self care and functional transfers/mobility.  D/C recommendation TBD between SNF vs HHPT pending PLOF information as pt providing varying PLOF information today. RN requested not to get patient up in the chair as she was getting oob this AM and not safe in the chair. Other factors to consider for discharge: time since onset, severity of deficits, PLOF unknown     PLAN :  Recommendations and Planned Interventions: bed mobility training, transfer training, gait training, therapeutic exercises, patient and family training/education, and therapeutic activities      Frequency/Duration: Patient will be followed by physical therapy:  2-3x/week to address goals. Recommendation for discharge: (in order for the patient to meet his/her long term goals)  To Be Determined    This discharge recommendation:  Has been made in collaboration with the attending provider and/or case management    IF patient discharges home will need the following DME: bedside commode, hospital bed, rolling walker, and wheelchair         SUBJECTIVE:   Patient stated I am ok.     OBJECTIVE DATA SUMMARY:   HISTORY:    Past Medical History:   Diagnosis Date    Arthritis     Chronic pain     back pain    Chronic pain     fibromyalgia    Diabetes ([address]     steroid induced per pt    Fibromyalgia     chronic pain    GERD (gastroesophageal reflux disease)     Hypercholesteremia     Hypertension     Hypothyroid     hypothyroid    Migraine     Morbid obesity ([address]     Other ill-defined conditions(799.89)     extreme dry mouth (due to meds pt stated)    Pernicious anemia     Psychiatric disorder     anxiety and depression    Unspecified sleep apnea     cpap    Urinary incontinence      Past Surgical History:   Procedure Laterality Date    HX APPENDECTOMY      HX BREAST LUMPECTOMY  2011    left    HX CHOLECYSTECTOMY      HX ORTHOPAEDIC      x4 spine surgeries    HX ORTHOPAEDIC  5/28/13    L2-5 DECOMPRESSION AND FUSION POSTERIOR MULTIILEVEL    HX OTHER SURGICAL      laparoscopy - not sure why    HX TOTAL ABDOMINAL HYSTERECTOMY  1981    menorrhagia, dysmenorrhea;  thinks she may have had one ovary removed.  HX UROLOGICAL      surgery for incontinence    HX UROLOGICAL  10/10/14    Urodynamics       Personal factors and/or comorbidities impacting plan of care:   Home Situation  Home Environment: Private residence  One/Two Story Residence: One story  Living Alone: No  Support Systems: Spouse/Significant Other  Patient Expects to be Discharged to[de-identified]  (TBD)  Current DME Used/Available at Home: Blood pressure cuff,Glucometer,Walker, rollator    EXAMINATION/PRESENTATION/DECISION MAKING:   Critical Behavior:  Neurologic State: Alert  Orientation Level: Oriented to person  Cognition: Decreased attention/concentration,Poor safety awareness     Hearing: Auditory  Auditory Impairment: None  Range Of Motion:  AROM: Generally decreased, functional           PROM: Generally decreased, functional           Strength:    Strength: Generally decreased, functional (RLE 3-/5)                    Tone & Sensation:                                  Coordination:     Vision:      Functional Mobility:  Bed Mobility:  Rolling: Maximum assistance;Assist x2  Supine to Sit: Maximum assistance;Assist x2  Sit to Supine: Maximum assistance;Assist x2  Scooting: Maximum assistance;Assist x2  Transfers:  Sit to Stand: Maximum assistance;Assist x2  Stand to Sit: Maximum assistance;Assist x2                       Balance:   Sitting: Impaired; With support  Sitting - Static: Fair (occasional)  Sitting - Dynamic: Poor (constant support)  Standing: Impaired;Pull to stand; With support  Standing - Static: Constant support;Poor  Standing - Dynamic : Poor;Constant support  Ambulation/Gait Training:                                                        Functional Measure:  [employer] AM-PAC 6 Clicks         Basic Mobility Inpatient Short Form  How much difficulty does the patient currently have. .. Unable A Lot A Little None   1.   Turning over in bed (including adjusting bedclothes, sheets and blankets)? [] 1   [x] 2   [] 3   [] 4   2. Sitting down on and standing up from a chair with arms ( e.g., wheelchair, bedside commode, etc.)   [x] 1   [] 2   [] 3   [] 4   3. Moving from lying on back to sitting on the side of the bed? [] 1   [x] 2   [] 3   [] 4          How much help from another person does the patient currently need. .. Total A Lot A Little None   4. Moving to and from a bed to a chair (including a wheelchair)? [x] 1   [] 2   [] 3   [] 4   5. Need to walk in hospital room? [x] 1   [] 2   [] 3   [] 4   6. Climbing 3-5 steps with a railing? [x] 1   [] 2   [] 3   [] 4   © , Trustees of [facility] [employer], under license to [employer]. All rights reserved     Score:  Initial:  Most Recent: X (Date:2022 )   Interpretation of Tool:  Represents activities that are increasingly more difficult (i.e. Bed mobility, Transfers, Gait). Score 24 23 22-20 19-15 14-10 9-7 6   Modifier CH CI CJ CK CL CM CN           Physical Therapy Evaluation Charge Determination   History Examination Presentation Decision-Making   LOW Complexity : Zero comorbidities / personal factors that will impact the outcome / POC LOW Complexity : 1-2 Standardized tests and measures addressing body structure, function, activity limitation and / or participation in recreation  LOW Complexity : Stable, uncomplicated  LOW Complexity : FOTO score of       Based on the above components, the patient evaluation is determined to be of the following complexity level: LOW     Pain Ratin/10 faces during mobility    Activity Tolerance:   Fair  Please refer to the flowsheet for vital signs taken during this treatment.     After treatment patient left in no apparent distress:   Supine in bed, Heels elevated for pressure relief, Call bell within reach, Bed / chair alarm activated, and Side rails x 3    Problem: Mobility Impaired (Adult and Pediatric)  Goal: *Acute Goals and Plan of Care (Insert Text)  Description: Patient will move from supine to sit and sit to supine , scoot up and down, and roll side to side in bed with minimal assistance/contact guard assist within 7 day(s). Patient will transfer from bed to chair and chair to bed with minimal assistance/contact guard assist using the least restrictive device within 7 day(s). Patient will improve static standing balance to minimal assistance within 1 week(s). Patient will ambulate 10 feet with moderate assistance with least restrictive device within 1 weeks. Outcome: Not Met     COMMUNICATION/EDUCATION:   The patients plan of care was discussed with: Occupational therapist, Registered nurse, and Case management. Fall prevention education was provided and the patient/caregiver indicated understanding., Patient/family have participated as able in goal setting and plan of care. , and Patient/family agree to work toward stated goals and plan of care.     Thank you for this referral.  Solange Luciano PT   Time Calculation: 25 mins

## 2022-03-19 NOTE — PROGRESS NOTES
Hospitalist Progress Note               Daily Progress Note: 3/19/2022  Chief Complaint : Altered mental status  Per HPI: 78 y.o. female with a history of diabetes, hypothyroidism, hypertension presents to the emergency room complaining of altered mental status as per . But by the time she came to the ED she was very weak and slow and lethargic but able to wake up and give information. She has difficult time to talk due to the tiredness and dryness. Mouth is getting sticky due to dryness. States started not feeling good 2 weeks ago, treated with antibiotic but when she finished that she started having again symptoms of urinary tract infection. States its frequency and dysuria. Cannot tell if there is any hematuria. And started feeling very lethargy and tiredness. Evaluation in the emergency room found with significant leukocytosis with left shift, hypotension, temperature 102.2. Suspected urinary tract infection, urine that was collected looks very cloudy and dark. She is given fluid bolus with mild improvement in the blood pressure. Admitted to medical telemetry but noted to have hypotension which did not respond to the fluid bolus, transfer to ICU. In ICU she was very dusky with low blood pressure with complete altered mental status and unresponsive. Continued on IV fluids and started on Levophed now she is little better with open eyes and blood pressure improved.     At baseline she is needing help even for bathing due to history of multiple back surgeries. Ambulation is very limited. Subjective: The patient is seen for follow  up.    Transferred out of icu 3/17/22  Progressively improved mentation, maybe baseline   Afebrile  spo2 96% on 2 L  bp stable  Wbc improved 32K-->15K-->11k-->11K-->11.4  ucs e coli, pan sensitive though intermediate for zosyn        Problem List:  Problem List as of 3/19/2022 Date Reviewed: 3/14/2022          Codes Class Noted - Resolved    Sepsis ([facility] [address] ICD-10-CM: A41.9  ICD-9-CM: 038.9, 995.91  3/14/2022 - Present        UTI (urinary tract infection) ICD-10-CM: N39.0  ICD-9-CM: 599.0  3/14/2022 - Present        Urinary incontinence ICD-10-CM: R32  ICD-9-CM: 788.30  10/10/2014 - Present        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  ICD-9-CM: 618.00  10/10/2014 - Present        Rectocele ICD-10-CM: N81.6  ICD-9-CM: 618.04  10/10/2014 - Present        [facility]di[facility] ([facility] ICD-10-CM: E11.9  ICD-9-CM: 250.00  5/29/2013 - Present        Unspecified hypothyroidism ICD-10-CM: E03.9  ICD-9-CM: 244.9  5/29/2013 - Present        HTN (hypertension) ICD-10-CM: I10  ICD-9-CM: 401.9  5/29/2013 - Present        Fibromyalgia ICD-10-CM: M79.7  ICD-9-CM: 729.1  5/29/2013 - Present              Medications reviewed  Current Facility-Administered Medications   Medication Dose Route Frequency    zinc oxide-white petrolatum 20-75 % topical paste   Topical TID    lurasidone (LATUDA) tablet 60 mg  60 mg Oral QHS    losartan (COZAAR) tablet 25 mg  25 mg Oral DAILY    propranoloL (INDERAL) tablet 40 mg  40 mg Oral TID    magnesium oxide (MAG-OX) tablet 400 mg  400 mg Oral BID    venlafaxine-SR (EFFEXOR-XR) capsule 75 mg  75 mg Oral BID    cholecalciferol (VITAMIN D3) (1000 Units /25 mcg) tablet 2,000 Units  2,000 Units Oral DAILY    cyanocobalamin tablet 1,000 mcg  1,000 mcg Oral DAILY    levothyroxine (SYNTHROID) tablet 100 mcg  100 mcg Oral ACB    alogliptin (NESINA) tablet 25 mg  25 mg Oral DAILY    melatonin tablet 3 mg  3 mg Oral QHS    lipase-protease-amylase (CREON 12,000) capsule 1 Capsule  1 Capsule Oral TID WITH MEALS    clonazePAM (KlonoPIN) tablet 0.5 mg  0.5 mg Oral TID    aspirin delayed-release tablet 81 mg  81 mg Oral DAILY    b-complex with vitamin c tablet 1 Tablet  1 Tablet Oral DAILY    baclofen (LIORESAL) tablet 10 mg  10 mg Oral BID    [Held by provider] metFORMIN ER (GLUCOPHAGE XR) tablet 500 mg  500 mg Oral DAILY    [Held by provider] losartan (COZAAR) tablet 25 mg  25 mg Oral DAILY    gabapentin (NEURONTIN) capsule 300 mg  300 mg Oral TID    primidone (MYSOLINE) tablet 250 mg  250 mg Oral TID    risperiDONE (RisperDAL) tablet 4 mg  4 mg Oral QHS    sodium chloride (NS) flush 5-40 mL  5-40 mL IntraVENous Q8H    sodium chloride (NS) flush 5-40 mL  5-40 mL IntraVENous PRN    acetaminophen (TYLENOL) tablet 650 mg  650 mg Oral Q6H PRN    Or    acetaminophen (TYLENOL) suppository 650 mg  650 mg Rectal Q6H PRN    ondansetron (ZOFRAN ODT) tablet 4 mg  4 mg Oral Q6H PRN    Or    ondansetron (ZOFRAN) injection 4 mg  4 mg IntraVENous Q6H PRN    enoxaparin (LOVENOX) injection 40 mg  40 mg SubCUTAneous DAILY    insulin lispro (HUMALOG) injection   SubCUTAneous AC&HS    glucose chewable tablet 16 g  4 Tablet Oral PRN    dextrose 10% infusion 0-250 mL  0-250 mL IntraVENous PRN    glucagon (GLUCAGEN) injection 1 mg  1 mg IntraMUSCular PRN    cefTRIAXone (ROCEPHIN) 1 g in sterile water (preservative free) 10 mL IV syringe  1 g IntraVENous Q12H           Objective:   Physical Exam:     Visit Vitals  /67 (BP 1 Location: Right upper arm, BP Patient Position: Lying right side)   Pulse 64   Temp 98.4 °F (36.9 °C)   Resp 22   Ht 5' 3\" (1.6 m)   Wt 86.5 kg (190 lb 11.2 oz)   SpO2 97%   Breastfeeding No   BMI 33.78 kg/m²    O2 Flow Rate (L/min): 1 l/min O2 Device: CPAP mask    Temp (24hrs), Av.2 °F (36.8 °C), Min:97.9 °F (36.6 °C), Max:98.4 °F (36.9 °C)    No intake/output data recorded.  1901 -  0700  In: -   Out: 2300 [Urine:2300]    General:  Alert/mores and more interactive , cooperative, no distress, appears stated age. Head:  Normocephalic, without obvious abnormality, atraumatic. Eyes:  Conjunctivae/corneas clear. EOMs intact. Throat: Moist oral mucosa   Neck: Supple, symmetrical, trachea midline, no adenopathy, no JVD. Lungs:   Clear to auscultation bilaterally. Chest wall:  No tenderness or deformity.    Heart:  Regular rate and rhythm, S1, S2 normal, no murmur, click, rub or gallop. Abdomen:   Soft, non-tender, not distended. Bowel sounds present, No rebound or guarding. Extremities: Extremities normal, atraumatic, no cyanosis. No edema   Skin: Warm,dry   Neurologic: CNII-XII intact. No motor or sensory deficits. Data Review:       Recent Days:  Recent Labs     03/18/22  0636 03/17/22  0441   WBC 11.4* 10.9   HGB 10.4* 10.1*   HCT 32.6* 31.4*    299     Recent Labs     03/18/22  0636 03/17/22  0441    141   K 3.8 3.8   * 115*   CO2 25 20*   * 140*   BUN 12 13   CREA 0.57 0.54*   CA 9.0 8.8     No results for input(s): PH, PCO2, PO2, HCO3, FIO2 in the last 72 hours.     24 Hour Results:  Recent Results (from the past 24 hour(s))   GLUCOSE, POC    Collection Time: 03/18/22 11:26 AM   Result Value Ref Range    Glucose (POC) 137 (H) 65 - 117 mg/dL    Performed by Gaurav Tang    GLUCOSE, POC    Collection Time: 03/18/22  4:57 PM   Result Value Ref Range    Glucose (POC) 151 (H) 65 - 117 mg/dL    Performed by Kaity Zacariash    GLUCOSE, POC    Collection Time: 03/18/22  7:28 PM   Result Value Ref Range    Glucose (POC) 163 (H) 65 - 117 mg/dL    Performed by Kaity [facility]    GLUCOSE, POC    Collection Time: 03/19/22  2:42 AM   Result Value Ref Range    Glucose (POC) 121 (H) 65 - 117 mg/dL    Performed by Trinidad Jimenes        chest X-ray    Assessment/     Patient Active Problem List   Diagnosis Code    DM (diabetes mellitus) ([facility] [address] E11.9    Unspecified hypothyroidism E03.9    HTN (hypertension) I10    Fibromyalgia M79.7    Urinary incontinence R32    Vaginal vault prolapse N81.9    Rectocele N81.6    Sepsis (HCC) A41.9    UTI (urinary tract infection) N39.0           Septic shock: resolving  Ecoli uti  levophed transiently off 3/15, bp remains adequate  Ceftriaxone initiated in ED, continues, wbc descending 32K-->15K-->11k  Ucs=e coli  No diarrhea and cxr unrevealing  Follow blcs, ng @ 3d  Ucs=ecoli sensitive to ceftriaxone,   Switch to keflex  Patient has had recurrent uti's over the past 2-3 years per , ran out of prohylaxis > 1 month ago. procal crp descending    Diabetes mellitus type 2:   Pta On small dose of Metformin and sitagliptin 100 mg daily at home   Resumed with nesina. Metformin held. Continue ssi/hypoglycemia protocol  Bg's well controled/24 hrs    Severe dehydration/ebonie resolving  Likely secondary to poor oral intake,   Continue judicious hydration     Toxic metabolic encephalopathy,  Poly psych rx, muscle relaxants, uti,   Significant neuropathy secondary to history of back problems with surgeries, on multiple medications which we will continue as condition dictates     Hypothyroidism:   Continues synthroid,  tsh wnl     Benign essential hypertension: On propranolol and Cozaar, were held in lieu of hypotension/shock. restarted ant titrating to pta doses    Hypomag:   Repleted and follow       Full code  Vtep: lovenox  Dispo: pending course, mentation much improved,          Care Plan discussed with: Patient/Family and Nurse    Total time spent with patient: 30 minutes. This dictation was done by dragon, computer voice recognition software. Often unanticipated grammatical, syntax, phones and other interpretive errors are inadvertently transcribed. Please excuse errors that have escaped final proofreading.     Chalo Garcia MD

## 2022-03-20 LAB
ANION GAP SERPL CALC-SCNC: 4 MMOL/L (ref 5–15)
BASOPHILS # BLD: 0.1 K/UL (ref 0–0.1)
BASOPHILS NFR BLD: 1 % (ref 0–1)
BNP SERPL-MCNC: 853 PG/ML
BUN SERPL-MCNC: 18 MG/DL (ref 6–20)
BUN/CREAT SERPL: 32 (ref 12–20)
CA-I BLD-MCNC: 8.8 MG/DL (ref 8.5–10.1)
CHLORIDE SERPL-SCNC: 108 MMOL/L (ref 97–108)
CO2 SERPL-SCNC: 28 MMOL/L (ref 21–32)
CREAT SERPL-MCNC: 0.57 MG/DL (ref 0.55–1.02)
CRP SERPL-MCNC: 5.84 MG/DL (ref 0–0.6)
DIFFERENTIAL METHOD BLD: ABNORMAL
EOSINOPHIL # BLD: 0.4 K/UL (ref 0–0.4)
EOSINOPHIL NFR BLD: 4 % (ref 0–7)
ERYTHROCYTE [DISTWIDTH] IN BLOOD BY AUTOMATED COUNT: 14.4 % (ref 11.5–14.5)
GLUCOSE BLD STRIP.AUTO-MCNC: 117 MG/DL (ref 65–117)
GLUCOSE BLD STRIP.AUTO-MCNC: 136 MG/DL (ref 65–117)
GLUCOSE BLD STRIP.AUTO-MCNC: 138 MG/DL (ref 65–117)
GLUCOSE BLD STRIP.AUTO-MCNC: 142 MG/DL (ref 65–117)
GLUCOSE SERPL-MCNC: 114 MG/DL (ref 65–100)
HCT VFR BLD AUTO: 33.5 % (ref 35–47)
HGB BLD-MCNC: 10.7 G/DL (ref 11.5–16)
IMM GRANULOCYTES # BLD AUTO: 0.3 K/UL (ref 0–0.04)
IMM GRANULOCYTES NFR BLD AUTO: 2 % (ref 0–0.5)
LYMPHOCYTES # BLD: 2.9 K/UL (ref 0.8–3.5)
LYMPHOCYTES NFR BLD: 28 % (ref 12–49)
MCH RBC QN AUTO: 29.6 PG (ref 26–34)
MCHC RBC AUTO-ENTMCNC: 31.9 G/DL (ref 30–36.5)
MCV RBC AUTO: 92.8 FL (ref 80–99)
MONOCYTES # BLD: 1.1 K/UL (ref 0–1)
MONOCYTES NFR BLD: 10 % (ref 5–13)
NEUTS SEG # BLD: 5.6 K/UL (ref 1.8–8)
NEUTS SEG NFR BLD: 55 % (ref 32–75)
NRBC # BLD: 0 K/UL (ref 0–0.01)
NRBC BLD-RTO: 0 PER 100 WBC
PERFORMED BY, TECHID: ABNORMAL
PERFORMED BY, TECHID: NORMAL
PLATELET # BLD AUTO: 342 K/UL (ref 150–400)
PMV BLD AUTO: 10.7 FL (ref 8.9–12.9)
POTASSIUM SERPL-SCNC: 3.5 MMOL/L (ref 3.5–5.1)
PROCALCITONIN SERPL-MCNC: 1.04 NG/ML
RBC # BLD AUTO: 3.61 M/UL (ref 3.8–5.2)
SODIUM SERPL-SCNC: 140 MMOL/L (ref 136–145)
WBC # BLD AUTO: 10.3 K/UL (ref 3.6–11)

## 2022-03-20 PROCEDURE — 77010033678 HC OXYGEN DAILY

## 2022-03-20 PROCEDURE — 74011250637 HC RX REV CODE- 250/637: Performed by: HOSPITALIST

## 2022-03-20 PROCEDURE — 74011250637 HC RX REV CODE- 250/637: Performed by: INTERNAL MEDICINE

## 2022-03-20 PROCEDURE — 86140 C-REACTIVE PROTEIN: CPT

## 2022-03-20 PROCEDURE — 94760 N-INVAS EAR/PLS OXIMETRY 1: CPT

## 2022-03-20 PROCEDURE — 36415 COLL VENOUS BLD VENIPUNCTURE: CPT

## 2022-03-20 PROCEDURE — 83880 ASSAY OF NATRIURETIC PEPTIDE: CPT

## 2022-03-20 PROCEDURE — 82962 GLUCOSE BLOOD TEST: CPT

## 2022-03-20 PROCEDURE — 65270000029 HC RM PRIVATE

## 2022-03-20 PROCEDURE — 74011000250 HC RX REV CODE- 250: Performed by: HOSPITALIST

## 2022-03-20 PROCEDURE — 84145 PROCALCITONIN (PCT): CPT

## 2022-03-20 PROCEDURE — 74011250636 HC RX REV CODE- 250/636: Performed by: HOSPITALIST

## 2022-03-20 PROCEDURE — 85025 COMPLETE CBC W/AUTO DIFF WBC: CPT

## 2022-03-20 PROCEDURE — 80048 BASIC METABOLIC PNL TOTAL CA: CPT

## 2022-03-20 RX ADMIN — PROPRANOLOL HYDROCHLORIDE 60 MG: 20 TABLET ORAL at 17:43

## 2022-03-20 RX ADMIN — LOSARTAN POTASSIUM 25 MG: 25 TABLET, FILM COATED ORAL at 10:30

## 2022-03-20 RX ADMIN — PRIMIDONE 250 MG: 250 TABLET ORAL at 10:30

## 2022-03-20 RX ADMIN — PANCRELIPASE 1 CAPSULE: 60000; 12000; 38000 CAPSULE, DELAYED RELEASE PELLETS ORAL at 17:41

## 2022-03-20 RX ADMIN — CEPHALEXIN 500 MG: 250 CAPSULE ORAL at 06:02

## 2022-03-20 RX ADMIN — LURASIDONE HYDROCHLORIDE 60 MG: 20 TABLET, FILM COATED ORAL at 21:32

## 2022-03-20 RX ADMIN — VENLAFAXINE HYDROCHLORIDE 75 MG: 75 CAPSULE, EXTENDED RELEASE ORAL at 10:30

## 2022-03-20 RX ADMIN — Medication 2000 UNITS: at 10:30

## 2022-03-20 RX ADMIN — WHITE PETROLATUM,ZINC OXIDE: 20; 75 PASTE TOPICAL at 16:00

## 2022-03-20 RX ADMIN — ALOGLIPTIN 25 MG: 25 TABLET, FILM COATED ORAL at 10:30

## 2022-03-20 RX ADMIN — CEPHALEXIN 500 MG: 250 CAPSULE ORAL at 21:35

## 2022-03-20 RX ADMIN — Medication 1 TABLET: at 10:30

## 2022-03-20 RX ADMIN — BACLOFEN 10 MG: 10 TABLET ORAL at 21:35

## 2022-03-20 RX ADMIN — WHITE PETROLATUM,ZINC OXIDE: 20; 75 PASTE TOPICAL at 22:00

## 2022-03-20 RX ADMIN — BACLOFEN 10 MG: 10 TABLET ORAL at 10:30

## 2022-03-20 RX ADMIN — ENOXAPARIN SODIUM 40 MG: 100 INJECTION SUBCUTANEOUS at 10:30

## 2022-03-20 RX ADMIN — CLONAZEPAM 0.5 MG: 1 TABLET ORAL at 17:42

## 2022-03-20 RX ADMIN — SODIUM CHLORIDE, PRESERVATIVE FREE 10 ML: 5 INJECTION INTRAVENOUS at 10:32

## 2022-03-20 RX ADMIN — ASPIRIN 81 MG: 81 TABLET, COATED ORAL at 10:30

## 2022-03-20 RX ADMIN — VENLAFAXINE HYDROCHLORIDE 75 MG: 75 CAPSULE, EXTENDED RELEASE ORAL at 21:37

## 2022-03-20 RX ADMIN — SODIUM CHLORIDE, PRESERVATIVE FREE 10 ML: 5 INJECTION INTRAVENOUS at 21:38

## 2022-03-20 RX ADMIN — CLONAZEPAM 0.5 MG: 1 TABLET ORAL at 21:36

## 2022-03-20 RX ADMIN — MAGNESIUM OXIDE TAB 400 MG (241.3 MG ELEMENTAL MG) 400 MG: 400 (241.3 MG) TAB at 21:37

## 2022-03-20 RX ADMIN — PROPRANOLOL HYDROCHLORIDE 60 MG: 20 TABLET ORAL at 21:35

## 2022-03-20 RX ADMIN — GABAPENTIN 300 MG: 300 CAPSULE ORAL at 17:42

## 2022-03-20 RX ADMIN — PANCRELIPASE 1 CAPSULE: 60000; 12000; 38000 CAPSULE, DELAYED RELEASE PELLETS ORAL at 14:17

## 2022-03-20 RX ADMIN — PRIMIDONE 250 MG: 250 TABLET ORAL at 21:33

## 2022-03-20 RX ADMIN — PRIMIDONE 250 MG: 250 TABLET ORAL at 17:41

## 2022-03-20 RX ADMIN — WHITE PETROLATUM,ZINC OXIDE: 20; 75 PASTE TOPICAL at 09:00

## 2022-03-20 RX ADMIN — GABAPENTIN 300 MG: 300 CAPSULE ORAL at 10:30

## 2022-03-20 RX ADMIN — Medication 1000 MCG: at 10:30

## 2022-03-20 RX ADMIN — CEPHALEXIN 500 MG: 250 CAPSULE ORAL at 14:17

## 2022-03-20 RX ADMIN — GABAPENTIN 300 MG: 300 CAPSULE ORAL at 21:32

## 2022-03-20 RX ADMIN — RISPERIDONE 4 MG: 2 TABLET, FILM COATED ORAL at 21:37

## 2022-03-20 RX ADMIN — PROPRANOLOL HYDROCHLORIDE 40 MG: 10 TABLET ORAL at 10:30

## 2022-03-20 RX ADMIN — SODIUM CHLORIDE, PRESERVATIVE FREE 10 ML: 5 INJECTION INTRAVENOUS at 17:49

## 2022-03-20 RX ADMIN — MELATONIN TAB 3 MG 3 MG: 3 TAB at 21:32

## 2022-03-20 RX ADMIN — CLONAZEPAM 0.5 MG: 1 TABLET ORAL at 10:30

## 2022-03-20 NOTE — PROGRESS NOTES
Hospitalist Progress Note               Daily Progress Note: 3/20/2022  Chief Complaint : Altered mental status  Per HPI: 78 y.o. female with a history of diabetes, hypothyroidism, hypertension presents to the emergency room complaining of altered mental status as per . But by the time she came to the ED she was very weak and slow and lethargic but able to wake up and give information. She has difficult time to talk due to the tiredness and dryness. Mouth is getting sticky due to dryness. States started not feeling good 2 weeks ago, treated with antibiotic but when she finished that she started having again symptoms of urinary tract infection. States its frequency and dysuria. Cannot tell if there is any hematuria. And started feeling very lethargy and tiredness. Evaluation in the emergency room found with significant leukocytosis with left shift, hypotension, temperature 102.2. Suspected urinary tract infection, urine that was collected looks very cloudy and dark. She is given fluid bolus with mild improvement in the blood pressure. Admitted to medical telemetry but noted to have hypotension which did not respond to the fluid bolus, transfer to ICU. In ICU she was very dusky with low blood pressure with complete altered mental status and unresponsive. Continued on IV fluids and started on Levophed now she is little better with open eyes and blood pressure improved.     At baseline she is needing help even for bathing due to history of multiple back surgeries. Ambulation is very limited. Subjective: The patient is seen for follow  up. Transferred out of icu 3/17/22  NAD   at bedside  Mentation near baseline  He's worried about her not able to get up and walk on her own.   We walked her in room with walker, she transferred in bed well but slow gait with walker,   She prefers no snf,  states never been to one, after wlking he feels may be ok to return home but wants pt to see again, apparently didn't participate nuch- mentation better now. Tolerating room air,  Afebrile,  bp ok.     Wbc improved 32K-->15K-->11k-->11K-->11.4  ucs e coli, pan sensitive though intermediate for zosyn, ceftriaxone initially,      Medications reviewed  Current Facility-Administered Medications   Medication Dose Route Frequency    cephALEXin (KEFLEX) capsule 500 mg  500 mg Oral Q8H    zinc oxide-white petrolatum 20-75 % topical paste   Topical TID    lurasidone (LATUDA) tablet 60 mg  60 mg Oral QHS    losartan (COZAAR) tablet 25 mg  25 mg Oral DAILY    propranoloL (INDERAL) tablet 40 mg  40 mg Oral TID    magnesium oxide (MAG-OX) tablet 400 mg  400 mg Oral BID    venlafaxine-SR (EFFEXOR-XR) capsule 75 mg  75 mg Oral BID    cholecalciferol (VITAMIN D3) (1000 Units /25 mcg) tablet 2,000 Units  2,000 Units Oral DAILY    cyanocobalamin tablet 1,000 mcg  1,000 mcg Oral DAILY    levothyroxine (SYNTHROID) tablet 100 mcg  100 mcg Oral ACB    alogliptin (NESINA) tablet 25 mg  25 mg Oral DAILY    melatonin tablet 3 mg  3 mg Oral QHS    lipase-protease-amylase (CREON 12,000) capsule 1 Capsule  1 Capsule Oral TID WITH MEALS    clonazePAM (KlonoPIN) tablet 0.5 mg  0.5 mg Oral TID    aspirin delayed-release tablet 81 mg  81 mg Oral DAILY    b-complex with vitamin c tablet 1 Tablet  1 Tablet Oral DAILY    baclofen (LIORESAL) tablet 10 mg  10 mg Oral BID    [Held by provider] metFORMIN ER (GLUCOPHAGE XR) tablet 500 mg  500 mg Oral DAILY    gabapentin (NEURONTIN) capsule 300 mg  300 mg Oral TID    primidone (MYSOLINE) tablet 250 mg  250 mg Oral TID    risperiDONE (RisperDAL) tablet 4 mg  4 mg Oral QHS    sodium chloride (NS) flush 5-40 mL  5-40 mL IntraVENous Q8H    sodium chloride (NS) flush 5-40 mL  5-40 mL IntraVENous PRN    acetaminophen (TYLENOL) tablet 650 mg  650 mg Oral Q6H PRN    Or    acetaminophen (TYLENOL) suppository 650 mg  650 mg Rectal Q6H PRN    ondansetron (ZOFRAN ODT) tablet 4 mg 4 mg Oral Q6H PRN    Or    ondansetron (ZOFRAN) injection 4 mg  4 mg IntraVENous Q6H PRN    enoxaparin (LOVENOX) injection 40 mg  40 mg SubCUTAneous DAILY    insulin lispro (HUMALOG) injection   SubCUTAneous AC&HS    glucose chewable tablet 16 g  4 Tablet Oral PRN    dextrose 10% infusion 0-250 mL  0-250 mL IntraVENous PRN    glucagon (GLUCAGEN) injection 1 mg  1 mg IntraMUSCular PRN           Objective:   Physical Exam:     Visit Vitals  BP (!) 151/84 (BP 1 Location: Left lower arm, BP Patient Position: Supine; At rest)   Pulse 83   Temp 98.5 °F (36.9 °C)   Resp 18   Ht 5' 3\" (1.6 m)   Wt 86.2 kg (190 lb 0.6 oz)   SpO2 (!) 89%   Breastfeeding No   BMI 33.66 kg/m²    O2 Flow Rate (L/min): 0 l/min O2 Device: None (Room air)    Temp (24hrs), Av.3 °F (36.8 °C), Min:97.5 °F (36.4 °C), Max:98.9 °F (37.2 °C)    No intake/output data recorded.  1901 -  0700  In: -   Out: 2550 [Urine:2550]    General:  Alert/interactive , cooperative, no distress, appears stated age. Head:  Normocephalic, without obvious abnormality, atraumatic. Eyes:  Conjunctivae/corneas clear. EOMs intact. Throat: Moist oral mucosa   Neck: Supple, symmetrical, trachea midline, no adenopathy, no JVD. Lungs:   Clear to auscultation bilaterally. Chest wall:  No tenderness or deformity. Heart:  Regular rate and rhythm, S1, S2 normal, no murmur, click, rub or gallop. Abdomen:   Soft, non-tender, not distended. Bowel sounds present, No rebound or guarding. Extremities: Extremities normal, atraumatic, no cyanosis. No edema   Skin: Warm,dry   Neurologic: CNII-XII intact. Subtle rt lower ext weak>generalized weak. Transferred self in bed, took about 25 steps in room with walker, though weak needed minimal assist though balance questionable.      Data Review:       Recent Days:  Recent Labs     22  0734 22  0636   WBC 10.3 11.4*   HGB 10.7* 10.4*   HCT 33.5* 32.6*    300     Recent Labs     22  0712 03/18/22  0636    141   K 3.5 3.8    111*   CO2 28 25   * 122*   BUN 18 12   CREA 0.57 0.57   CA 8.8 9.0     No results for input(s): PH, PCO2, PO2, HCO3, FIO2 in the last 72 hours. 24 Hour Results:  Recent Results (from the past 24 hour(s))   GLUCOSE, POC    Collection Time: 03/19/22  4:14 PM   Result Value Ref Range    Glucose (POC) 160 (H) 65 - 117 mg/dL    Performed by Venecia Estevez 429, POC    Collection Time: 03/19/22  9:13 PM   Result Value Ref Range    Glucose (POC) 163 (H) 65 - 117 mg/dL    Performed by [employer], BASIC    Collection Time: 03/20/22  7:34 AM   Result Value Ref Range    Sodium 140 136 - 145 mmol/L    Potassium 3.5 3.5 - 5.1 mmol/L    Chloride 108 97 - 108 mmol/L    CO2 28 21 - 32 mmol/L    Anion gap 4 (L) 5 - 15 mmol/L    Glucose 114 (H) 65 - 100 mg/dL    BUN 18 6 - 20 mg/dL    Creatinine 0.57 0.55 - 1.02 mg/dL    BUN/Creatinine ratio 32 (H) 12 - 20      GFR est AA >60 >60 ml/min/1.73m2    GFR est non-AA >60 >60 ml/min/1.73m2    Calcium 8.8 8.5 - 10.1 mg/dL   CBC WITH AUTOMATED DIFF    Collection Time: 03/20/22  7:34 AM   Result Value Ref Range    WBC 10.3 3.6 - 11.0 K/uL    RBC 3.61 (L) 3.80 - 5.20 M/uL    HGB 10.7 (L) 11.5 - 16.0 g/dL    HCT 33.5 (L) 35.0 - 47.0 %    MCV 92.8 80.0 - 99.0 FL    MCH 29.6 26.0 - 34.0 PG    MCHC 31.9 30.0 - 36.5 g/dL    RDW 14.4 11.5 - 14.5 %    PLATELET 502 943 - 928 K/uL    MPV 10.7 8.9 - 12.9 FL    NRBC 0.0 0.0  WBC    ABSOLUTE NRBC 0.00 0.00 - 0.01 K/uL    NEUTROPHILS 55 32 - 75 %    LYMPHOCYTES 28 12 - 49 %    MONOCYTES 10 5 - 13 %    EOSINOPHILS 4 0 - 7 %    BASOPHILS 1 0 - 1 %    IMMATURE GRANULOCYTES 2 (H) 0 - 0.5 %    ABS. NEUTROPHILS 5.6 1.8 - 8.0 K/UL    ABS. LYMPHOCYTES 2.9 0.8 - 3.5 K/UL    ABS. MONOCYTES 1.1 (H) 0.0 - 1.0 K/UL    ABS. EOSINOPHILS 0.4 0.0 - 0.4 K/UL    ABS. BASOPHILS 0.1 0.0 - 0.1 K/UL    ABS. IMM.  GRANS. 0.3 (H) 0.00 - 0.04 K/UL    DF AUTOMATED     PROCALCITONIN Collection Time: 03/20/22  7:34 AM   Result Value Ref Range    Procalcitonin 1.04 (H) 0 ng/mL   C REACTIVE PROTEIN, QT    Collection Time: 03/20/22  7:34 AM   Result Value Ref Range    C-Reactive protein 5.84 (H) 0.00 - 0.60 mg/dL   NT-PRO BNP    Collection Time: 03/20/22  7:34 AM   Result Value Ref Range    NT pro- (H) <125 pg/mL   GLUCOSE, POC    Collection Time: 03/20/22  9:42 AM   Result Value Ref Range    Glucose (POC) 142 (H) 65 - 117 mg/dL    Performed by Kamran Roth    GLUCOSE, POC    Collection Time: 03/20/22 12:50 PM   Result Value Ref Range    Glucose (POC) 117 65 - 117 mg/dL    Performed by Kamran Roth        chest X-ray    Assessment/         Septic shock: resolved  Ecoli uti  levophed transiently off 3/15, bp remains adequate  Ceftriaxone initiated in ED, continues, wbc descending 32K-->15K-->11k  Ucs=e coli  No diarrhea and cxr unrevealing  Follow blcs, ng @ 3d  Ucs=ecoli sensitive to ceftriaxone,   Switched to keflex, previously on this for uti supression,  Patient has had recurrent uti's over the past 2-3 years per , ran out of prohylaxis > 1 month ago. procal crp descending    Diabetes mellitus type 2:   Pta On small dose of Metformin and sitagliptin 100 mg daily at home   Resumed with nesina. Metformin held. Continue ssi/hypoglycemia protocol  Bg's well controled/24 hrs    Severe dehydration/ebonie resolved  Likely secondary to poor oral intake,   Continue judicious hydration     Toxic metabolic encephalopathy,  Poly psych rx, muscle relaxants, uti,   Significant neuropathy secondary to history of back problems with surgeries, on multiple medications which we will continue as condition dictates     Hypothyroidism:   Continues synthroid,  tsh wnl     Benign essential hypertension: On propranolol and Cozaar, were held in lieu of hypotension/shock. restarted ant titrating to pta doses    Hypomag:   Repleted and follow       Full code  Vtep: lovenox  Dispo:  Anticipate home tomorrow. Prefers [facility]hc/ot/ot         Care Plan discussed with: Patient/Family and Nurse    Total time spent with patient: 30 minutes. This dictation was done by dragon, computer voice recognition software. Often unanticipated grammatical, syntax, phones and other interpretive errors are inadvertently transcribed. Please excuse errors that have escaped final proofreading.     Valentino Moreno MD

## 2022-03-21 VITALS
DIASTOLIC BLOOD PRESSURE: 70 MMHG | SYSTOLIC BLOOD PRESSURE: 128 MMHG | BODY MASS INDEX: 33.87 KG/M2 | OXYGEN SATURATION: 93 % | TEMPERATURE: 98.4 F | RESPIRATION RATE: 18 BRPM | HEIGHT: 63 IN | HEART RATE: 84 BPM | WEIGHT: 191.14 LBS

## 2022-03-21 PROBLEM — E86.0 DEHYDRATION: Status: ACTIVE | Noted: 2022-03-21

## 2022-03-21 PROBLEM — R65.21 SEPTIC SHOCK (HCC): Status: ACTIVE | Noted: 2022-03-14

## 2022-03-21 PROBLEM — G93.41 METABOLIC ENCEPHALOPATHY: Status: RESOLVED | Noted: 2022-03-21 | Resolved: 2022-03-21

## 2022-03-21 PROBLEM — N17.9 AKI (ACUTE KIDNEY INJURY) (HCC): Status: ACTIVE | Noted: 2022-03-21

## 2022-03-21 PROBLEM — A41.9 SEPTIC SHOCK (HCC): Status: RESOLVED | Noted: 2022-03-14 | Resolved: 2022-03-21

## 2022-03-21 PROBLEM — E86.0 DEHYDRATION: Status: RESOLVED | Noted: 2022-03-21 | Resolved: 2022-03-21

## 2022-03-21 PROBLEM — B96.20 E. COLI UTI (URINARY TRACT INFECTION): Status: ACTIVE | Noted: 2022-03-14

## 2022-03-21 PROBLEM — R65.21 SEPTIC SHOCK (HCC): Status: RESOLVED | Noted: 2022-03-14 | Resolved: 2022-03-21

## 2022-03-21 PROBLEM — N17.9 AKI (ACUTE KIDNEY INJURY) (HCC): Status: RESOLVED | Noted: 2022-03-21 | Resolved: 2022-03-21

## 2022-03-21 PROBLEM — G93.41 METABOLIC ENCEPHALOPATHY: Status: ACTIVE | Noted: 2022-03-21

## 2022-03-21 LAB
ANION GAP SERPL CALC-SCNC: 3 MMOL/L (ref 5–15)
BACTERIA SPEC CULT: NORMAL
BUN SERPL-MCNC: 15 MG/DL (ref 6–20)
BUN/CREAT SERPL: 28 (ref 12–20)
CA-I BLD-MCNC: 9.2 MG/DL (ref 8.5–10.1)
CHLORIDE SERPL-SCNC: 109 MMOL/L (ref 97–108)
CO2 SERPL-SCNC: 28 MMOL/L (ref 21–32)
CREAT SERPL-MCNC: 0.54 MG/DL (ref 0.55–1.02)
GLUCOSE BLD STRIP.AUTO-MCNC: 105 MG/DL (ref 65–117)
GLUCOSE BLD STRIP.AUTO-MCNC: 116 MG/DL (ref 65–117)
GLUCOSE SERPL-MCNC: 119 MG/DL (ref 65–100)
MAGNESIUM SERPL-MCNC: 2.1 MG/DL (ref 1.6–2.4)
PERFORMED BY, TECHID: NORMAL
PERFORMED BY, TECHID: NORMAL
POTASSIUM SERPL-SCNC: 3.8 MMOL/L (ref 3.5–5.1)
SODIUM SERPL-SCNC: 140 MMOL/L (ref 136–145)
SPECIAL REQUESTS,SREQ: NORMAL

## 2022-03-21 PROCEDURE — 74011250636 HC RX REV CODE- 250/636: Performed by: HOSPITALIST

## 2022-03-21 PROCEDURE — 74011250637 HC RX REV CODE- 250/637: Performed by: INTERNAL MEDICINE

## 2022-03-21 PROCEDURE — 83735 ASSAY OF MAGNESIUM: CPT

## 2022-03-21 PROCEDURE — 82962 GLUCOSE BLOOD TEST: CPT

## 2022-03-21 PROCEDURE — 36415 COLL VENOUS BLD VENIPUNCTURE: CPT

## 2022-03-21 PROCEDURE — 74011250637 HC RX REV CODE- 250/637: Performed by: HOSPITALIST

## 2022-03-21 PROCEDURE — 80048 BASIC METABOLIC PNL TOTAL CA: CPT

## 2022-03-21 PROCEDURE — 74011000250 HC RX REV CODE- 250: Performed by: HOSPITALIST

## 2022-03-21 RX ORDER — TRAMADOL HYDROCHLORIDE 50 MG/1
50 TABLET ORAL
Qty: 10 TABLET | Refills: 0 | Status: SHIPPED
Start: 2022-03-21 | End: 2022-03-28

## 2022-03-21 RX ORDER — CEPHALEXIN 500 MG/1
500 CAPSULE ORAL 3 TIMES DAILY
Qty: 15 CAPSULE | Refills: 0 | Status: SHIPPED | OUTPATIENT
Start: 2022-03-21 | End: 2022-03-26

## 2022-03-21 RX ORDER — SAME BUTANEDISULFONATE/BETAINE 400-600 MG
250 POWDER IN PACKET (EA) ORAL 2 TIMES DAILY
Qty: 14 CAPSULE | Refills: 0 | Status: SHIPPED | OUTPATIENT
Start: 2022-03-21 | End: 2022-03-28

## 2022-03-21 RX ORDER — LANOLIN ALCOHOL/MO/W.PET/CERES
400 CREAM (GRAM) TOPICAL 2 TIMES DAILY
Qty: 30 TABLET | Refills: 0 | Status: SHIPPED | OUTPATIENT
Start: 2022-03-21

## 2022-03-21 RX ORDER — TRAZODONE HYDROCHLORIDE 100 MG/1
100 TABLET ORAL
Qty: 30 TABLET | Refills: 0 | Status: SHIPPED
Start: 2022-03-21

## 2022-03-21 RX ORDER — ACETAMINOPHEN 325 MG/1
650 TABLET ORAL
Qty: 30 TABLET | Refills: 0 | Status: SHIPPED | OUTPATIENT
Start: 2022-03-21

## 2022-03-21 RX ADMIN — PANCRELIPASE 1 CAPSULE: 60000; 12000; 38000 CAPSULE, DELAYED RELEASE PELLETS ORAL at 11:59

## 2022-03-21 RX ADMIN — Medication 1000 MCG: at 09:21

## 2022-03-21 RX ADMIN — LOSARTAN POTASSIUM 25 MG: 25 TABLET, FILM COATED ORAL at 09:21

## 2022-03-21 RX ADMIN — PROPRANOLOL HYDROCHLORIDE 60 MG: 20 TABLET ORAL at 09:21

## 2022-03-21 RX ADMIN — ENOXAPARIN SODIUM 40 MG: 100 INJECTION SUBCUTANEOUS at 09:21

## 2022-03-21 RX ADMIN — LEVOTHYROXINE SODIUM 100 MCG: 0.1 TABLET ORAL at 09:25

## 2022-03-21 RX ADMIN — CEPHALEXIN 500 MG: 250 CAPSULE ORAL at 09:25

## 2022-03-21 RX ADMIN — Medication 1 TABLET: at 09:21

## 2022-03-21 RX ADMIN — VENLAFAXINE HYDROCHLORIDE 75 MG: 75 CAPSULE, EXTENDED RELEASE ORAL at 09:20

## 2022-03-21 RX ADMIN — MAGNESIUM OXIDE TAB 400 MG (241.3 MG ELEMENTAL MG) 400 MG: 400 (241.3 MG) TAB at 09:20

## 2022-03-21 RX ADMIN — CLONAZEPAM 0.5 MG: 1 TABLET ORAL at 09:20

## 2022-03-21 RX ADMIN — WHITE PETROLATUM,ZINC OXIDE: 20; 75 PASTE TOPICAL at 09:00

## 2022-03-21 RX ADMIN — METFORMIN HYDROCHLORIDE 500 MG: 500 TABLET, EXTENDED RELEASE ORAL at 09:21

## 2022-03-21 RX ADMIN — Medication 2000 UNITS: at 09:21

## 2022-03-21 RX ADMIN — PANCRELIPASE 1 CAPSULE: 60000; 12000; 38000 CAPSULE, DELAYED RELEASE PELLETS ORAL at 09:25

## 2022-03-21 RX ADMIN — BACLOFEN 10 MG: 10 TABLET ORAL at 09:21

## 2022-03-21 RX ADMIN — GABAPENTIN 300 MG: 300 CAPSULE ORAL at 09:21

## 2022-03-21 RX ADMIN — SODIUM CHLORIDE, PRESERVATIVE FREE 10 ML: 5 INJECTION INTRAVENOUS at 09:25

## 2022-03-21 RX ADMIN — PRIMIDONE 250 MG: 250 TABLET ORAL at 09:21

## 2022-03-21 RX ADMIN — ASPIRIN 81 MG: 81 TABLET, COATED ORAL at 09:20

## 2022-03-21 RX ADMIN — ALOGLIPTIN 25 MG: 25 TABLET, FILM COATED ORAL at 09:21

## 2022-03-21 NOTE — PROGRESS NOTES
Discharge plan of care/case management plan validated with provider discharge order. Patient and  acknowledged understanding of dc instructions and medications awaiting pickup from preferred pharmacy.    Patient and  acknowledged understanding of scheduled follow up appointment with PCP for next Friday 04/01/2022 at 3pm.

## 2022-03-21 NOTE — DISCHARGE SUMMARY
HOSPITALIST DISCHARGE SUMMARY    NAME: Jean Graham   :  1955   MRN:  [id number]     Date/Time:  3/21/2022 11:08 AM    DISCHARGE DIAGNOSIS:  Active Problems:    E. coli UTI (urinary tract infection) (3/14/2022)    Dc Dx:   septic shock resolved  Dehydration/ebonie resolved  Uti e coli  Metabolic/infectious encephalopathy resolved  Asthenia      Admission Diagnosis:  Septic shock, uti, dehydration/ebonie, encephalopathy acute    CONSULTATIONS: Hospitalist    Procedures: see electronic medical records for all procedures/Xrays and details which were not copied into this note but were reviewed prior to creation of Plan. Please follow-up tests/labs that are still pendin. Follow cultures to completion    DISCHARGE SUMMARY/HOSPITAL COURSE:  for full details see H&P, daily progress notes, labs, consult notes. Chief Complaint : Altered mental status  Per HPI: 78 y.o. female with a history of diabetes, hypothyroidism, hypertension presents to the emergency room complaining of altered mental status as per . Avelina Fulling by the time she came to the ED she was very weak and slow and lethargic but able to wake up and give information. Kristina Coelho has difficult time to talk due to the tiredness and dryness.  Mouth is getting sticky due to dryness.  States started not feeling good 2 weeks ago, treated with antibiotic but when she finished that she started having again symptoms of urinary tract infection.  States its frequency and dysuria.  Cannot tell if there is any hematuria.  And started feeling very lethargy and tiredness.  Evaluation in the emergency room found with significant leukocytosis with left shift, hypotension, temperature 102. 2.  Suspected urinary tract infection, urine that was collected looks very cloudy and dark.  She is given fluid bolus with mild improvement in the blood pressure.  Admitted to medical telemetry but noted to have hypotension which did not respond to the fluid bolus, transfer to ICU. She was transiently on Levophed. Suspected hypovolemia secondary to dehydration and septic shock secondary to recurrent uti. Tolerated fluid resucitation. Sergio resolved. Ultimately stable hemodynamically at transferred out of icu. Empirically on ceftriaxone secondary to suspected uti. Peak wbc 31k, descended to ~10 by dc. Urine cs e coli, sensitive to ceftriaxone as well as cephalexin. Transitioned to Cephalexin po to complete course, tolerated and wbc descended to wnl. Remained very confused initial 2-3 days then mentation improved progressiveley to baseline by 3/20/22. Metabolic/infectious encephalopathy surmised and resolved. Pt with jermain hx and eventually did resume her home cpap device while in the icu. Pt with weakness at baseline, limited mobility, hx multiple back surheries, weaker than usual but walked her with  and feels she's not far from baseline and patient prefers no rehab facility preferring hhc/pt/ot, arranging. Incidentally she states she has never been to snf. Pt with hx recurrent uti's. She's been on supressive abx for preventing uti which she states helped but ran out and subsequent uti. She will need to return to pcp after  Completing abx course for acute uti deferring recs for supressive uti abx to pcp. Pt has all needed DME at home. Please confirm prior to admission  meds and dc meds, it was not clear whether on a couple meds, risperdone? Trazadone? Patient Vitals for the past 4 hrs:   Temp Pulse Resp BP SpO2   03/21/22 0815 98.4 °F (36.9 °C) 84 18 128/70 93 %       General:  Alert/interactive , cooperative, no distress, appears stated age. Head:  Normocephalic, without obvious abnormality, atraumatic. Eyes:  Conjunctivae/corneas clear. EOMs intact. Throat: Moist oral mucosa   Neck: Supple, symmetrical, trachea midline, no adenopathy, no JVD. Lungs:   Clear to auscultation bilaterally.    Chest wall:  No tenderness or deformity. Heart:  Regular rate and rhythm, S1, S2 normal, no murmur, click, rub or gallop. Abdomen:   Soft, non-tender, not distended. Bowel sounds present, No rebound or guarding. Extremities: Extremities normal, atraumatic, no cyanosis. No edema   Skin: Warm,dry   Neurologic: CNII-XII intact. Subtle rt lower ext weak>generalized weak. 3/20:Transferred self in bed, took about 25 steps in room with walker, though weak needed minimal assist though balance questionable.       _______________________________________________________________________  Medications Reviewed:  DISCHARGE MEDICATIONS:   Current Discharge Medication List      START taking these medications    Details   acetaminophen (TYLENOL) 325 mg tablet Take 2 Tablets by mouth every six (6) hours as needed for Pain or Fever. Qty: 30 Tablet, Refills: 0  Start date: 3/21/2022      cephALEXin (KEFLEX) 500 mg capsule Take 1 Capsule by mouth three (3) times daily for 5 days. Indications: urinary tract infection due to E. coli bacteria  Qty: 15 Capsule, Refills: 0  Start date: 3/21/2022, End date: 3/26/2022      magnesium oxide (MAG-OX) 400 mg tablet Take 1 Tablet by mouth two (2) times a day. Indications: low amount of magnesium in the blood  Qty: 30 Tablet, Refills: 0  Start date: 3/21/2022      Saccharomyces boulardii (Florastor) 250 mg capsule Take 1 Capsule by mouth two (2) times a day for 7 days. Qty: 14 Capsule, Refills: 0  Start date: 3/21/2022, End date: 3/28/2022         CONTINUE these medications which have CHANGED    Details   traZODone (DESYREL) 100 mg tablet Take 1 Tablet by mouth nightly. Qty: 30 Tablet, Refills: 0  Start date: 3/21/2022      traMADoL (ULTRAM) 50 mg tablet Take 1 Tablet by mouth every six (6) hours as needed for Pain for up to 7 days. Max Daily Amount: 200 mg.   Qty: 10 Tablet, Refills: 0  Start date: 3/21/2022, End date: 3/28/2022    Comments: Prior to admit med  Associated Diagnoses: Peripheral polyneuropathy         CONTINUE these medications which have NOT CHANGED    Details   lurasidone (Latuda) 60 mg tab tablet Take 60 mg by mouth nightly. metFORMIN ER (GLUCOPHAGE XR) 500 mg tablet Take 500 mg by mouth daily. fenofibrate (LOFIBRA) 54 mg tablet Take 54 mg by mouth daily. losartan (COZAAR) 25 mg tablet Take 25 mg by mouth daily. propranoloL (INDERAL) 60 mg tablet Take 60 mg by mouth three (3) times daily. gabapentin (NEURONTIN) 300 mg capsule Take 300 mg by mouth three (3) times daily. primidone (MYSOLINE) 250 mg tablet Take 250 mg by mouth three (3) times daily. risperiDONE (RisperDAL) 4 mg tablet Take 4 mg by mouth nightly. venlafaxine-SR (EFFEXOR-XR) 75 mg capsule Take 75 mg by mouth three (3) times daily. aspirin delayed-release 81 mg tablet Take  by mouth daily. b-complex with vitamin c tablet Take 1 tablet by mouth daily. baclofen (LIORESAL) 10 mg tablet Take 10 mg by mouth two (2) times a day. clonazePAM (KLONOPIN) 0.5 mg tablet Take 1 Tab by mouth three (3) times daily. Qty: 90 Tab, Refills: 0      cholecalciferol, vitamin D3, (VITAMIN D3) 2,000 unit Tab Take 1 Tab by mouth daily. cyanocobalamin (VITAMIN B-12) 1,000 mcg tablet Take 1,000 mcg by mouth daily. levothyroxine (LEVOTHROID) 100 mcg tablet Take 100 mcg by mouth Daily (before breakfast). sitaGLIPtin (JANUVIA) 100 mg tablet Take 100 mg by mouth daily. Only takes if glucose is above 150 every morning  Indications: TYPE 2 DIABETES MELLITUS      melatonin 3 mg tablet Take 3 mg by mouth nightly. LIPASE/PROTEASE/AMYLASE (PANCRELIPASE 5000 PO) Take 4 Tabs by mouth three (3) times daily (with meals).          STOP taking these medications       etodolac (LODINE) 500 mg tablet Comments:   Reason for Stopping:         pilocarpine (SALAGEN) 5 mg tablet Comments:   Reason for Stopping:         hydroxychloroquine (PLAQUENIL) 200 mg tablet Comments:   Reason for Stopping: liothyronine (CYTOMEL) 5 mcg tablet Comments:   Reason for Stopping:         oxyCODONE-acetaminophen (PERCOCET) 5-325 mg per tablet Comments:   Reason for Stopping:         losartan-hydrochlorothiazide (HYZAAR) 100-12.5 mg per tablet Comments:   Reason for Stopping:         pantoprazole (PROTONIX) 20 mg tablet Comments:   Reason for Stopping:         atenolol (TENORMIN) 50 mg tablet Comments:   Reason for Stopping:         busPIRone (BUSPAR) 15 mg tablet Comments:   Reason for Stopping:         QUEtiapine (SEROQUEL) 200 mg tablet Comments:   Reason for Stopping:         DULoxetine (CYMBALTA) 60 mg capsule Comments:   Reason for Stopping:         pregabalin (LYRICA) 150 mg capsule Comments:   Reason for Stopping:         metaxalone (SKELAXIN) 800 mg tablet Comments:   Reason for Stopping:         omega-3 acid ethyl esters (LOVAZA) 1 gram capsule Comments:   Reason for Stopping:         colestipol (COLESTID) 1 gram tablet Comments:   Reason for Stopping:                 Recommended diet:  Cardiac Diet and Diabetic Diet  Recommended activity:Activity as tolerated       Follow Up:  1- pcp , set up an appointment to see them in 7 days.       _______________________________________________________________________  DISPOSITION:    Home with Family:    Home with HH/PT/OT/RN: x   SNF/LTC:    IL[facility]ONSO:    OTHER:    ________________________________________________________________________    Total time spent in discharge (min): 45   ________________________________________________________________________    Care Plan discussed with:    Comments   Patient x    Family  x    RN x    Care Manager x                   Consultant:      ________________________________________________________________________  Attending Physician: Sapphire Sheikh, MD  ________________________________________________________________________  **Lab Data Reviewed:  Recent Results (from the past 24 hour(s))   GLUCOSE, POC    Collection Time: 03/20/22 12:50 PM   Result Value Ref Range    Glucose (POC) 117 65 - 117 mg/dL    Performed by Erika Angel    GLUCOSE, POC    Collection Time: 03/20/22  4:28 PM   Result Value Ref Range    Glucose (POC) 136 (H) 65 - 117 mg/dL    Performed by Venecia Estevez 429, POC    Collection Time: 03/20/22  9:40 PM   Result Value Ref Range    Glucose (POC) 138 (H) 65 - 117 mg/dL    Performed by AIDE BANUELOS    METABOLIC PANEL, BASIC    Collection Time: 03/21/22  6:51 AM   Result Value Ref Range    Sodium 140 136 - 145 mmol/L    Potassium 3.8 3.5 - 5.1 mmol/L    Chloride 109 (H) 97 - 108 mmol/L    CO2 28 21 - 32 mmol/L    Anion gap 3 (L) 5 - 15 mmol/L    Glucose 119 (H) 65 - 100 mg/dL    BUN 15 6 - 20 mg/dL    Creatinine 0.54 (L) 0.55 - 1.02 mg/dL    BUN/Creatinine ratio 28 (H) 12 - 20      GFR est AA >60 >60 ml/min/1.73m2    GFR est non-AA >60 >60 ml/min/1.73m2    Calcium 9.2 8.5 - 10.1 mg/dL   MAGNESIUM    Collection Time: 03/21/22  6:51 AM   Result Value Ref Range    Magnesium 2.1 1.6 - 2.4 mg/dL   GLUCOSE, POC    Collection Time: 03/21/22  8:27 AM   Result Value Ref Range    Glucose (POC) 116 65 - 117 mg/dL    Performed by [facility]

## 2022-03-21 NOTE — PROGRESS NOTES
Nutrition Assessment     Type and Reason for Visit: RD nutrition re-screen/LOS    Nutrition Recommendations/Plan:   Continue current diet. Monitor and record intakes and Bms in I/Os. Nutrition Assessment:  Admitted for AMS. Endorses good intakes (>76%) PTA, no weight loss. Intakes poor at beginning of hospital stay, now returned to baseline. Labs: Na 140, K 3.8, BUN 15, Cr 0.54, Gluc 119. Meds: b-complex with vitamin C, cholecalciferol, cyanocobalamin, insulin lispro, levothyroxine, creon 11960, losartan, magnesium oxide, metformin, proranolol. Nutrition Related Findings:  (P) Appears nourished. Denies n/v, d/c, or problems chewing/swallowing. Last BM 3/19. No edema.       Current Nutrition Therapies:  ADULT DIET Dysphagia - Minced & Moist    Anthropometric Measures:  Height:  5' 2.99\" (160 cm)  Current Body Wt:  86.7 kg (191 lb 2.2 oz)  BMI: 33.9    Nutrition Diagnosis:   (P) No nutrition diagnosis at this time    Nutrition Intervention:  Food and/or Nutrient Delivery: (P) Continue current diet  Nutrition Education and Counseling: (P) Education not indicated  Coordination of Nutrition Care: (P) Continue to monitor while inpatient    Nutrition Monitoring and Evaluation:   Food/Nutrient Intake Outcomes: (P) Food and nutrient intake  Physical Signs/Symptoms Outcomes: (P) Meal time behavior,Weight    Discharge Planning:    (P) Too soon to determine     Electronically signed by Sandra Reyes RD on 3/21/2022 at 11:49 AM    Contact Number: [phone]

## 2022-03-21 NOTE — PROGRESS NOTES
CM met with patient and spouse at bedside to discuss DC plans and therapy recommendations for SNF vs. HH. Patient does not want to go to SNF and wants HH, no preference in agency. Patient has RW, BSC and WC at home and states that she does not need a hospital bed. CM sent [facility] referrals. -----------------------------------------------------------    1140- Patient accepted with [facility] date 3/22/22. CM notified patient and spouse. Medicare pt has received, reviewed, and signed 2nd IM letter informing them of their right to appeal the discharge. Signed copied has been placed on pt bedside chart.    ------------------------------------------------------------    1300- Discharge plan of care/case management plan validated with provider discharge order.

## 2022-03-24 PROBLEM — G93.41 METABOLIC ENCEPHALOPATHY: Status: RESOLVED | Noted: 2022-03-21 | Resolved: 2022-03-21

## 2022-03-24 PROBLEM — E86.0 DEHYDRATION: Status: RESOLVED | Noted: 2022-03-21 | Resolved: 2022-03-21

## 2022-03-24 PROBLEM — B96.20 E. COLI UTI (URINARY TRACT INFECTION): Status: ACTIVE | Noted: 2022-03-14

## 2022-03-24 PROBLEM — A41.9 SEPTIC SHOCK (HCC): Status: RESOLVED | Noted: 2022-03-14 | Resolved: 2022-03-21

## 2022-03-24 PROBLEM — N17.9 AKI (ACUTE KIDNEY INJURY) (HCC): Status: RESOLVED | Noted: 2022-03-21 | Resolved: 2022-03-21

## 2022-03-24 PROBLEM — G93.41 METABOLIC ENCEPHALOPATHY: Status: ACTIVE | Noted: 2022-03-21

## 2022-03-24 PROBLEM — R65.21 SEPTIC SHOCK (HCC): Status: ACTIVE | Noted: 2022-03-14

## 2022-03-24 PROBLEM — N39.0 RECURRENT UTI: Status: ACTIVE | Noted: 2022-03-14

## 2022-03-24 PROBLEM — A41.9 SEPTIC SHOCK (HCC): Status: ACTIVE | Noted: 2022-03-14

## 2022-03-24 PROBLEM — R65.21 SEPTIC SHOCK (HCC): Status: RESOLVED | Noted: 2022-03-14 | Resolved: 2022-03-21

## 2022-03-24 PROBLEM — E86.0 DEHYDRATION: Status: ACTIVE | Noted: 2022-03-21

## 2022-03-24 PROBLEM — N17.9 AKI (ACUTE KIDNEY INJURY) (HCC): Status: ACTIVE | Noted: 2022-03-21

## 2022-03-24 PROBLEM — N39.0 E. COLI UTI (URINARY TRACT INFECTION): Status: ACTIVE | Noted: 2022-03-14

## 2024-04-17 ENCOUNTER — HOSPITAL ENCOUNTER (INPATIENT)
Facility: HOSPITAL | Age: 69
LOS: 2 days | Discharge: HOME HEALTH | End: 2024-04-19
Attending: STUDENT IN AN ORGANIZED HEALTH CARE EDUCATION/TRAINING PROGRAM | Admitting: INTERNAL MEDICINE
Payer: MEDICARE

## 2024-04-17 ENCOUNTER — APPOINTMENT (OUTPATIENT)
Facility: HOSPITAL | Age: 69
End: 2024-04-17
Payer: MEDICARE

## 2024-04-17 DIAGNOSIS — R53.1 GENERAL WEAKNESS: Primary | ICD-10-CM

## 2024-04-17 DIAGNOSIS — N39.0 URINARY TRACT INFECTION IN FEMALE: ICD-10-CM

## 2024-04-17 LAB
ALBUMIN SERPL-MCNC: 3.6 G/DL (ref 3.5–5)
ALBUMIN/GLOB SERPL: 0.9 (ref 1.1–2.2)
ALP SERPL-CCNC: 87 U/L (ref 45–117)
ALT SERPL-CCNC: 44 U/L (ref 12–78)
AMMONIA PLAS-SCNC: 13 UMOL/L
AMPHET UR QL SCN: NEGATIVE
ANION GAP SERPL CALC-SCNC: 7 MMOL/L (ref 5–15)
APAP SERPL-MCNC: <2 UG/ML (ref 10–30)
APPEARANCE UR: ABNORMAL
AST SERPL W P-5'-P-CCNC: 22 U/L (ref 15–37)
BACTERIA URNS QL MICRO: NEGATIVE /HPF
BARBITURATES UR QL SCN: POSITIVE
BASOPHILS # BLD: 0.1 K/UL (ref 0–0.1)
BASOPHILS NFR BLD: 1 % (ref 0–1)
BENZODIAZ UR QL: NEGATIVE
BILIRUB SERPL-MCNC: 0.7 MG/DL (ref 0.2–1)
BILIRUB UR QL: NEGATIVE
BNP SERPL-MCNC: 405 PG/ML
BUN SERPL-MCNC: 11 MG/DL (ref 6–20)
BUN/CREAT SERPL: 14 (ref 12–20)
CA-I BLD-MCNC: 9.2 MG/DL (ref 8.5–10.1)
CANNABINOIDS UR QL SCN: NEGATIVE
CHLORIDE SERPL-SCNC: 103 MMOL/L (ref 97–108)
CO2 SERPL-SCNC: 25 MMOL/L (ref 21–32)
COCAINE UR QL SCN: NEGATIVE
COLOR UR: ABNORMAL
CREAT SERPL-MCNC: 0.81 MG/DL (ref 0.55–1.02)
DIFFERENTIAL METHOD BLD: ABNORMAL
EKG ATRIAL RATE: 76 BPM
EKG DIAGNOSIS: NORMAL
EKG P AXIS: 78 DEGREES
EKG P-R INTERVAL: 248 MS
EKG Q-T INTERVAL: 372 MS
EKG QRS DURATION: 92 MS
EKG QTC CALCULATION (BAZETT): 418 MS
EKG R AXIS: -22 DEGREES
EKG T AXIS: 20 DEGREES
EKG VENTRICULAR RATE: 76 BPM
EOSINOPHIL # BLD: 0.1 K/UL (ref 0–0.4)
EOSINOPHIL NFR BLD: 1 % (ref 0–7)
ERYTHROCYTE [DISTWIDTH] IN BLOOD BY AUTOMATED COUNT: 12.9 % (ref 11.5–14.5)
ETHANOL SERPL-MCNC: <10 MG/DL (ref 0–0.08)
FLUAV AG NPH QL IA: NEGATIVE
FLUBV AG NOSE QL IA: NEGATIVE
GLOBULIN SER CALC-MCNC: 3.8 G/DL (ref 2–4)
GLUCOSE BLD STRIP.AUTO-MCNC: 173 MG/DL (ref 65–100)
GLUCOSE BLD STRIP.AUTO-MCNC: 186 MG/DL (ref 65–100)
GLUCOSE BLD STRIP.AUTO-MCNC: 187 MG/DL (ref 65–100)
GLUCOSE SERPL-MCNC: 204 MG/DL (ref 65–100)
GLUCOSE UR STRIP.AUTO-MCNC: NEGATIVE MG/DL
HCT VFR BLD AUTO: 39.6 % (ref 35–47)
HGB BLD-MCNC: 13.5 G/DL (ref 11.5–16)
HGB UR QL STRIP: ABNORMAL
IMM GRANULOCYTES # BLD AUTO: 0.1 K/UL (ref 0–0.04)
IMM GRANULOCYTES NFR BLD AUTO: 1 % (ref 0–0.5)
KETONES UR QL STRIP.AUTO: NEGATIVE MG/DL
LACTATE BLD-SCNC: 0.88 MMOL/L (ref 0.4–2)
LEUKOCYTE ESTERASE UR QL STRIP.AUTO: ABNORMAL
LYMPHOCYTES # BLD: 2.2 K/UL (ref 0.8–3.5)
LYMPHOCYTES NFR BLD: 14 % (ref 12–49)
Lab: ABNORMAL
MCH RBC QN AUTO: 31.4 PG (ref 26–34)
MCHC RBC AUTO-ENTMCNC: 34.1 G/DL (ref 30–36.5)
MCV RBC AUTO: 92.1 FL (ref 80–99)
METHADONE UR QL: NEGATIVE
MONOCYTES # BLD: 1.4 K/UL (ref 0–1)
MONOCYTES NFR BLD: 9 % (ref 5–13)
NEUTS SEG # BLD: 11.6 K/UL (ref 1.8–8)
NEUTS SEG NFR BLD: 74 % (ref 32–75)
NITRITE UR QL STRIP.AUTO: NEGATIVE
NRBC # BLD: 0 K/UL (ref 0–0.01)
NRBC BLD-RTO: 0 PER 100 WBC
OPIATES UR QL: NEGATIVE
PCP UR QL: NEGATIVE
PERFORMED BY:: ABNORMAL
PERFORMED BY:: NORMAL
PH UR STRIP: 6 (ref 5–8)
PLATELET # BLD AUTO: 215 K/UL (ref 150–400)
PMV BLD AUTO: 10.5 FL (ref 8.9–12.9)
POTASSIUM SERPL-SCNC: 4 MMOL/L (ref 3.5–5.1)
PROT SERPL-MCNC: 7.4 G/DL (ref 6.4–8.2)
PROT UR STRIP-MCNC: 100 MG/DL
RBC # BLD AUTO: 4.3 M/UL (ref 3.8–5.2)
RBC #/AREA URNS HPF: ABNORMAL /HPF (ref 0–5)
SALICYLATES SERPL-MCNC: <1.7 MG/DL (ref 2.8–20)
SODIUM SERPL-SCNC: 135 MMOL/L (ref 136–145)
SP GR UR REFRACTOMETRY: 1.01 (ref 1–1.03)
TROPONIN I SERPL HS-MCNC: 9 NG/L (ref 0–51)
TSH SERPL DL<=0.05 MIU/L-ACNC: 1.87 UIU/ML (ref 0.36–3.74)
URINE CULTURE IF INDICATED: ABNORMAL
UROBILINOGEN UR QL STRIP.AUTO: 0.1 EU/DL (ref 0.1–1)
WBC # BLD AUTO: 15.4 K/UL (ref 3.6–11)
WBC URNS QL MICRO: >100 /HPF (ref 0–4)

## 2024-04-17 PROCEDURE — 85025 COMPLETE CBC W/AUTO DIFF WBC: CPT

## 2024-04-17 PROCEDURE — 83880 ASSAY OF NATRIURETIC PEPTIDE: CPT

## 2024-04-17 PROCEDURE — 2580000003 HC RX 258: Performed by: STUDENT IN AN ORGANIZED HEALTH CARE EDUCATION/TRAINING PROGRAM

## 2024-04-17 PROCEDURE — 83605 ASSAY OF LACTIC ACID: CPT

## 2024-04-17 PROCEDURE — 80179 DRUG ASSAY SALICYLATE: CPT

## 2024-04-17 PROCEDURE — 6360000002 HC RX W HCPCS: Performed by: STUDENT IN AN ORGANIZED HEALTH CARE EDUCATION/TRAINING PROGRAM

## 2024-04-17 PROCEDURE — 84443 ASSAY THYROID STIM HORMONE: CPT

## 2024-04-17 PROCEDURE — 93005 ELECTROCARDIOGRAM TRACING: CPT | Performed by: STUDENT IN AN ORGANIZED HEALTH CARE EDUCATION/TRAINING PROGRAM

## 2024-04-17 PROCEDURE — 87086 URINE CULTURE/COLONY COUNT: CPT

## 2024-04-17 PROCEDURE — 84484 ASSAY OF TROPONIN QUANT: CPT

## 2024-04-17 PROCEDURE — 80053 COMPREHEN METABOLIC PANEL: CPT

## 2024-04-17 PROCEDURE — 6370000000 HC RX 637 (ALT 250 FOR IP): Performed by: STUDENT IN AN ORGANIZED HEALTH CARE EDUCATION/TRAINING PROGRAM

## 2024-04-17 PROCEDURE — 80307 DRUG TEST PRSMV CHEM ANLYZR: CPT

## 2024-04-17 PROCEDURE — 96374 THER/PROPH/DIAG INJ IV PUSH: CPT

## 2024-04-17 PROCEDURE — 81001 URINALYSIS AUTO W/SCOPE: CPT

## 2024-04-17 PROCEDURE — 87040 BLOOD CULTURE FOR BACTERIA: CPT

## 2024-04-17 PROCEDURE — 87186 SC STD MICRODIL/AGAR DIL: CPT

## 2024-04-17 PROCEDURE — 70450 CT HEAD/BRAIN W/O DYE: CPT

## 2024-04-17 PROCEDURE — 6370000000 HC RX 637 (ALT 250 FOR IP): Performed by: HOSPITALIST

## 2024-04-17 PROCEDURE — 82140 ASSAY OF AMMONIA: CPT

## 2024-04-17 PROCEDURE — 87804 INFLUENZA ASSAY W/OPTIC: CPT

## 2024-04-17 PROCEDURE — 71045 X-RAY EXAM CHEST 1 VIEW: CPT

## 2024-04-17 PROCEDURE — 82962 GLUCOSE BLOOD TEST: CPT

## 2024-04-17 PROCEDURE — 99285 EMERGENCY DEPT VISIT HI MDM: CPT

## 2024-04-17 PROCEDURE — 87088 URINE BACTERIA CULTURE: CPT

## 2024-04-17 PROCEDURE — 6370000000 HC RX 637 (ALT 250 FOR IP): Performed by: INTERNAL MEDICINE

## 2024-04-17 PROCEDURE — 82077 ASSAY SPEC XCP UR&BREATH IA: CPT

## 2024-04-17 PROCEDURE — 1100000000 HC RM PRIVATE

## 2024-04-17 PROCEDURE — 80143 DRUG ASSAY ACETAMINOPHEN: CPT

## 2024-04-17 PROCEDURE — 36415 COLL VENOUS BLD VENIPUNCTURE: CPT

## 2024-04-17 RX ORDER — POLYETHYLENE GLYCOL 3350 17 G/17G
17 POWDER, FOR SOLUTION ORAL DAILY PRN
Status: DISCONTINUED | OUTPATIENT
Start: 2024-04-17 | End: 2024-04-19 | Stop reason: HOSPADM

## 2024-04-17 RX ORDER — SODIUM CHLORIDE 0.9 % (FLUSH) 0.9 %
5-40 SYRINGE (ML) INJECTION EVERY 12 HOURS SCHEDULED
Status: DISCONTINUED | OUTPATIENT
Start: 2024-04-17 | End: 2024-04-19 | Stop reason: HOSPADM

## 2024-04-17 RX ORDER — INSULIN LISPRO 100 [IU]/ML
0-4 INJECTION, SOLUTION INTRAVENOUS; SUBCUTANEOUS NIGHTLY
Status: DISCONTINUED | OUTPATIENT
Start: 2024-04-17 | End: 2024-04-19 | Stop reason: HOSPADM

## 2024-04-17 RX ORDER — LANOLIN ALCOHOL/MO/W.PET/CERES
1000 CREAM (GRAM) TOPICAL DAILY
Status: DISCONTINUED | OUTPATIENT
Start: 2024-04-17 | End: 2024-04-19 | Stop reason: HOSPADM

## 2024-04-17 RX ORDER — 0.9 % SODIUM CHLORIDE 0.9 %
1000 INTRAVENOUS SOLUTION INTRAVENOUS ONCE
Status: COMPLETED | OUTPATIENT
Start: 2024-04-17 | End: 2024-04-17

## 2024-04-17 RX ORDER — ACETAMINOPHEN 325 MG/1
650 TABLET ORAL EVERY 6 HOURS PRN
Status: DISCONTINUED | OUTPATIENT
Start: 2024-04-17 | End: 2024-04-19 | Stop reason: HOSPADM

## 2024-04-17 RX ORDER — LEVOTHYROXINE SODIUM 0.03 MG/1
50 TABLET ORAL
Status: DISCONTINUED | OUTPATIENT
Start: 2024-04-19 | End: 2024-04-19 | Stop reason: HOSPADM

## 2024-04-17 RX ORDER — SODIUM CHLORIDE 9 MG/ML
INJECTION, SOLUTION INTRAVENOUS PRN
Status: DISCONTINUED | OUTPATIENT
Start: 2024-04-17 | End: 2024-04-19 | Stop reason: HOSPADM

## 2024-04-17 RX ORDER — SODIUM CHLORIDE 0.9 % (FLUSH) 0.9 %
5-40 SYRINGE (ML) INJECTION PRN
Status: DISCONTINUED | OUTPATIENT
Start: 2024-04-17 | End: 2024-04-19 | Stop reason: HOSPADM

## 2024-04-17 RX ORDER — INSULIN LISPRO 100 [IU]/ML
0-8 INJECTION, SOLUTION INTRAVENOUS; SUBCUTANEOUS
Status: DISCONTINUED | OUTPATIENT
Start: 2024-04-17 | End: 2024-04-19 | Stop reason: HOSPADM

## 2024-04-17 RX ORDER — TRAZODONE HYDROCHLORIDE 50 MG/1
100 TABLET ORAL NIGHTLY PRN
Status: DISCONTINUED | OUTPATIENT
Start: 2024-04-17 | End: 2024-04-19 | Stop reason: HOSPADM

## 2024-04-17 RX ORDER — ONDANSETRON 2 MG/ML
4 INJECTION INTRAMUSCULAR; INTRAVENOUS EVERY 6 HOURS PRN
Status: DISCONTINUED | OUTPATIENT
Start: 2024-04-17 | End: 2024-04-19 | Stop reason: HOSPADM

## 2024-04-17 RX ORDER — MAGNESIUM SULFATE IN WATER 40 MG/ML
2000 INJECTION, SOLUTION INTRAVENOUS PRN
Status: DISCONTINUED | OUTPATIENT
Start: 2024-04-17 | End: 2024-04-19 | Stop reason: HOSPADM

## 2024-04-17 RX ORDER — ONDANSETRON 4 MG/1
4 TABLET, ORALLY DISINTEGRATING ORAL EVERY 8 HOURS PRN
Status: DISCONTINUED | OUTPATIENT
Start: 2024-04-17 | End: 2024-04-19 | Stop reason: HOSPADM

## 2024-04-17 RX ORDER — FENOFIBRATE 54 MG/1
54 TABLET ORAL DAILY
Status: DISCONTINUED | OUTPATIENT
Start: 2024-04-17 | End: 2024-04-17

## 2024-04-17 RX ORDER — ASPIRIN 81 MG/1
81 TABLET ORAL DAILY
Status: DISCONTINUED | OUTPATIENT
Start: 2024-04-17 | End: 2024-04-19 | Stop reason: HOSPADM

## 2024-04-17 RX ORDER — POTASSIUM CHLORIDE 20 MEQ/1
40 TABLET, EXTENDED RELEASE ORAL PRN
Status: DISCONTINUED | OUTPATIENT
Start: 2024-04-17 | End: 2024-04-19 | Stop reason: HOSPADM

## 2024-04-17 RX ORDER — POTASSIUM CHLORIDE 7.45 MG/ML
10 INJECTION INTRAVENOUS PRN
Status: DISCONTINUED | OUTPATIENT
Start: 2024-04-17 | End: 2024-04-19 | Stop reason: HOSPADM

## 2024-04-17 RX ORDER — LOSARTAN POTASSIUM 25 MG/1
12.5 TABLET ORAL DAILY
Status: DISCONTINUED | OUTPATIENT
Start: 2024-04-17 | End: 2024-04-19 | Stop reason: HOSPADM

## 2024-04-17 RX ORDER — RISPERIDONE 1 MG/1
1 TABLET ORAL NIGHTLY
Status: DISCONTINUED | OUTPATIENT
Start: 2024-04-17 | End: 2024-04-19 | Stop reason: HOSPADM

## 2024-04-17 RX ORDER — CLONAZEPAM 0.5 MG/1
0.5 TABLET ORAL 3 TIMES DAILY
Status: DISCONTINUED | OUTPATIENT
Start: 2024-04-17 | End: 2024-04-17

## 2024-04-17 RX ORDER — VITAMIN B COMPLEX
2 TABLET ORAL DAILY
Status: DISCONTINUED | OUTPATIENT
Start: 2024-04-17 | End: 2024-04-19 | Stop reason: HOSPADM

## 2024-04-17 RX ORDER — ONDANSETRON 2 MG/ML
4 INJECTION INTRAMUSCULAR; INTRAVENOUS ONCE
Status: COMPLETED | OUTPATIENT
Start: 2024-04-17 | End: 2024-04-17

## 2024-04-17 RX ORDER — PRIMIDONE 250 MG/1
250 TABLET ORAL 3 TIMES DAILY
Status: DISCONTINUED | OUTPATIENT
Start: 2024-04-17 | End: 2024-04-19 | Stop reason: HOSPADM

## 2024-04-17 RX ORDER — PROPRANOLOL HYDROCHLORIDE 10 MG/1
10 TABLET ORAL 3 TIMES DAILY
Status: DISCONTINUED | OUTPATIENT
Start: 2024-04-17 | End: 2024-04-19 | Stop reason: HOSPADM

## 2024-04-17 RX ORDER — MAGNESIUM OXIDE 400 MG/1
400 TABLET ORAL 2 TIMES DAILY
Status: DISCONTINUED | OUTPATIENT
Start: 2024-04-17 | End: 2024-04-17

## 2024-04-17 RX ORDER — LANOLIN ALCOHOL/MO/W.PET/CERES
3 CREAM (GRAM) TOPICAL NIGHTLY
Status: DISCONTINUED | OUTPATIENT
Start: 2024-04-17 | End: 2024-04-17

## 2024-04-17 RX ORDER — LURASIDONE HYDROCHLORIDE 60 MG/1
60 TABLET, FILM COATED ORAL EVERY MORNING
COMMUNITY
Start: 2024-04-18

## 2024-04-17 RX ORDER — ENOXAPARIN SODIUM 100 MG/ML
40 INJECTION SUBCUTANEOUS DAILY
Status: DISCONTINUED | OUTPATIENT
Start: 2024-04-17 | End: 2024-04-19 | Stop reason: HOSPADM

## 2024-04-17 RX ORDER — GABAPENTIN 300 MG/1
300 CAPSULE ORAL 3 TIMES DAILY
Status: DISCONTINUED | OUTPATIENT
Start: 2024-04-17 | End: 2024-04-19 | Stop reason: HOSPADM

## 2024-04-17 RX ORDER — ACETAMINOPHEN 650 MG/1
650 SUPPOSITORY RECTAL EVERY 6 HOURS PRN
Status: DISCONTINUED | OUTPATIENT
Start: 2024-04-17 | End: 2024-04-19 | Stop reason: HOSPADM

## 2024-04-17 RX ORDER — CLONAZEPAM 0.5 MG/1
0.5 TABLET ORAL 2 TIMES DAILY
Status: DISCONTINUED | OUTPATIENT
Start: 2024-04-17 | End: 2024-04-19 | Stop reason: HOSPADM

## 2024-04-17 RX ADMIN — TRAZODONE HYDROCHLORIDE 100 MG: 50 TABLET ORAL at 23:51

## 2024-04-17 RX ADMIN — GABAPENTIN 300 MG: 300 CAPSULE ORAL at 14:38

## 2024-04-17 RX ADMIN — SODIUM CHLORIDE 1000 ML: 9 INJECTION, SOLUTION INTRAVENOUS at 06:33

## 2024-04-17 RX ADMIN — ASPIRIN 81 MG: 81 TABLET, COATED ORAL at 17:32

## 2024-04-17 RX ADMIN — SODIUM CHLORIDE, PRESERVATIVE FREE 10 ML: 5 INJECTION INTRAVENOUS at 08:22

## 2024-04-17 RX ADMIN — PROPRANOLOL HYDROCHLORIDE 10 MG: 10 TABLET ORAL at 17:33

## 2024-04-17 RX ADMIN — GABAPENTIN 300 MG: 300 CAPSULE ORAL at 20:44

## 2024-04-17 RX ADMIN — Medication 1000 MCG: at 17:36

## 2024-04-17 RX ADMIN — PRIMIDONE 250 MG: 250 TABLET ORAL at 17:34

## 2024-04-17 RX ADMIN — Medication 2000 UNITS: at 17:40

## 2024-04-17 RX ADMIN — PROPRANOLOL HYDROCHLORIDE 10 MG: 10 TABLET ORAL at 20:43

## 2024-04-17 RX ADMIN — ONDANSETRON 4 MG: 2 INJECTION INTRAMUSCULAR; INTRAVENOUS at 05:04

## 2024-04-17 RX ADMIN — CEFTRIAXONE SODIUM 1000 MG: 1 INJECTION, POWDER, FOR SOLUTION INTRAMUSCULAR; INTRAVENOUS at 08:21

## 2024-04-17 RX ADMIN — CLONAZEPAM 0.5 MG: 0.5 TABLET ORAL at 20:43

## 2024-04-17 ASSESSMENT — ENCOUNTER SYMPTOMS
BACK PAIN: 0
COUGH: 0
DIARRHEA: 0
SHORTNESS OF BREATH: 0
NAUSEA: 0
ABDOMINAL PAIN: 0
VOMITING: 0

## 2024-04-17 ASSESSMENT — PAIN SCALES - GENERAL
PAINLEVEL_OUTOF10: 4
PAINLEVEL_OUTOF10: 0

## 2024-04-17 ASSESSMENT — LIFESTYLE VARIABLES
HOW MANY STANDARD DRINKS CONTAINING ALCOHOL DO YOU HAVE ON A TYPICAL DAY: PATIENT DOES NOT DRINK
HOW OFTEN DO YOU HAVE A DRINK CONTAINING ALCOHOL: NEVER
HOW OFTEN DO YOU HAVE A DRINK CONTAINING ALCOHOL: NEVER

## 2024-04-17 ASSESSMENT — PAIN - FUNCTIONAL ASSESSMENT: PAIN_FUNCTIONAL_ASSESSMENT: ADULT NONVERBAL PAIN SCALE (NPVS)

## 2024-04-17 NOTE — ED NOTES
Pt attempting to get out of bed. Writer into bed to assist, 22G IV noted to be in the floor, blood on hands, appears that patient has pulled out IV. Incontinence care provided, pajama set removed and placed into belongings bag, pt into hospital gown. Purewick replaced, linen changed. 18G IV in R AC wrapped in coband at this time. Pt reminded that she is in the hospital, pt requesting \"nerve pill\". Primary RN made aware  
Sepsis not alerted per MD Ruben   
Spoke with patient's son Abdelrahman. He relayed that patient only takes Klonopin BID, not TID as originally ordered. This relayed to pharmacy after they questioned frequency.   
signed by SAMUEL MTZ RN on 4/17/2024 at 6:12 PM

## 2024-04-17 NOTE — ED PROVIDER NOTES
[facility] EMERGENCY DEPT  EMERGENCY DEPARTMENT HISTORY AND PHYSICAL EXAM      Date: 4/17/2024  Patient Name: Estefania Jackson  MRN: [id number]  Birthdate 1955  Date of evaluation: 4/17/2024  Provider: Devan Miranda DO   Note Started: 4:48 AM EDT 4/17/24    HISTORY OF PRESENT ILLNESS     Chief Complaint   Patient presents with    Fatigue       History Provided By: Patient    HPI: Estefania Jackson is a 68 y.o. female with past history as reviewed below who presents to the emergency department due to generalized weakness and fatigue that is been ongoing since yesterday.  Patient states that she attempted to get out of the bed however had difficulty walking and prompted a visit to the emergency department.  Of note, patient followed by Dr. Miller, neurology, for ataxia and chronic generalized weakness.  Patient is able to answer questions verbally however quickly falls back asleep thereafter.  Denies any fever, abdominal pain, vomiting, focal weakness, numbness, chest pain, shortness of breath, headache, vision changes, or any other symptoms complaints.  History limited secondary to patient being a poor historian.    PAST MEDICAL HISTORY   Past Medical History:  Past Medical History:   Diagnosis Date    Arthritis     Chronic pain     back pain    Chronic pain     fibromyalgia    Diabetes (HCC)     steroid induced per pt    Fibromyalgia     chronic pain    GERD (gastroesophageal reflux disease)     Hypercholesteremia     Hypertension     Hypothyroid     hypothyroid    Migraine     Morbid obesity (HCC)     Other ill-defined conditions(799.89)     extreme dry mouth (due to meds pt stated)    Pernicious anemia     Psychiatric disorder     anxiety and depression    Unspecified sleep apnea     cpap    Urinary incontinence        Past Surgical History:  Past Surgical History:   Procedure Laterality Date    APPENDECTOMY      BREAST LUMPECTOMY  2011    left    CHOLECYSTECTOMY      HYSTERECTOMY, TOTAL ABDOMINAL (CERVIX REMOVED)   1981    menorrhagia, dysmenorrhea;  thinks she may have had one ovary removed.    ORTHOPEDIC SURGERY      x4 spine surgeries    ORTHOPEDIC SURGERY  5/28/13    L2-5 DECOMPRESSION AND FUSION POSTERIOR MULTIILEVEL    OTHER SURGICAL HISTORY      laparoscopy - not sure why    UROLOGICAL SURGERY  10/10/14    Urodynamics    UROLOGICAL SURGERY      surgery for incontinence       Family History:  Family History   Problem Relation Age of Onset    Diabetes Mother     Hypertension Mother     Stroke Mother     COPD Brother     Hypertension Brother     Diabetes Brother     Heart Disease Brother     Heart Disease Mother     Hypertension Father     Heart Disease Father     Diabetes Father     Stroke Father     Diabetes Brother     Hypertension Brother     Stroke Brother     Heart Disease Brother        Social History:  Social History     Tobacco Use    Smoking status: Never    Smokeless tobacco: Never   Substance Use Topics    Alcohol use: Yes    Drug use: No       Allergies:  Allergies   Allergen Reactions    Sulfa Antibiotics Hives and Itching    Ace Inhibitors Other (See Comments)     Causes liver enzymes to elevate       PCP: Alea Diaz MD    Current Meds:   Current Facility-Administered Medications   Medication Dose Route Frequency Provider Last Rate Last Admin    cefTRIAXone (ROCEPHIN) 1,000 mg in sterile water 10 mL IV syringe  1,000 mg IntraVENous NOW Devan Miranda, DO         Current Outpatient Medications   Medication Sig Dispense Refill    acetaminophen (TYLENOL) 325 MG tablet Take 2 tablets by mouth every 6 hours as needed      aspirin 81 MG EC tablet Take by mouth daily      baclofen (LIORESAL) 10 MG tablet Take 1 tablet by mouth 2 times daily      Cholecalciferol 50 MCG (2000 UT) TABS Take 1 tablet by mouth daily      clonazePAM (KLONOPIN) 0.5 MG tablet Take 1 tablet by mouth 3 times daily. Max Daily Amount: 1.5 mg      cyanocobalamin 1000 MCG tablet Take 1 tablet by mouth daily      fenofibrate (TRICOR) 54 MG  sensitive [RD]      ED Course User Index  [RD] Devan Miranda W, DO       SEPSIS Reassessment: Sepsis reassessment not applicable    Clinical Management Tools:  Not Applicable    Patient was given the following medications:  Medications   cefTRIAXone (ROCEPHIN) 1,000 mg in sterile water 10 mL IV syringe (has no administration in time range)   ondansetron (ZOFRAN) injection 4 mg (4 mg IntraVENous Given 4/17/24 9564)   sodium chloride 0.9 % bolus 1,000 mL (1,000 mLs IntraVENous New Bag 4/17/24 5455)       CONSULTS: See ED Course/MDM for further details.  None     Social Determinants affecting Diagnosis/Treatment: None    Smoking Cessation: Not Applicable    PROCEDURES   Unless otherwise noted above, none  Procedures      CRITICAL CARE TIME   Patient does not meet Critical Care Time, 0 minutes    ED IMPRESSION     1. General weakness          DISPOSITION/PLAN   DISPOSITION Decision To Admit 04/17/2024 04:52:17 AM    Admit Note: Pt is being admitted by hospitalist. The results of their tests and reason(s) for their admission have been discussed with pt and/or available family. They convey agreement and understanding for the need to be admitted and for the admission diagnosis.     PATIENT REFERRED TO:  No follow-up provider specified.      DISCHARGE MEDICATIONS:     Medication List        ASK your doctor about these medications      acetaminophen 325 MG tablet  Commonly known as: TYLENOL     aspirin 81 MG EC tablet     baclofen 10 MG tablet  Commonly known as: LIORESAL     Cholecalciferol 50 MCG (2000 UT) Tabs     clonazePAM 0.5 MG tablet  Commonly known as: KLONOPIN     cyanocobalamin 1000 MCG tablet     fenofibrate 54 MG tablet  Commonly known as: TRICOR     gabapentin 300 MG capsule  Commonly known as: NEURONTIN     levothyroxine 100 MCG tablet  Commonly known as: SYNTHROID     losartan 25 MG tablet  Commonly known as: COZAAR     lurasidone 60 MG Tabs tablet  Commonly known as: LATUDA     magnesium oxide 400 MG

## 2024-04-17 NOTE — ED TRIAGE NOTES
Patient presents to ED via EMS for generalized weakness & overall ill feeling. Patient states she has felt ill since yesterday morning. Patient denies chest pain / denies difficulty breathing / denies nausea, vomiting, diarrhea.

## 2024-04-17 NOTE — PROGRESS NOTES
Spiritual Care Assessment/Progress Note  [facility]    Name: Estefania Jackson MRN: [id number]    Age: 68 y.o.     Sex: female   Language: English     Date: 4/17/2024            Total Time Calculated: 20 min              Spiritual Assessment begun in [facility] EMERGENCY DEPT  Service Provided For:: Patient  Referral/Consult From:: Nurse  Encounter Overview/Reason : Initial Encounter    Spiritual beliefs:      [x] Involved in a roge tradition/spiritual practice: Mormon     [] Supported by a roge community:      [] Claims no spiritual orientation:      [] Seeking spiritual identity:           [] Adheres to an individual form of spirituality:      [] Not able to assess:                Identified resources for coping and support system:   Support System: Spouse, Children       [] Prayer                  [] Devotional reading               [] Music                  [] Guided Imagery     [] Pet visits                                        [] Other: (COMMENT)     Specific area/focus of visit   Encounter:    Crisis:    Spiritual/Emotional needs: Type: Spiritual Support  Ritual, Rites and Sacraments: Type: Blessings  Grief, Loss, and Adjustments:    Ethics/Mediation:    Behavioral Health:    Palliative Care:    Advance Care Planning:      Plan/Referrals: Other (Comment) ( is available if needed)    Narrative: SA- Spiritual Care Consult visit in ER 16. The patient was present during the visit. The patient expressed her sadness because her  and son were not there with her at this time of visit.  provided a spiritual/emotional support by the ministry of presence and comforting prayer. The patient expressed her appreciation for 's visit and prayer.  informed the patient of  availability. Please, contact the spiritual health team if needed.    Rev. Vicki Jeffery Mdiv.  Chaplain Resident  Anca ceballos : [phone]

## 2024-04-17 NOTE — H&P
Hospitalist Admission Note    NAME: Estefania Jackson   :  1955   MRN:  [id number]     Date/Time:  2024 1:13 PM    Patient PCP: Alea Diaz MD    ______________________________________________________________________  Given the patient's current clinical presentation, I have a high level of concern for decompensation if discharged from the emergency department.  Complex decision making was performed, which includes reviewing the patient's available past medical records, laboratory results, and x-ray films.       My assessment of this patient's clinical condition and my plan of care is as follows.    Assessment / Plan:    AMS  - Secondary to UTI, supportive care    Recurrent UTI  - Prior urine cx grew E. Coli from 3/22  - Empiric coverage with IV ceftriaxone  - Urine cx    Diabetes mellitus  - A1c  - ISS and accu-checks    HTN  - Continue home losartan 25 mg. Will decrease home propranolol dose to 10 mg TID.    HLD  Continue ASA    Hypothyroidism   - Continue Synthroid     KELLY  - Advised to bring home CPAP                   Medical Decision Making:   I personally reviewed labs: CBC, CMP  I personally reviewed imaging: CT head (negative)  Toxic drug monitoring: Lovenox for signs of bleeding with daily CBC  Discussed case with: ED provider. After discussion I am in agreement that acuity of patient's medical condition necessitates hospital stay.      Code Status: FULL  DVT Prophylaxis: Lovenox  GI Prophylaxis: None      Subjective:   CHIEF COMPLAINT: AMS    HISTORY OF PRESENT ILLNESS:     Estefania Jackson is a 68 y.o.  female with PMHx significant for recurrent UTIs, urinary incontinence, diabetes mellitus, hypertension, hyperlipidemia, hypothyroidism, and KELLY presenting to the ED with a primary complaint of generalized weakness and confusion for last 2 days. Son and  at bedside state patient had difficulty ambulating this morning which prompted them to presented to the ED. Patient follows with  Reconciliation, Ar   traZODone (DESYREL) 100 MG tablet Take 1 tablet by mouth nightly 3/21/22   Automatic Reconciliation, Ar   venlafaxine (EFFEXOR XR) 75 MG extended release capsule Take 1 capsule by mouth 3 times daily 3/6/22   Automatic Reconciliation, Ar         Objective:   VITALS:    Patient Vitals for the past 24 hrs:   BP Temp Temp src Pulse Resp SpO2 Height Weight   24 1129 (!) 144/81 -- -- 77 21 93 % -- --   24 1044 -- -- -- 72 21 92 % -- --   24 1043 -- -- -- 72 24 93 % -- --   24 1042 -- -- -- 73 22 90 % -- --   24 1041 -- -- -- 72 19 90 % -- --   24 1040 -- -- -- 70 26 93 % -- --   24 1039 -- -- -- 73 25 91 % -- --   24 1038 -- -- -- 71 17 90 % -- --   24 1037 -- -- -- 70 26 91 % -- --   24 0900 129/79 -- -- 75 12 91 % -- --   24 0845 116/72 -- -- 74 18 95 % -- --   24 0830 128/63 -- -- 77 19 90 % -- --   24 0545 123/83 -- -- 75 26 93 % -- --   24 0513 -- -- -- 75 17 92 % -- --   24 0512 -- -- -- 75 24 (!) 89 % -- --   24 0505 -- -- -- 77 21 (!) 86 % -- --   24 0405 (!) 151/86 98.5 °F (36.9 °C) Oral 77 18 91 % 1.575 m (5' 2\") 84.4 kg (186 lb)       Temp (24hrs), Av.5 °F (36.9 °C), Min:98.5 °F (36.9 °C), Max:98.5 °F (36.9 °C)           Wt Readings from Last 12 Encounters:   24 84.4 kg (186 lb)       Review of Systems   Constitutional:  Negative for chills and fever.   Respiratory:  Negative for cough and shortness of breath.    Cardiovascular:  Negative for chest pain, palpitations and leg swelling.   Gastrointestinal:  Negative for abdominal pain, diarrhea, nausea and vomiting.   Genitourinary:  Positive for frequency. Negative for dysuria.   Musculoskeletal:  Positive for myalgias. Negative for back pain.   Skin:  Negative for rash and wound.   Neurological:  Negative for dizziness and headaches.   Psychiatric/Behavioral:  Positive for confusion.         PHYSICAL EXAM:  Physical

## 2024-04-18 LAB
ANION GAP SERPL CALC-SCNC: 7 MMOL/L (ref 5–15)
BUN SERPL-MCNC: 12 MG/DL (ref 6–20)
BUN/CREAT SERPL: 17 (ref 12–20)
CA-I BLD-MCNC: 9.3 MG/DL (ref 8.5–10.1)
CHLORIDE SERPL-SCNC: 105 MMOL/L (ref 97–108)
CO2 SERPL-SCNC: 25 MMOL/L (ref 21–32)
CREAT SERPL-MCNC: 0.7 MG/DL (ref 0.55–1.02)
ERYTHROCYTE [DISTWIDTH] IN BLOOD BY AUTOMATED COUNT: 12.8 % (ref 11.5–14.5)
GLUCOSE BLD STRIP.AUTO-MCNC: 150 MG/DL (ref 65–100)
GLUCOSE BLD STRIP.AUTO-MCNC: 164 MG/DL (ref 65–100)
GLUCOSE BLD STRIP.AUTO-MCNC: 183 MG/DL (ref 65–100)
GLUCOSE BLD STRIP.AUTO-MCNC: 219 MG/DL (ref 65–100)
GLUCOSE SERPL-MCNC: 149 MG/DL (ref 65–100)
HCT VFR BLD AUTO: 38 % (ref 35–47)
HGB BLD-MCNC: 12.6 G/DL (ref 11.5–16)
MCH RBC QN AUTO: 30.9 PG (ref 26–34)
MCHC RBC AUTO-ENTMCNC: 33.2 G/DL (ref 30–36.5)
MCV RBC AUTO: 93.1 FL (ref 80–99)
NRBC # BLD: 0 K/UL (ref 0–0.01)
NRBC BLD-RTO: 0 PER 100 WBC
PERFORMED BY:: ABNORMAL
PLATELET # BLD AUTO: 226 K/UL (ref 150–400)
PMV BLD AUTO: 10.5 FL (ref 8.9–12.9)
POTASSIUM SERPL-SCNC: 3.8 MMOL/L (ref 3.5–5.1)
RBC # BLD AUTO: 4.08 M/UL (ref 3.8–5.2)
SODIUM SERPL-SCNC: 137 MMOL/L (ref 136–145)
WBC # BLD AUTO: 10.5 K/UL (ref 3.6–11)

## 2024-04-18 PROCEDURE — 97530 THERAPEUTIC ACTIVITIES: CPT

## 2024-04-18 PROCEDURE — 80048 BASIC METABOLIC PNL TOTAL CA: CPT

## 2024-04-18 PROCEDURE — 6370000000 HC RX 637 (ALT 250 FOR IP): Performed by: HOSPITALIST

## 2024-04-18 PROCEDURE — 97162 PT EVAL MOD COMPLEX 30 MIN: CPT

## 2024-04-18 PROCEDURE — 1100000000 HC RM PRIVATE

## 2024-04-18 PROCEDURE — 97165 OT EVAL LOW COMPLEX 30 MIN: CPT

## 2024-04-18 PROCEDURE — 2580000003 HC RX 258: Performed by: STUDENT IN AN ORGANIZED HEALTH CARE EDUCATION/TRAINING PROGRAM

## 2024-04-18 PROCEDURE — 85027 COMPLETE CBC AUTOMATED: CPT

## 2024-04-18 PROCEDURE — 82962 GLUCOSE BLOOD TEST: CPT

## 2024-04-18 PROCEDURE — 97535 SELF CARE MNGMENT TRAINING: CPT

## 2024-04-18 PROCEDURE — 6370000000 HC RX 637 (ALT 250 FOR IP): Performed by: INTERNAL MEDICINE

## 2024-04-18 PROCEDURE — 36415 COLL VENOUS BLD VENIPUNCTURE: CPT

## 2024-04-18 PROCEDURE — 6370000000 HC RX 637 (ALT 250 FOR IP): Performed by: STUDENT IN AN ORGANIZED HEALTH CARE EDUCATION/TRAINING PROGRAM

## 2024-04-18 PROCEDURE — 6360000002 HC RX W HCPCS: Performed by: STUDENT IN AN ORGANIZED HEALTH CARE EDUCATION/TRAINING PROGRAM

## 2024-04-18 RX ADMIN — LURASIDONE HYDROCHLORIDE 60 MG: 20 TABLET, FILM COATED ORAL at 09:02

## 2024-04-18 RX ADMIN — PRIMIDONE 250 MG: 250 TABLET ORAL at 09:02

## 2024-04-18 RX ADMIN — ENOXAPARIN SODIUM 40 MG: 100 INJECTION SUBCUTANEOUS at 09:05

## 2024-04-18 RX ADMIN — Medication 1000 MCG: at 09:03

## 2024-04-18 RX ADMIN — SODIUM CHLORIDE, PRESERVATIVE FREE 10 ML: 5 INJECTION INTRAVENOUS at 09:10

## 2024-04-18 RX ADMIN — TRAZODONE HYDROCHLORIDE 100 MG: 50 TABLET ORAL at 20:33

## 2024-04-18 RX ADMIN — LOSARTAN POTASSIUM 12.5 MG: 25 TABLET, FILM COATED ORAL at 14:55

## 2024-04-18 RX ADMIN — PROPRANOLOL HYDROCHLORIDE 10 MG: 10 TABLET ORAL at 09:03

## 2024-04-18 RX ADMIN — GABAPENTIN 300 MG: 300 CAPSULE ORAL at 14:55

## 2024-04-18 RX ADMIN — GABAPENTIN 300 MG: 300 CAPSULE ORAL at 09:03

## 2024-04-18 RX ADMIN — CLONAZEPAM 0.5 MG: 0.5 TABLET ORAL at 09:03

## 2024-04-18 RX ADMIN — Medication 2000 UNITS: at 09:03

## 2024-04-18 RX ADMIN — SODIUM CHLORIDE, PRESERVATIVE FREE 10 ML: 5 INJECTION INTRAVENOUS at 20:28

## 2024-04-18 RX ADMIN — PROPRANOLOL HYDROCHLORIDE 10 MG: 10 TABLET ORAL at 14:55

## 2024-04-18 RX ADMIN — CLONAZEPAM 0.5 MG: 0.5 TABLET ORAL at 20:28

## 2024-04-18 RX ADMIN — PROPRANOLOL HYDROCHLORIDE 10 MG: 10 TABLET ORAL at 20:28

## 2024-04-18 RX ADMIN — PRIMIDONE 250 MG: 250 TABLET ORAL at 21:50

## 2024-04-18 RX ADMIN — ASPIRIN 81 MG: 81 TABLET, COATED ORAL at 09:03

## 2024-04-18 RX ADMIN — GABAPENTIN 300 MG: 300 CAPSULE ORAL at 20:28

## 2024-04-18 RX ADMIN — PRIMIDONE 250 MG: 250 TABLET ORAL at 16:25

## 2024-04-18 RX ADMIN — RISPERIDONE 1 MG: 1 TABLET, FILM COATED ORAL at 20:28

## 2024-04-18 ASSESSMENT — PAIN SCALES - GENERAL: PAINLEVEL_OUTOF10: 0

## 2024-04-18 NOTE — CARE COORDINATION
04/18/24 1422   Service Assessment   Patient Orientation Alert and Oriented   Cognition Alert   History Provided By Patient   Primary Caregiver Self   Accompanied By/Relationship  and son   Support Systems Spouse/Significant Other;Children   Patient's Healthcare Decision Maker is: Legal Next of Kin   PCP Verified by CM Yes   Last Visit to PCP Within last 3 months   Prior Functional Level Assistance with the following:;Shopping;Mobility;Cooking;Bathing   Current Functional Level Assistance with the following:;Shopping;Mobility;Cooking;Bathing   Can patient return to prior living arrangement Yes   Ability to make needs known: Fair   Family able to assist with home care needs: Yes   Would you like for me to discuss the discharge plan with any other family members/significant others, and if so, who? Yes  ( and son)   Financial Resources Medicare   Social/Functional History   Lives With Spouse;Son   Type of Home House   Home Layout One level   Home Access Stairs to enter with rails   Entrance Stairs - Number of Steps 3   Entrance Stairs - Rails Both   Bathroom Shower/Tub Tub/Shower unit   Bathroom Equipment Shower chair   Home Equipment Walker, rolling   ADL Assistance Needs assistance   Ambulation Assistance Needs assistance   Transfer Assistance Needs assistance   Active  No   Discharge Planning   Type of Residence House   Living Arrangements Spouse/Significant Other;Children   Type of Home Care Services None   Patient expects to be discharged to: House   Services At/After Discharge   Services At/After Discharge Home Health   Mode of Transport at Discharge Other (see comment)  (family)   Condition of Participation: Discharge Planning   The Patient and/or Patient Representative was provided with a Choice of Provider? Patient   The Patient and/Or Patient Representative agree with the Discharge Plan? Yes   Freedom of Choice list was provided with basic dialogue that supports the patient's

## 2024-04-18 NOTE — PLAN OF CARE
safe to be alone, and concern for pt safely navigating or managing the home environment. Current PT DC recommendation snf but patient preferred Home with Home Health Therapy and family assist once medically appropriate.     Start of Session End of Session   SPO2 (%)  98   Heart Rate (BPM)  87     GOALS:    Problem: Physical Therapy - Adult  Goal: By Discharge: Performs mobility at highest level of function for planned discharge setting.  See evaluation for individualized goals.  Description: FUNCTIONAL STATUS PRIOR TO ADMISSION: Patient was modified independent using a rolling walker for functional mobility.    HOME SUPPORT PRIOR TO ADMISSION: The patient lived with  and son and required minimal assistance for ADLs.    Physical Therapy Goals  Initiated 4/18/2024  Pt stated goal: Get better  Pt will be I with LE HEP in 7 days.  Pt will perform bed mobility with Contact Guard Assist in 7 days.  Pt will perform transfers with Contact Guard Assist in 7 days.   Pt will amb 22 feet with LRAD safely with Contact Guard Assist in 7 days.     Pt will verbalize and demonstrate compliance with fall precautions  in 7 days.   Pt will demonstrate improvement in standing/gait balance from fair to good in 7 days.    Outcome: Progressing        PLAN :  Recommendations and Planned Interventions: bed mobility training, transfer training, gait training, therapeutic exercises, patient and family training/education, and therapeutic activities    Frequency/Duration: Patient will be followed by physical therapy:  2-3x/week to address goals.    Recommendation for discharge: (in order for the patient to meet his/her long term goals)  Skilled Nursing Facility patient preferred to go home,agreeable to HH.    IF patient discharges home will need the following DME: continuing to assess with progress       SUBJECTIVE:   Patient stated “I am ok.”    OBJECTIVE DATA SUMMARY:   HISTORY:    Past Medical History:   Diagnosis Date    Arthritis      Strategies;Energy Conservation  Education Method: Demonstration;Verbal;Teach Back  Barriers to Learning: Cognition  Education Outcome: Continued education needed         Thank you for this referral.  Yenny Booth, PT  Minutes: 35

## 2024-04-18 NOTE — PROGRESS NOTES
OCCUPATIONAL THERAPY EVALUATION  Patient: Estefania Jackson (68 y.o. female)  Date: 4/18/2024  Primary Diagnosis: Recurrent urinary tract infection [N39.0]  General weakness [R53.1]  Urinary tract infection in female [N39.0]       Precautions: Fall Risk, General Precautions                Recommendations for nursing mobility: Out of bed to chair for meals, Frequent repositioning to prevent skin breakdown, Use of bed/chair alarm for safety, LE elevation for management of edema, Use of BSC for toileting , AD and gt belt for bed to chair , and Assist x1    In place during session:Peripheral IV, Nasal Cannula 2L, External Catheter, and EKG/telemetry   ASSESSMENT  Pt is a 68 y.o. female presenting to [facility] with PMHx significant for recurrent UTIs, urinary incontinence, diabetes mellitus, hypertension, hyperlipidemia, hypothyroidism, and KELLY presenting to the ED with a primary complaint of generalized weakness and confusion for last 2 days, admitted 4/17/24 and currently being treated for AMS and UTI workup. Pt received semi-supine in bed upon arrival, AXO x4, and agreeable to OT evaluation.     Based on current observations, pt presents with decreased  functional mobility, independence in ADLs, high-level IADLs, strength, sensation, body mechanics, activity tolerance, endurance, safety awareness, balance, posture (see below for objective details and assist levels).     Overall, pt tolerates session fair with no c/o of pain. Pt pleasant and willing to work with therapy upon arrival. Alert and oriented x4, slow to respond to questions throughout session with bouts of lethargy and closing eyes. BUE with tremors and demonstrates pill rolling tremors, pt reports it is hard for her to open things sometimes. Pt completed bed mobility with CGA with head of bed elevated. Pt with poor sitting balance requiring verbal cues and CGA for posterior lean indicating poor static/dynamic sitting balance. Increased lethargy while sitting eob  menorrhagia, dysmenorrhea;  thinks she may have had one ovary removed.    ORTHOPEDIC SURGERY      x4 spine surgeries    ORTHOPEDIC SURGERY  5/28/13    L2-5 DECOMPRESSION AND FUSION POSTERIOR MULTIILEVEL    OTHER SURGICAL HISTORY      laparoscopy - not sure why    UROLOGICAL SURGERY  10/10/14    Urodynamics    UROLOGICAL SURGERY      surgery for incontinence        Expanded or extensive additional review of patient history:   Lives With: Spouse, Son  Type of Home: House  Home Layout: One level  Home Access: Stairs to enter with rails     Entrance Stairs - Rails: Both  Bathroom Shower/Tub: Tub/Shower unit     Bathroom Equipment: Shower chair     Home Equipment: Walker, rolling     Social/Functional History  Lives With: Spouse, Son  Type of Home: House  Home Layout: One level  Home Access: Stairs to enter with rails  Entrance Stairs - Number of Steps: 3  Entrance Stairs - Rails: Both  Bathroom Shower/Tub: Tub/Shower unit  Bathroom Equipment: Shower chair  Home Equipment: Walker, rolling  ADL Assistance: Needs assistance  Ambulation Assistance: Needs assistance  Transfer Assistance: Needs assistance    Hand Dominance: right     EXAMINATION OF PERFORMANCE DEFICITS:    Cognitive/Behavioral Status:  Orientation  Overall Orientation Status: Within Functional Limits  Orientation Level: Oriented X4  Cognition  Overall Cognitive Status: Exceptions  Following Commands: Follows one step commands with increased time;Follows multistep commands with repitition  Attention Span: Difficulty attending to directions  Safety Judgement: Decreased awareness of need for assistance  Problem Solving: Decreased awareness of errors  Insights: Decreased awareness of deficits  Initiation: Requires cues for some  Sequencing: Requires cues for some    Skin: intact    Edema: none noted    Hearing:   Hearing  Hearing: Within functional limits    Vision/Perceptual:          Vision  Vision: Within Functional Limits       Range of Motion:     AROM:

## 2024-04-18 NOTE — PROGRESS NOTES
Prophylaxis: None    Subjective:     Chief Complaint / Reason for Physician Visit    Patient seen and examined at bedside.  Reports feeling generally \"better\" today. Denies any new complaints.  Discussed with RN events overnight.       Objective:     VITALS:   Last 24hrs VS reviewed since prior progress note. Most recent are:  Patient Vitals for the past 24 hrs:   BP Temp Temp src Pulse Resp SpO2   04/18/24 0813 (!) 144/71 99.3 °F (37.4 °C) Oral 79 23 97 %   04/17/24 2316 (!) 153/77 98.2 °F (36.8 °C) Oral 72 17 100 %   04/17/24 1940 (!) 148/71 99 °F (37.2 °C) Oral 74 18 95 %   04/17/24 1733 (!) 147/75 -- -- 77 -- --         Intake/Output Summary (Last 24 hours) at 4/18/2024 1304  Last data filed at 4/18/2024 0314  Gross per 24 hour   Intake --   Output 400 ml   Net -400 ml        I had a face to face encounter and independently examined this patient on 4/18/2024, as outlined below:    Review of Systems   Constitutional:  Negative for chills and fever.   Respiratory:  Negative for cough and shortness of breath.    Cardiovascular:  Negative for chest pain, palpitations and leg swelling.   Gastrointestinal:  Negative for abdominal pain, diarrhea, nausea and vomiting.   Genitourinary:  Negative for frequency or dysuria.   Musculoskeletal:  Positive for myalgias. Negative for back pain.   Skin:  Negative for rash and wound.   Neurological:  Negative for dizziness and headaches.   Psychiatric/Behavioral:  Positive for confusion.          PHYSICAL EXAM:  Physical Exam  Constitutional:       General: She is not in acute distress.     Appearance: She is obese.   HENT:      Head: Normocephalic and atraumatic.   Eyes:      Extraocular Movements: Extraocular movements intact.      Pupils: Pupils are equal, round, and reactive to light.   Cardiovascular:      Rate and Rhythm: Normal rate and regular rhythm.      Heart sounds: No murmur heard.     No friction rub. No gallop.   Pulmonary:      Effort: Pulmonary effort is normal.    HCT 39.6 38.0    226     Recent Labs     04/17/24  0417 04/18/24  0728   * 137   K 4.0 3.8    105   CO2 25 25   BUN 11 12   ALT 44  --        Signed: Hema Turner PA-C

## 2024-04-19 VITALS
SYSTOLIC BLOOD PRESSURE: 145 MMHG | WEIGHT: 186 LBS | RESPIRATION RATE: 18 BRPM | HEART RATE: 86 BPM | HEIGHT: 62 IN | DIASTOLIC BLOOD PRESSURE: 93 MMHG | BODY MASS INDEX: 34.23 KG/M2 | TEMPERATURE: 98.2 F | OXYGEN SATURATION: 96 %

## 2024-04-19 LAB
ANION GAP SERPL CALC-SCNC: 6 MMOL/L (ref 5–15)
BACTERIA SPEC CULT: ABNORMAL
BUN SERPL-MCNC: 11 MG/DL (ref 6–20)
BUN/CREAT SERPL: 17 (ref 12–20)
CA-I BLD-MCNC: 9.4 MG/DL (ref 8.5–10.1)
CHLORIDE SERPL-SCNC: 106 MMOL/L (ref 97–108)
CO2 SERPL-SCNC: 26 MMOL/L (ref 21–32)
COLONY COUNT, CNT: ABNORMAL
CREAT SERPL-MCNC: 0.63 MG/DL (ref 0.55–1.02)
ERYTHROCYTE [DISTWIDTH] IN BLOOD BY AUTOMATED COUNT: 12.8 % (ref 11.5–14.5)
GLUCOSE BLD STRIP.AUTO-MCNC: 144 MG/DL (ref 65–100)
GLUCOSE BLD STRIP.AUTO-MCNC: 211 MG/DL (ref 65–100)
GLUCOSE SERPL-MCNC: 160 MG/DL (ref 65–100)
HCT VFR BLD AUTO: 40.4 % (ref 35–47)
HGB BLD-MCNC: 13.5 G/DL (ref 11.5–16)
Lab: ABNORMAL
MCH RBC QN AUTO: 31.1 PG (ref 26–34)
MCHC RBC AUTO-ENTMCNC: 33.4 G/DL (ref 30–36.5)
MCV RBC AUTO: 93.1 FL (ref 80–99)
NRBC # BLD: 0 K/UL (ref 0–0.01)
NRBC BLD-RTO: 0 PER 100 WBC
PERFORMED BY:: ABNORMAL
PERFORMED BY:: ABNORMAL
PLATELET # BLD AUTO: 234 K/UL (ref 150–400)
PMV BLD AUTO: 10.5 FL (ref 8.9–12.9)
POTASSIUM SERPL-SCNC: 3.7 MMOL/L (ref 3.5–5.1)
RBC # BLD AUTO: 4.34 M/UL (ref 3.8–5.2)
SODIUM SERPL-SCNC: 138 MMOL/L (ref 136–145)
WBC # BLD AUTO: 8.9 K/UL (ref 3.6–11)

## 2024-04-19 PROCEDURE — 80048 BASIC METABOLIC PNL TOTAL CA: CPT

## 2024-04-19 PROCEDURE — 36415 COLL VENOUS BLD VENIPUNCTURE: CPT

## 2024-04-19 PROCEDURE — 6360000002 HC RX W HCPCS: Performed by: STUDENT IN AN ORGANIZED HEALTH CARE EDUCATION/TRAINING PROGRAM

## 2024-04-19 PROCEDURE — 6370000000 HC RX 637 (ALT 250 FOR IP): Performed by: STUDENT IN AN ORGANIZED HEALTH CARE EDUCATION/TRAINING PROGRAM

## 2024-04-19 PROCEDURE — 2580000003 HC RX 258: Performed by: STUDENT IN AN ORGANIZED HEALTH CARE EDUCATION/TRAINING PROGRAM

## 2024-04-19 PROCEDURE — 6370000000 HC RX 637 (ALT 250 FOR IP): Performed by: INTERNAL MEDICINE

## 2024-04-19 PROCEDURE — 85027 COMPLETE CBC AUTOMATED: CPT

## 2024-04-19 PROCEDURE — 82962 GLUCOSE BLOOD TEST: CPT

## 2024-04-19 RX ORDER — CEPHALEXIN 500 MG/1
500 CAPSULE ORAL 2 TIMES DAILY
COMMUNITY

## 2024-04-19 RX ADMIN — LOSARTAN POTASSIUM 12.5 MG: 25 TABLET, FILM COATED ORAL at 09:05

## 2024-04-19 RX ADMIN — ASPIRIN 81 MG: 81 TABLET, COATED ORAL at 09:05

## 2024-04-19 RX ADMIN — ENOXAPARIN SODIUM 40 MG: 100 INJECTION SUBCUTANEOUS at 09:08

## 2024-04-19 RX ADMIN — LURASIDONE HYDROCHLORIDE 60 MG: 20 TABLET, FILM COATED ORAL at 09:05

## 2024-04-19 RX ADMIN — PRIMIDONE 250 MG: 250 TABLET ORAL at 09:05

## 2024-04-19 RX ADMIN — INSULIN LISPRO 2 UNITS: 100 INJECTION, SOLUTION INTRAVENOUS; SUBCUTANEOUS at 12:17

## 2024-04-19 RX ADMIN — Medication 1000 MCG: at 09:05

## 2024-04-19 RX ADMIN — SODIUM CHLORIDE, PRESERVATIVE FREE 10 ML: 5 INJECTION INTRAVENOUS at 09:09

## 2024-04-19 RX ADMIN — CLONAZEPAM 0.5 MG: 0.5 TABLET ORAL at 09:06

## 2024-04-19 RX ADMIN — PROPRANOLOL HYDROCHLORIDE 10 MG: 10 TABLET ORAL at 09:06

## 2024-04-19 RX ADMIN — GABAPENTIN 300 MG: 300 CAPSULE ORAL at 09:05

## 2024-04-19 RX ADMIN — Medication 2000 UNITS: at 09:06

## 2024-04-19 RX ADMIN — LEVOTHYROXINE SODIUM 50 MCG: 0.03 TABLET ORAL at 05:42

## 2024-04-19 NOTE — DISCHARGE INSTR - COC
Continuity of Care Form    Patient Name: Estefania Jackson   :  1955  MRN:  [id number]    Admit date:  2024  Discharge date:  2024    Code Status Order: Full Code   Advance Directives:     Admitting Physician:  Declan Katz MD  PCP: Alea Diaz MD    Discharging Nurse: Ellie GASTON   Discharging Hospital Unit/Room#: 571/01  Discharging Unit Phone Number: [phone]    Emergency Contact:   Extended Emergency Contact Information  Primary Emergency Contact: Brendon Jackson   [facility]  Home Phone: [phone]  Relation: Spouse  Secondary Emergency Contact: Aroldo Jackson  Home Phone: [phone]  Mobile Phone: [phone]  Relation: Child    Past Surgical History:  Past Surgical History:   Procedure Laterality Date    APPENDECTOMY      BREAST LUMPECTOMY      left    CHOLECYSTECTOMY      HYSTERECTOMY, TOTAL ABDOMINAL (CERVIX REMOVED)      menorrhagia, dysmenorrhea;  thinks she may have had one ovary removed.    ORTHOPEDIC SURGERY      x4 spine surgeries    ORTHOPEDIC SURGERY  13    L2-5 DECOMPRESSION AND FUSION POSTERIOR MULTIILEVEL    OTHER SURGICAL HISTORY      laparoscopy - not sure why    UROLOGICAL SURGERY  10/10/14    Urodynamics    UROLOGICAL SURGERY      surgery for incontinence       Immunization History:     There is no immunization history on file for this patient.    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 coli UTI (urinary tract infection) N39.0, B96.20    Recurrent urinary tract infection N39.0       Isolation/Infection:   Isolation            No Isolation          Patient Infection Status       None to display            Nurse Assessment:  Last Vital Signs: BP (!) 145/93   Pulse 86   Temp 98.2 °F (36.8 °C) (Oral)   Resp 18   Ht 1.575 m (5' 2\")   Wt 84.4 kg

## 2024-04-19 NOTE — DISCHARGE SUMMARY
Discharge Summary    Name: Estefania Jackson  [id number]  YOB: 1955 (Age: 68 y.o.)   Date of Admission: 4/17/2024  Date of Discharge: 4/19/2024  Attending Physician: Declan Katz MD    Discharge Diagnosis:   Principal Problem:    Recurrent urinary tract infection  Resolved Problems:    * No resolved hospital problems. *       Consultations:  IP CONSULT TO SPIRITUAL SERVICES      Brief Admission History/Reason for Admission Per Declan Katz MD:       Brief Hospital Course by Main Problems:     Estefania Jackson is a 68 y.o.  female with PMHx significant for recurrent UTIs, urinary incontinence, diabetes mellitus, hypertension, hyperlipidemia, hypothyroidism, and KELLY presenting to the ED with a primary complaint of generalized weakness and confusion for last 2 days. Son and  at bedside state patient had difficulty ambulating this morning which prompted them to presented to the ED. Patient follows with [facility]. Apparently she has been on prophylaxis Keflex 250 mg daily over last 8+ months which her urologist recently advised her to stop on 4/12. Shortly after patient developed symptoms. She denies any fever, chills, cough, SOB, N/V, abdominal pain, or other complaints at this time.  In the ED, hemodynamically stable. Significant initial labs showed WBC 15.4, otherwise unremarkable. Urinalysis with large leukocyte esterase and >100 WBC. Started on IV ceftriaxone in the ED. Empirically started on IV ceftriaxone. Leukocytosis resolved. Urine culture growing E. Coli. Patient has script for Keflex 500 mg BID that she has not started sent by her PCP. Advised to continue PO Keflex at home x5 days with close PCP follow-up. PT/OT recommending SNF, patient refused. Case management to arrange home health.    Discharge Exam:  Patient seen and examined by me on discharge day.  Pertinent Findings:  Patient Vitals for the past 24 hrs:   BP Temp Temp src Pulse Resp SpO2    04/19/24 0858 (!) 145/93 98.2 °F (36.8 °C) Oral 86 18 96 %   04/19/24 0325 133/75 98.4 °F (36.9 °C) Oral 73 17 --   04/18/24 1948 (!) 156/89 99.1 °F (37.3 °C) Oral 79 17 94 %   04/18/24 1624 (!) 152/71 98.6 °F (37 °C) Oral 74 -- 98 %       Gen:    Not in distress  Chest: Clear lungs  CVS:   Regular rate and rhythm.  No edema  Abd:  Soft, not distended, not tender  Neuro: Alert and oriented x3.    Discharge/Recent Laboratory Results:  Recent Labs     04/19/24  0801      K 3.7      CO2 26   BUN 11   CREATININE 0.63   GLUCOSE 160*   CALCIUM 9.4     Recent Labs     04/19/24  0801   HGB 13.5   HCT 40.4   WBC 8.9          Discharge Medications:     Medication List        CONTINUE taking these medications      acetaminophen 325 MG tablet  Commonly known as: TYLENOL     aspirin 81 MG EC tablet     baclofen 10 MG tablet  Commonly known as: LIORESAL     cephALEXin 500 MG capsule  Commonly known as: KEFLEX     Cholecalciferol 50 MCG (2000 UT) Tabs     clonazePAM 0.5 MG tablet  Commonly known as: KLONOPIN     cyanocobalamin 1000 MCG tablet     gabapentin 300 MG capsule  Commonly known as: NEURONTIN     Latuda 60 MG Tabs tablet  Generic drug: lurasidone     levothyroxine 100 MCG tablet  Commonly known as: SYNTHROID     losartan 25 MG tablet  Commonly known as: COZAAR     metFORMIN 500 MG extended release tablet  Commonly known as: GLUCOPHAGE-XR     primidone 250 MG tablet  Commonly known as: MYSOLINE     propranolol 60 MG tablet  Commonly known as: INDERAL     risperiDONE 4 MG tablet  Commonly known as: RISPERDAL     traZODone 100 MG tablet  Commonly known as: DESYREL     venlafaxine 75 MG extended release capsule  Commonly known as: EFFEXOR XR                Disposition: Home health  Code status: FULL  Recommended diet: diabetic diet and low fat, low cholesterol diet  Recommended activity: activity as tolerated  Wound care: None      Follow up with:   PCP : Alea Diaz MD  Follow-up Information        Follow up With Specialties Details Why Contact Info    Alea Diaz MD Internal Medicine Schedule an appointment as soon as possible for a visit in 1 week(s) Follow-up recurrent UTI  Tried calling facility rings busy have patient follow up to make appointment [id number] [facility] [id number] [phone]      [facility] EMERGENCY DEP Emergency Medicine Follow up As needed, If symptoms worsen [address] [facility] [address] [phone]                Total time in minutes spent coordinating this discharge (includes going over instructions, follow-up, prescriptions, and preparing report for sign off to her PCP) :  35 minutes

## 2024-04-19 NOTE — PROGRESS NOTES
Discharge plan of care/case management plan validated with provider discharge order.    Discharge instructions discussed with the patient and family. All concerns addressed at this time. Patient verbalized understanding of discharge instructions.    PIV removed. Patient wheeled downstairs to go home.

## 2024-04-19 NOTE — CARE COORDINATION
DCP: from home with spouse and son; CM to meet with pt to discuss therapy recs for HH    0956: CM met with pt at bedside to discuss HH rec. Pt agreeable and has no preference. Signed choice form placed in pt chart. CM to send HH referrals.     Transition of Care Plan:    RUR: 10%  Prior Level of Functioning: home with family  Disposition: home with family and [facility]  If SNF or IPR: Date FOC offered: n/a  Date FOC received: n/a  Accepting facility: n/a  Date authorization started with reference number: n/a  Date authorization received and expires: n/a  Follow up appointments: unit secretary to setup as needed  DME needed: none  Transportation at discharge: family  IM/IMM Medicare/ letter given: yes 1st IMM  Is patient a [place] and connected with [facility]? no  If yes, was [place] transfer form completed and [facility] notified? N/a  Caregiver Contact: spoke with pt melchor Jackson  Discharge Caregiver contacted prior to discharge? yes  Care Conference needed? no  Barriers to discharge: none

## 2024-04-19 NOTE — PLAN OF CARE
Problem: ABCDS Injury Assessment  Goal: Absence of physical injury  4/18/2024 2124 by Edna Jason RN  Outcome: Progressing  4/18/2024 2123 by Edna Jason RN  Outcome: Progressing     Problem: Skin/Tissue Integrity  Goal: Absence of new skin breakdown  Description: 1.  Monitor for areas of redness and/or skin breakdown  2.  Assess vascular access sites hourly  3.  Every 4-6 hours minimum:  Change oxygen saturation probe site  4.  Every 4-6 hours:  If on nasal continuous positive airway pressure, respiratory therapy assess nares and determine need for appliance change or resting period.  4/18/2024 2124 by Edna Jason RN  Outcome: Progressing  4/18/2024 2123 by Edna Jason RN  Outcome: Progressing     Problem: Safety - Adult  Goal: Free from fall injury  4/18/2024 2124 by Edna Jason RN  Outcome: Progressing  4/18/2024 2123 by Edna Jason RN  Outcome: Progressing     Problem: Discharge Planning  Goal: Discharge to home or other facility with appropriate resources  4/18/2024 2124 by Edna Jason RN  Outcome: Progressing  4/18/2024 2123 by Edna Jason RN  Outcome: Progressing  Flowsheets (Taken 4/18/2024 0813 by Ellie Palma, RN)  Discharge to home or other facility with appropriate resources: Identify barriers to discharge with patient and caregiver     Problem: Physical Therapy - Adult  Goal: By Discharge: Performs mobility at highest level of function for planned discharge setting.  See evaluation for individualized goals.  Description: FUNCTIONAL STATUS PRIOR TO ADMISSION: Patient was modified independent using a rolling walker for functional mobility.    HOME SUPPORT PRIOR TO ADMISSION: The patient lived with  and son and required minimal assistance for ADLs.    Physical Therapy Goals  Initiated 4/18/2024  Pt stated goal: Get better  Pt will be I with LE HEP in 7 days.  Pt will perform bed mobility with Contact Guard Assist in 7 days.  Pt will perform transfers with

## 2024-04-19 NOTE — PLAN OF CARE
Problem: ABCDS Injury Assessment  Goal: Absence of physical injury  Outcome: Progressing     Problem: Skin/Tissue Integrity  Goal: Absence of new skin breakdown  Description: 1.  Monitor for areas of redness and/or skin breakdown  2.  Assess vascular access sites hourly  3.  Every 4-6 hours minimum:  Change oxygen saturation probe site  4.  Every 4-6 hours:  If on nasal continuous positive airway pressure, respiratory therapy assess nares and determine need for appliance change or resting period.  Outcome: Progressing     Problem: Safety - Adult  Goal: Free from fall injury  Outcome: Progressing     Problem: Discharge Planning  Goal: Discharge to home or other facility with appropriate resources  Outcome: Progressing  Flowsheets (Taken 4/18/2024 2743 by Ellie Palma, RN)  Discharge to home or other facility with appropriate resources: Identify barriers to discharge with patient and caregiver     Problem: Physical Therapy - Adult  Goal: By Discharge: Performs mobility at highest level of function for planned discharge setting.  See evaluation for individualized goals.  Description: FUNCTIONAL STATUS PRIOR TO ADMISSION: Patient was modified independent using a rolling walker for functional mobility.    HOME SUPPORT PRIOR TO ADMISSION: The patient lived with  and son and required minimal assistance for ADLs.    Physical Therapy Goals  Initiated 4/18/2024  Pt stated goal: Get better  Pt will be I with LE HEP in 7 days.  Pt will perform bed mobility with Contact Guard Assist in 7 days.  Pt will perform transfers with Contact Guard Assist in 7 days.   Pt will amb 22 feet with LRAD safely with Contact Guard Assist in 7 days.     Pt will verbalize and demonstrate compliance with fall precautions  in 7 days.   Pt will demonstrate improvement in standing/gait balance from fair to good in 7 days.    4/18/2024 1306 by Yenny Booth, PT  Outcome: Progressing     Problem: Occupational Therapy - Adult  Goal: By Discharge:  Performs self-care activities at highest level of function for planned discharge setting.  See evaluation for individualized goals.  Description: FUNCTIONAL STATUS PRIOR TO ADMISSION:  Mod I with adls and functional mobility. Pt reports  assists with IADLs    HOME SUPPORT: The patient lived with  and son and was supervision for mobility.    Occupational Therapy Goals:  Initiated 4/18/2024  Patient/Family stated goal: to get back home  Patient will perform grooming in standing with Modified [place] within 7 day(s).  Patient will perform UB bathing with Modified [place] within 7 day(s).  Patient will perform LB bathing with Modified [place] within 7 day(s).  Patient will perform toilet transfers with Modified [place]  within 7 day(s).  Patient will perform all aspects of toileting with Modified [place] within 7 day(s).  Patient will participate in upper extremity therapeutic exercise/activities with Modified [place]  within 7 day(s).    Patient will utilize energy conservation techniques during functional activities with verbal cues within 7 day(s).    4/18/2024 1329 by Red England, OT  Outcome: Progressing

## 2024-04-21 LAB
BACTERIA SPEC CULT: NORMAL
BACTERIA SPEC CULT: NORMAL
Lab: NORMAL
Lab: NORMAL

## 2024-04-23 LAB
BACTERIA SPEC CULT: NORMAL
BACTERIA SPEC CULT: NORMAL
Lab: NORMAL
Lab: NORMAL

## 2024-05-03 NOTE — PROGRESS NOTES
Physician Progress Note      PATIENT:               FÁTIMA BRASWELL  [facility] #:                  [id number]  :                       1955  ADMIT DATE:       2024 4:01 AM  DISCH DATE:        2024 1:07 PM  RESPONDING  PROVIDER #:        Hema Turner PA-C          QUERY TEXT:    Pt admitted with UTI. Pt noted to have Altered mental status. If possible,   please document in the progress notes and discharge summary if you are   evaluating and / or treating any of the following:    The medical record reflects the following:  Risk Factors: UTI E. coli, DM2.  Clinical Indicators: H&P, IM PN  Noted:  AMS, Secondary to UTI,   supportive care. primary complaint of generalized weakness and confusion for   last 2 days. Psychiatric/Behavioral:  Positive for confusion.  Neuro: Alert and oriented x3.  Ammonia 13.  Urinalysis with large leukocyte esterase and >100 WBC.  Treatment: IV ceftriaxone      Thank you,  Soledad Kirk [facility] CDS  Options provided:  -- Metabolic encephalopathy  -- Other - I will add my own diagnosis  -- Disagree - Not applicable / Not valid  -- Disagree - Clinically unable to determine / Unknown  -- Refer to Clinical Documentation Reviewer    PROVIDER RESPONSE TEXT:    This patient has metabolic encephalopathy.    Query created by: Soledad Kirk on 5/3/2024 2:41 AM      Electronically signed by:  Hema Turner PA-C 5/3/2024 2:03 PM

## 2024-06-12 ENCOUNTER — HOSPITAL ENCOUNTER (EMERGENCY)
Facility: HOSPITAL | Age: 69
Discharge: HOME | End: 2024-06-12
Attending: EMERGENCY MEDICINE
Payer: MEDICARE

## 2024-06-12 VITALS
RESPIRATION RATE: 18 BRPM | HEART RATE: 69 BPM | TEMPERATURE: 98.4 F | SYSTOLIC BLOOD PRESSURE: 159 MMHG | WEIGHT: 185 LBS | DIASTOLIC BLOOD PRESSURE: 79 MMHG | OXYGEN SATURATION: 96 % | BODY MASS INDEX: 34.04 KG/M2 | HEIGHT: 62 IN

## 2024-06-12 DIAGNOSIS — T14.8XXA HEMATOMA: Primary | ICD-10-CM

## 2024-06-12 PROCEDURE — 99282 EMERGENCY DEPT VISIT SF MDM: CPT

## 2024-06-12 ASSESSMENT — LIFESTYLE VARIABLES
HOW MANY STANDARD DRINKS CONTAINING ALCOHOL DO YOU HAVE ON A TYPICAL DAY: PATIENT DOES NOT DRINK
HOW OFTEN DO YOU HAVE A DRINK CONTAINING ALCOHOL: NEVER

## 2024-06-12 ASSESSMENT — PAIN SCALES - GENERAL: PAINLEVEL_OUTOF10: 0

## 2024-06-12 ASSESSMENT — PAIN - FUNCTIONAL ASSESSMENT: PAIN_FUNCTIONAL_ASSESSMENT: 0-10

## 2024-06-13 NOTE — ED TRIAGE NOTES
Pt presents with bruising to left AC area that she is unsure of where it came from. Denies falling or hitting it on anything.

## 2024-06-13 NOTE — ED PROVIDER NOTES
[facility] EMERGENCY DEPT  EMERGENCY DEPARTMENT HISTORY AND PHYSICAL EXAM      Date: 6/12/2024  Patient Name: Estefania Jackson  MRN: [id number]  Birthdate 1955  Date of evaluation: 6/12/2024  Provider: Beatrice Zambrano MD  Note Started: 9:46 PM EDT 6/12/24    HISTORY OF PRESENT ILLNESS     Chief Complaint   Patient presents with    Arm Pain       History Provided By: Patient    HPI: Estefania Jackson is a 69 y.o. female.  Patient presents with a complaint of bruising on her left elbow on antecubital that she noticed this morning.  No obvious injury.  No other bruising or history of easy bruisability.  No, or nosebleed.  No hematuria.  No GI bleeding.  Patient not on anticoagulation.         PAST MEDICAL HISTORY   Past Medical History:  Past Medical History:   Diagnosis Date    Arthritis     Chronic pain     back pain    Chronic pain     fibromyalgia    Diabetes (HCC)     steroid induced per pt    Fibromyalgia     chronic pain    GERD (gastroesophageal reflux disease)     Hypercholesteremia     Hypertension     Hypothyroid     hypothyroid    Migraine     Morbid obesity (HCC)     Other ill-defined conditions(799.89)     extreme dry mouth (due to meds pt stated)    Pernicious anemia     Psychiatric disorder     anxiety and depression    Unspecified sleep apnea     cpap    Urinary incontinence        Past Surgical History:  Past Surgical History:   Procedure Laterality Date    APPENDECTOMY      BREAST LUMPECTOMY  2011    left    CHOLECYSTECTOMY      HYSTERECTOMY, TOTAL ABDOMINAL (CERVIX REMOVED)  1981    menorrhagia, dysmenorrhea;  thinks she may have had one ovary removed.    ORTHOPEDIC SURGERY      x4 spine surgeries    ORTHOPEDIC SURGERY  5/28/13    L2-5 DECOMPRESSION AND FUSION POSTERIOR MULTIILEVEL    OTHER SURGICAL HISTORY      laparoscopy - not sure why    UROLOGICAL SURGERY  10/10/14    Urodynamics    UROLOGICAL SURGERY      surgery for incontinence       Family History:  Family History   Problem Relation Age

## 2024-08-27 ENCOUNTER — APPOINTMENT (OUTPATIENT)
Facility: HOSPITAL | Age: 69
End: 2024-08-27
Payer: MEDICARE

## 2024-08-27 ENCOUNTER — HOSPITAL ENCOUNTER (EMERGENCY)
Facility: HOSPITAL | Age: 69
Discharge: HOME | End: 2024-08-27
Attending: STUDENT IN AN ORGANIZED HEALTH CARE EDUCATION/TRAINING PROGRAM
Payer: MEDICARE

## 2024-08-27 VITALS
RESPIRATION RATE: 18 BRPM | TEMPERATURE: 98 F | SYSTOLIC BLOOD PRESSURE: 135 MMHG | OXYGEN SATURATION: 97 % | BODY MASS INDEX: 33.86 KG/M2 | DIASTOLIC BLOOD PRESSURE: 86 MMHG | HEART RATE: 77 BPM | WEIGHT: 184 LBS | HEIGHT: 62 IN

## 2024-08-27 DIAGNOSIS — S60.222A CONTUSION OF LEFT HAND, INITIAL ENCOUNTER: Primary | ICD-10-CM

## 2024-08-27 PROCEDURE — 99283 EMERGENCY DEPT VISIT LOW MDM: CPT

## 2024-08-27 PROCEDURE — 73110 X-RAY EXAM OF WRIST: CPT

## 2024-08-27 PROCEDURE — 73130 X-RAY EXAM OF HAND: CPT

## 2024-08-27 ASSESSMENT — PAIN - FUNCTIONAL ASSESSMENT: PAIN_FUNCTIONAL_ASSESSMENT: 0-10

## 2024-08-27 ASSESSMENT — PAIN DESCRIPTION - LOCATION: LOCATION: HAND

## 2024-08-27 ASSESSMENT — PAIN SCALES - GENERAL: PAINLEVEL_OUTOF10: 5

## 2024-08-27 ASSESSMENT — PAIN DESCRIPTION - ORIENTATION: ORIENTATION: LEFT

## 2024-08-28 NOTE — ED TRIAGE NOTES
Patient lost balance and fell prior to arrival.     Patient reports landing all of her weight on left hand. Swelling and bruising noted. Not on blood thinners.     800 mg Motrin taken around 8:15 pm

## 2024-08-28 NOTE — ED PROVIDER NOTES
[facility] EMERGENCY DEPARTMENT  EMERGENCY DEPARTMENT HISTORY AND PHYSICAL EXAM      Date: 8/27/2024  Patient Name: Estefania Jackson  MRN: [id number]  Birthdate 1955  Date of evaluation: 8/27/2024  Provider: Sae Mcdaniel MD   Note Started: 3:23 AM EDT 8/28/24    HISTORY OF PRESENT ILLNESS     Chief Complaint   Patient presents with    Hand Injury       History Provided By: Patient    HPI: Estefania Jackson is a 69 y.o. female with past medical history as reviewed below presents for evaluation of left hand pain.  Patient states that she fell while standing, falling backwards and landing on her hand.  Denies any head strike or loss of consciousness.  She has had pain in the hand since then.  No decreased motor or sensory function.    PAST MEDICAL HISTORY   Past Medical History:  Past Medical History:   Diagnosis Date    Arthritis     Chronic pain     back pain    Chronic pain     fibromyalgia    Diabetes (HCC)     steroid induced per pt    Fibromyalgia     chronic pain    GERD (gastroesophageal reflux disease)     Hypercholesteremia     Hypertension     Hypothyroid     hypothyroid    Migraine     Morbid obesity (HCC)     Other ill-defined conditions(799.89)     extreme dry mouth (due to meds pt stated)    Pernicious anemia     Psychiatric disorder     anxiety and depression    Unspecified sleep apnea     cpap    Urinary incontinence        Past Surgical History:  Past Surgical History:   Procedure Laterality Date    APPENDECTOMY      BREAST LUMPECTOMY  2011    left    CHOLECYSTECTOMY      HYSTERECTOMY, TOTAL ABDOMINAL (CERVIX REMOVED)  1981    menorrhagia, dysmenorrhea;  thinks she may have had one ovary removed.    ORTHOPEDIC SURGERY      x4 spine surgeries    ORTHOPEDIC SURGERY  5/28/13    L2-5 DECOMPRESSION AND FUSION POSTERIOR MULTIILEVEL    OTHER SURGICAL HISTORY      laparoscopy - not sure why    UROLOGICAL SURGERY  10/10/14    Urodynamics    UROLOGICAL SURGERY      surgery for incontinence       Family    cyanocobalamin 1000 MCG tablet     gabapentin 300 MG capsule  Commonly known as: NEURONTIN     Latuda 60 MG Tabs tablet  Generic drug: lurasidone     levothyroxine 100 MCG tablet  Commonly known as: SYNTHROID     losartan 25 MG tablet  Commonly known as: COZAAR     metFORMIN 500 MG extended release tablet  Commonly known as: GLUCOPHAGE-XR     primidone 250 MG tablet  Commonly known as: MYSOLINE     propranolol 60 MG tablet  Commonly known as: INDERAL     risperiDONE 4 MG tablet  Commonly known as: RISPERDAL     traZODone 100 MG tablet  Commonly known as: DESYREL     venlafaxine 75 MG extended release capsule  Commonly known as: EFFEXOR XR                DISCONTINUED MEDICATIONS:  Discharge Medication List as of 8/27/2024 10:40 PM          I am the Primary Clinician of Record. Sae Mcdaniel MD (electronically signed)    (Please note that parts of this dictation were completed with voice recognition software. Quite often unanticipated grammatical, syntax, homophones, and other interpretive errors are inadvertently transcribed by the computer software. Please disregards these errors. Please excuse any errors that have escaped final proofreading.)        Sae Mcdaniel MD  08/28/24 0324

## 2025-07-14 ENCOUNTER — HOSPITAL ENCOUNTER (INPATIENT)
Facility: HOSPITAL | Age: 70
LOS: 2 days | Discharge: HOME HEALTH | DRG: 690 | End: 2025-07-16
Attending: INTERNAL MEDICINE | Admitting: INTERNAL MEDICINE
Payer: MEDICARE

## 2025-07-14 DIAGNOSIS — N30.00 ACUTE CYSTITIS WITHOUT HEMATURIA: Primary | ICD-10-CM

## 2025-07-14 DIAGNOSIS — E87.6 HYPOKALEMIA: ICD-10-CM

## 2025-07-14 PROBLEM — N39.0 UTI (URINARY TRACT INFECTION): Status: ACTIVE | Noted: 2025-07-14

## 2025-07-14 LAB
ALBUMIN SERPL-MCNC: 3.8 G/DL (ref 3.5–5)
ALBUMIN/GLOB SERPL: 1.1 (ref 1.1–2.2)
ALP SERPL-CCNC: 72 U/L (ref 45–117)
ALT SERPL-CCNC: 44 U/L (ref 12–78)
ANION GAP SERPL CALC-SCNC: ABNORMAL MMOL/L (ref 2–12)
APPEARANCE UR: ABNORMAL
AST SERPL W P-5'-P-CCNC: 21 U/L (ref 15–37)
BACTERIA URNS QL MICRO: ABNORMAL /HPF
BASOPHILS # BLD: 0.08 K/UL (ref 0–0.1)
BASOPHILS NFR BLD: 0.8 % (ref 0–1)
BILIRUB SERPL-MCNC: 0.3 MG/DL (ref 0.2–1)
BILIRUB UR QL: NEGATIVE
BUN SERPL-MCNC: 10 MG/DL (ref 6–20)
BUN/CREAT SERPL: 11 (ref 12–20)
CA-I BLD-MCNC: 10 MG/DL (ref 8.5–10.1)
CHLORIDE SERPL-SCNC: 102 MMOL/L (ref 97–108)
CO2 SERPL-SCNC: 28 MMOL/L (ref 21–32)
COLOR UR: ABNORMAL
CREAT SERPL-MCNC: 0.88 MG/DL (ref 0.55–1.02)
DIFFERENTIAL METHOD BLD: ABNORMAL
EOSINOPHIL # BLD: 0.43 K/UL (ref 0–0.4)
EOSINOPHIL NFR BLD: 4.1 % (ref 0–7)
EPITH CASTS URNS QL MICRO: ABNORMAL /LPF
ERYTHROCYTE [DISTWIDTH] IN BLOOD BY AUTOMATED COUNT: 12.4 % (ref 11.5–14.5)
GLOBULIN SER CALC-MCNC: 3.4 G/DL (ref 2–4)
GLUCOSE BLD STRIP.AUTO-MCNC: 187 MG/DL (ref 65–100)
GLUCOSE SERPL-MCNC: 93 MG/DL (ref 65–100)
GLUCOSE UR STRIP.AUTO-MCNC: NEGATIVE MG/DL
HCT VFR BLD AUTO: 41.1 % (ref 35–47)
HGB BLD-MCNC: 13.5 G/DL (ref 11.5–16)
HGB UR QL STRIP: ABNORMAL
IMM GRANULOCYTES # BLD AUTO: 0.03 K/UL (ref 0–0.04)
IMM GRANULOCYTES NFR BLD AUTO: 0.3 % (ref 0–0.5)
KETONES UR QL STRIP.AUTO: NEGATIVE MG/DL
LEUKOCYTE ESTERASE UR QL STRIP.AUTO: ABNORMAL
LYMPHOCYTES # BLD: 4.14 K/UL (ref 0.8–3.5)
LYMPHOCYTES NFR BLD: 39.9 % (ref 12–49)
MCH RBC QN AUTO: 31.1 PG (ref 26–34)
MCHC RBC AUTO-ENTMCNC: 32.8 G/DL (ref 30–36.5)
MCV RBC AUTO: 94.7 FL (ref 80–99)
MONOCYTES # BLD: 0.64 K/UL (ref 0–1)
MONOCYTES NFR BLD: 6.2 % (ref 5–13)
NEUTS SEG # BLD: 5.06 K/UL (ref 1.8–8)
NEUTS SEG NFR BLD: 48.7 % (ref 32–75)
NITRITE UR QL STRIP.AUTO: POSITIVE
PERFORMED BY:: ABNORMAL
PH UR STRIP: 6 (ref 5–8)
PLATELET # BLD AUTO: 250 K/UL (ref 150–400)
PMV BLD AUTO: 10.4 FL (ref 8.9–12.9)
POTASSIUM SERPL-SCNC: 3.6 MMOL/L (ref 3.5–5.1)
PROT SERPL-MCNC: 7.2 G/DL (ref 6.4–8.2)
PROT UR STRIP-MCNC: ABNORMAL MG/DL
RBC # BLD AUTO: 4.34 M/UL (ref 3.8–5.2)
RBC #/AREA URNS HPF: ABNORMAL /HPF (ref 0–5)
SODIUM SERPL-SCNC: 124 MMOL/L (ref 136–145)
SP GR UR REFRACTOMETRY: 1.02 (ref 1–1.03)
TSH SERPL DL<=0.05 MIU/L-ACNC: 1.04 UIU/ML (ref 0.36–3.74)
URINE CULTURE IF INDICATED: ABNORMAL
UROBILINOGEN UR QL STRIP.AUTO: 0.1 EU/DL (ref 0.2–1)
WBC # BLD AUTO: 10.4 K/UL (ref 3.6–11)
WBC URNS QL MICRO: >100 /HPF (ref 0–4)

## 2025-07-14 PROCEDURE — 6360000002 HC RX W HCPCS: Performed by: NURSE PRACTITIONER

## 2025-07-14 PROCEDURE — 81001 URINALYSIS AUTO W/SCOPE: CPT

## 2025-07-14 PROCEDURE — 6370000000 HC RX 637 (ALT 250 FOR IP): Performed by: FAMILY MEDICINE

## 2025-07-14 PROCEDURE — 80053 COMPREHEN METABOLIC PANEL: CPT

## 2025-07-14 PROCEDURE — 87086 URINE CULTURE/COLONY COUNT: CPT

## 2025-07-14 PROCEDURE — 87088 URINE BACTERIA CULTURE: CPT

## 2025-07-14 PROCEDURE — 2580000003 HC RX 258: Performed by: INTERNAL MEDICINE

## 2025-07-14 PROCEDURE — 85025 COMPLETE CBC W/AUTO DIFF WBC: CPT

## 2025-07-14 PROCEDURE — 82962 GLUCOSE BLOOD TEST: CPT

## 2025-07-14 PROCEDURE — 83930 ASSAY OF BLOOD OSMOLALITY: CPT

## 2025-07-14 PROCEDURE — 99285 EMERGENCY DEPT VISIT HI MDM: CPT

## 2025-07-14 PROCEDURE — 83036 HEMOGLOBIN GLYCOSYLATED A1C: CPT

## 2025-07-14 PROCEDURE — 36415 COLL VENOUS BLD VENIPUNCTURE: CPT

## 2025-07-14 PROCEDURE — 6370000000 HC RX 637 (ALT 250 FOR IP): Performed by: INTERNAL MEDICINE

## 2025-07-14 PROCEDURE — 2500000003 HC RX 250 WO HCPCS: Performed by: INTERNAL MEDICINE

## 2025-07-14 PROCEDURE — 84443 ASSAY THYROID STIM HORMONE: CPT

## 2025-07-14 PROCEDURE — 2500000003 HC RX 250 WO HCPCS: Performed by: NURSE PRACTITIONER

## 2025-07-14 PROCEDURE — 51798 US URINE CAPACITY MEASURE: CPT

## 2025-07-14 PROCEDURE — 87186 SC STD MICRODIL/AGAR DIL: CPT

## 2025-07-14 PROCEDURE — 1100000000 HC RM PRIVATE

## 2025-07-14 PROCEDURE — 96374 THER/PROPH/DIAG INJ IV PUSH: CPT

## 2025-07-14 RX ORDER — POLYETHYLENE GLYCOL 3350 17 G/17G
17 POWDER, FOR SOLUTION ORAL DAILY PRN
Status: DISCONTINUED | OUTPATIENT
Start: 2025-07-14 | End: 2025-07-16 | Stop reason: HOSPADM

## 2025-07-14 RX ORDER — CLONAZEPAM 0.5 MG/1
0.5 TABLET ORAL 2 TIMES DAILY PRN
Status: DISCONTINUED | OUTPATIENT
Start: 2025-07-14 | End: 2025-07-16 | Stop reason: HOSPADM

## 2025-07-14 RX ORDER — BACLOFEN 10 MG/1
10 TABLET ORAL 2 TIMES DAILY
Status: DISCONTINUED | OUTPATIENT
Start: 2025-07-14 | End: 2025-07-16 | Stop reason: HOSPADM

## 2025-07-14 RX ORDER — GABAPENTIN 300 MG/1
300 CAPSULE ORAL 3 TIMES DAILY
Status: DISCONTINUED | OUTPATIENT
Start: 2025-07-14 | End: 2025-07-16 | Stop reason: HOSPADM

## 2025-07-14 RX ORDER — ATORVASTATIN CALCIUM 10 MG/1
10 TABLET, FILM COATED ORAL NIGHTLY
Status: DISCONTINUED | OUTPATIENT
Start: 2025-07-14 | End: 2025-07-16 | Stop reason: HOSPADM

## 2025-07-14 RX ORDER — SODIUM CHLORIDE 9 MG/ML
INJECTION, SOLUTION INTRAVENOUS PRN
Status: DISCONTINUED | OUTPATIENT
Start: 2025-07-14 | End: 2025-07-16 | Stop reason: HOSPADM

## 2025-07-14 RX ORDER — LANOLIN ALCOHOL/MO/W.PET/CERES
1000 CREAM (GRAM) TOPICAL DAILY
Status: DISCONTINUED | OUTPATIENT
Start: 2025-07-14 | End: 2025-07-16 | Stop reason: HOSPADM

## 2025-07-14 RX ORDER — LOSARTAN POTASSIUM 25 MG/1
25 TABLET ORAL DAILY
Status: DISCONTINUED | OUTPATIENT
Start: 2025-07-14 | End: 2025-07-16 | Stop reason: HOSPADM

## 2025-07-14 RX ORDER — ACETAMINOPHEN 325 MG/1
650 TABLET ORAL EVERY 6 HOURS PRN
Status: DISCONTINUED | OUTPATIENT
Start: 2025-07-14 | End: 2025-07-16 | Stop reason: HOSPADM

## 2025-07-14 RX ORDER — ACETAMINOPHEN 650 MG/1
650 SUPPOSITORY RECTAL EVERY 6 HOURS PRN
Status: DISCONTINUED | OUTPATIENT
Start: 2025-07-14 | End: 2025-07-16 | Stop reason: HOSPADM

## 2025-07-14 RX ORDER — PROPRANOLOL HCL 20 MG
60 TABLET ORAL 3 TIMES DAILY
Status: DISCONTINUED | OUTPATIENT
Start: 2025-07-14 | End: 2025-07-16 | Stop reason: HOSPADM

## 2025-07-14 RX ORDER — TRAZODONE HYDROCHLORIDE 50 MG/1
100 TABLET ORAL NIGHTLY
Status: DISCONTINUED | OUTPATIENT
Start: 2025-07-14 | End: 2025-07-16 | Stop reason: HOSPADM

## 2025-07-14 RX ORDER — ENOXAPARIN SODIUM 100 MG/ML
40 INJECTION SUBCUTANEOUS DAILY
Status: DISCONTINUED | OUTPATIENT
Start: 2025-07-14 | End: 2025-07-16 | Stop reason: HOSPADM

## 2025-07-14 RX ORDER — GLUCAGON 1 MG/ML
1 KIT INJECTION PRN
Status: DISCONTINUED | OUTPATIENT
Start: 2025-07-14 | End: 2025-07-16 | Stop reason: HOSPADM

## 2025-07-14 RX ORDER — POTASSIUM CHLORIDE 7.45 MG/ML
10 INJECTION INTRAVENOUS PRN
Status: DISCONTINUED | OUTPATIENT
Start: 2025-07-14 | End: 2025-07-16 | Stop reason: HOSPADM

## 2025-07-14 RX ORDER — ARIPIPRAZOLE 5 MG/1
5 TABLET ORAL DAILY
Status: DISCONTINUED | OUTPATIENT
Start: 2025-07-14 | End: 2025-07-16 | Stop reason: HOSPADM

## 2025-07-14 RX ORDER — ASPIRIN 81 MG/1
81 TABLET ORAL DAILY
Status: DISCONTINUED | OUTPATIENT
Start: 2025-07-14 | End: 2025-07-16 | Stop reason: HOSPADM

## 2025-07-14 RX ORDER — LURASIDONE HYDROCHLORIDE 80 MG/1
80 TABLET, FILM COATED ORAL EVERY MORNING
Status: DISCONTINUED | OUTPATIENT
Start: 2025-07-15 | End: 2025-07-16 | Stop reason: HOSPADM

## 2025-07-14 RX ORDER — PRIMIDONE 250 MG/1
250 TABLET ORAL 3 TIMES DAILY
Status: DISCONTINUED | OUTPATIENT
Start: 2025-07-14 | End: 2025-07-16 | Stop reason: HOSPADM

## 2025-07-14 RX ORDER — SODIUM CHLORIDE 0.9 % (FLUSH) 0.9 %
5-40 SYRINGE (ML) INJECTION EVERY 12 HOURS SCHEDULED
Status: DISCONTINUED | OUTPATIENT
Start: 2025-07-14 | End: 2025-07-16 | Stop reason: HOSPADM

## 2025-07-14 RX ORDER — ONDANSETRON 2 MG/ML
4 INJECTION INTRAMUSCULAR; INTRAVENOUS EVERY 6 HOURS PRN
Status: DISCONTINUED | OUTPATIENT
Start: 2025-07-14 | End: 2025-07-16 | Stop reason: HOSPADM

## 2025-07-14 RX ORDER — MAGNESIUM SULFATE IN WATER 40 MG/ML
2000 INJECTION, SOLUTION INTRAVENOUS PRN
Status: DISCONTINUED | OUTPATIENT
Start: 2025-07-14 | End: 2025-07-16 | Stop reason: HOSPADM

## 2025-07-14 RX ORDER — VITAMIN B COMPLEX
2000 TABLET ORAL DAILY
Status: DISCONTINUED | OUTPATIENT
Start: 2025-07-14 | End: 2025-07-16 | Stop reason: HOSPADM

## 2025-07-14 RX ORDER — INSULIN LISPRO 100 [IU]/ML
0-16 INJECTION, SOLUTION INTRAVENOUS; SUBCUTANEOUS
Status: DISCONTINUED | OUTPATIENT
Start: 2025-07-14 | End: 2025-07-16 | Stop reason: HOSPADM

## 2025-07-14 RX ORDER — SODIUM CHLORIDE 9 MG/ML
INJECTION, SOLUTION INTRAVENOUS CONTINUOUS
Status: DISCONTINUED | OUTPATIENT
Start: 2025-07-14 | End: 2025-07-15

## 2025-07-14 RX ORDER — SODIUM CHLORIDE 0.9 % (FLUSH) 0.9 %
5-40 SYRINGE (ML) INJECTION PRN
Status: DISCONTINUED | OUTPATIENT
Start: 2025-07-14 | End: 2025-07-16 | Stop reason: HOSPADM

## 2025-07-14 RX ORDER — RISPERIDONE 1 MG/1
4 TABLET ORAL NIGHTLY
Status: DISCONTINUED | OUTPATIENT
Start: 2025-07-14 | End: 2025-07-16 | Stop reason: HOSPADM

## 2025-07-14 RX ORDER — ACETAMINOPHEN 325 MG/1
650 TABLET ORAL EVERY 6 HOURS PRN
Status: DISCONTINUED | OUTPATIENT
Start: 2025-07-14 | End: 2025-07-14

## 2025-07-14 RX ORDER — LEVOTHYROXINE SODIUM 100 UG/1
100 TABLET ORAL
Status: DISCONTINUED | OUTPATIENT
Start: 2025-07-15 | End: 2025-07-16 | Stop reason: HOSPADM

## 2025-07-14 RX ORDER — DEXTROSE MONOHYDRATE 100 MG/ML
INJECTION, SOLUTION INTRAVENOUS CONTINUOUS PRN
Status: DISCONTINUED | OUTPATIENT
Start: 2025-07-14 | End: 2025-07-16 | Stop reason: HOSPADM

## 2025-07-14 RX ORDER — VENLAFAXINE HYDROCHLORIDE 75 MG/1
75 CAPSULE, EXTENDED RELEASE ORAL 3 TIMES DAILY
Status: DISCONTINUED | OUTPATIENT
Start: 2025-07-14 | End: 2025-07-16 | Stop reason: HOSPADM

## 2025-07-14 RX ORDER — ONDANSETRON 4 MG/1
4 TABLET, ORALLY DISINTEGRATING ORAL EVERY 8 HOURS PRN
Status: DISCONTINUED | OUTPATIENT
Start: 2025-07-14 | End: 2025-07-16 | Stop reason: HOSPADM

## 2025-07-14 RX ORDER — POTASSIUM CHLORIDE 1500 MG/1
40 TABLET, EXTENDED RELEASE ORAL PRN
Status: DISCONTINUED | OUTPATIENT
Start: 2025-07-14 | End: 2025-07-16 | Stop reason: HOSPADM

## 2025-07-14 RX ADMIN — POTASSIUM BICARBONATE 40 MEQ: 782 TABLET, EFFERVESCENT ORAL at 16:52

## 2025-07-14 RX ADMIN — VENLAFAXINE HYDROCHLORIDE 75 MG: 75 CAPSULE, EXTENDED RELEASE ORAL at 21:36

## 2025-07-14 RX ADMIN — CLONAZEPAM 0.5 MG: 0.5 TABLET ORAL at 21:36

## 2025-07-14 RX ADMIN — GABAPENTIN 300 MG: 100 CAPSULE ORAL at 16:52

## 2025-07-14 RX ADMIN — PRIMIDONE 250 MG: 250 TABLET ORAL at 21:36

## 2025-07-14 RX ADMIN — CEFTRIAXONE SODIUM 1000 MG: 1 INJECTION, POWDER, FOR SOLUTION INTRAMUSCULAR; INTRAVENOUS at 14:42

## 2025-07-14 RX ADMIN — SODIUM CHLORIDE: 0.9 INJECTION, SOLUTION INTRAVENOUS at 21:32

## 2025-07-14 RX ADMIN — GABAPENTIN 300 MG: 100 CAPSULE ORAL at 21:35

## 2025-07-14 RX ADMIN — BACLOFEN 10 MG: 10 TABLET ORAL at 21:36

## 2025-07-14 RX ADMIN — SODIUM CHLORIDE: 0.9 INJECTION, SOLUTION INTRAVENOUS at 16:45

## 2025-07-14 RX ADMIN — SODIUM CHLORIDE, PRESERVATIVE FREE 10 ML: 5 INJECTION INTRAVENOUS at 21:33

## 2025-07-14 NOTE — ED PROVIDER NOTES
EMERGENCY DEPARTMENT HISTORY AND PHYSICAL EXAM      Date: 7/14/2025  Patient Name: Estefania Jackson    History of Presenting Illness     Chief Complaint   Patient presents with   • Dysuria       History Provided By:     HPI: Estefania Jackson, is a very pleasant 70 y.o. female presenting to the ED with a chief complaint of weakness, dysuria, urinary retention.  She reports symptoms been present for the past month. She has been treated outpatient for UTI x2 with abx.  She reports being concerned that is has reoccurred at this time.   Denies any other symptoms at this time.  She denies any fever, chest pain, shortness of breath, nausea, vomiting, back pain    PCP: Sowmya Marroquin MD    No current facility-administered medications on file prior to encounter.     Current Outpatient Medications on File Prior to Encounter   Medication Sig Dispense Refill   • cephALEXin (KEFLEX) 500 MG capsule Take 1 capsule by mouth 2 times daily     • lurasidone (LATUDA) 60 MG TABS tablet Take 1 tablet by mouth every morning     • acetaminophen (TYLENOL) 325 MG tablet Take 2 tablets by mouth every 6 hours as needed     • aspirin 81 MG EC tablet Take by mouth daily     • baclofen (LIORESAL) 10 MG tablet Take 1 tablet by mouth 2 times daily     • Cholecalciferol 50 MCG (2000 UT) TABS Take 1 tablet by mouth daily     • clonazePAM (KLONOPIN) 0.5 MG tablet Take 1 tablet by mouth 2 times daily as needed for Anxiety.     • cyanocobalamin 1000 MCG tablet Take 1 tablet by mouth daily     • gabapentin (NEURONTIN) 300 MG capsule Take 1 capsule by mouth 3 times daily.     • levothyroxine (SYNTHROID) 100 MCG tablet Take 1 tablet by mouth every morning (before breakfast)     • losartan (COZAAR) 25 MG tablet Take 1 tablet by mouth daily     • metFORMIN (GLUCOPHAGE-XR) 500 MG extended release tablet Take 1 tablet by mouth daily     • primidone (MYSOLINE) 250 MG tablet Take 1 tablet by mouth 3 times daily     • propranolol (INDERAL) 60 MG tablet Take 1  Systems     Negative unless otherwise stated in HPI    Physical Exam     Physical Exam  Vitals and nursing note reviewed.   Constitutional:       General: She is not in acute distress.     Appearance: Normal appearance. She is not ill-appearing or toxic-appearing.   HENT:      Head: Normocephalic and atraumatic.      Nose: Nose normal.   Eyes:      Extraocular Movements: Extraocular movements intact.   Cardiovascular:      Rate and Rhythm: Normal rate and regular rhythm.      Pulses: Normal pulses.      Heart sounds: Normal heart sounds.   Pulmonary:      Effort: Pulmonary effort is normal. No respiratory distress.      Breath sounds: Normal breath sounds.   Abdominal:      Palpations: Abdomen is soft.      Tenderness: There is abdominal tenderness in the suprapubic area. There is no right CVA tenderness, left CVA tenderness or guarding.   Musculoskeletal:         General: Normal range of motion.      Cervical back: Normal range of motion and neck supple.   Skin:     General: Skin is warm and dry.      Capillary Refill: Capillary refill takes less than 2 seconds.   Neurological:      Mental Status: She is alert and oriented to person, place, and time.   Psychiatric:         Mood and Affect: Mood normal.         Behavior: Behavior normal.       Lab and Diagnostic Study Results     Labs -     Recent Results (from the past 12 hours)   Urinalysis with Reflex to Culture    Collection Time: 07/14/25  1:45 PM    Specimen: Urine   Result Value Ref Range    Color, UA Yellow/Straw      Appearance Cloudy (A) Clear      Specific Gravity, UA 1.020 1.003 - 1.030      pH, Urine 6.0 5.0 - 8.0      Protein, UA Trace (A) Negative mg/dL    Glucose, Ur Negative Negative mg/dL    Ketones, Urine Negative Negative mg/dL    Bilirubin, Urine Negative Negative      Blood, Urine Small (A) Negative      Urobilinogen, Urine 0.1 (L) 0.2 - 1.0 EU/dL    Nitrite, Urine Positive (A) Negative      Leukocyte Esterase, Urine Large (A) Negative

## 2025-07-14 NOTE — PROGRESS NOTES
I presented to the ER at [facility] in [place] this afternoon to see Ms. Jackson in Nephrology consultation. Ms Staton' name appeared on the [facility] consult list . However, the patient was still at the freestanding emergency room.    I spoke with the Nursing team here in the ER at [facility] in [place].  The Nursing team was not aware of the estimated time of arrival of Ms. Jackson.    Our team will see Ms. Jackson when she is physically at the [facility] facility in [place].    A work-up for hyponatremia has been ordered.

## 2025-07-14 NOTE — ED NOTES
ED TO INPATIENT SBAR HANDOFF    Patient Name: Estefania Jackson   Preferred Name: Estefania  : 1955  70 y.o.   Family/Caregiver Present: no   Code Status Order: Full Code  PO Status: NPO:No  Telemetry Order: Yes  C-SSRS: Risk of Suicide: No Risk  Sitter no n/a  Restraints:     Sepsis Risk Score Sepsis V2 Risk Score: 4.6    Situation  Chief Complaint   Patient presents with    Dysuria     Brief Description of Patient's Condition: Lower abdominal pain and discomfort, burning with urination x 1 month. Pt reports retention at times. Pt was 8 mL post void bladder scan.  Mental Status: oriented and alert  Arrived from:Home  Imaging:   No orders to display     Abnormal labs:   Abnormal Labs Reviewed   URINALYSIS WITH REFLEX TO CULTURE - Abnormal; Notable for the following components:       Result Value    Appearance Cloudy (*)     Protein, UA Trace (*)     Blood, Urine Small (*)     Urobilinogen, Urine 0.1 (*)     Nitrite, Urine Positive (*)     Leukocyte Esterase, Urine Large (*)     WBC, UA >100 (*)     Epithelial Cells, UA Moderate (*)     BACTERIA, URINE 4+ (*)     Urine Culture if Indicated Urine Culture Ordered (*)     All other components within normal limits   CBC WITH AUTO DIFFERENTIAL - Abnormal; Notable for the following components:    Lymphocytes Absolute 4.14 (*)     Eosinophils Absolute 0.43 (*)     All other components within normal limits   COMPREHENSIVE METABOLIC PANEL - Abnormal; Notable for the following components:    Sodium 124 (*)     BUN/Creatinine Ratio 11 (*)     All other components within normal limits       Background  Allergies:   Allergies   Allergen Reactions    Sulfa Antibiotics Hives and Itching    Ace Inhibitors Other (See Comments)     Causes liver enzymes to elevate     History:   Past Medical History:   Diagnosis Date    Arthritis     Chronic pain     back pain    Chronic pain     fibromyalgia    Diabetes (HCC)     steroid induced per pt    Fibromyalgia     chronic pain    GERD

## 2025-07-14 NOTE — DISCHARGE INSTRUCTIONS
HOSPITALIST DISCHARGE INSTRUCTIONS    NAME: Estefania Jackson   :  1955   MRN:  [id number]     Date/Time:  2025 10:36 AM    ADMIT DATE: 2025   DISCHARGE DATE: 2025         It is important that you take the medication exactly as they are prescribed.   Keep your medication in the bottles provided by the pharmacist and keep a list of the medication names, dosages, and times to be taken in your wallet.   Do not take other medications without consulting your doctor.       What to do at Home    Recommended diet:  cardiac diet    Recommended activity: activity as tolerated      If you have questions regarding the hospital related prescriptions or hospital related issues please call [facility] office at . You can always direct your questions to your primary care doctor if you are unable to reach your hospital physician; your PCP works as an extension of your hospital doctor just like your hospital doctor is an extension of your PCP for your time at the hospital ([facility])    If you experience any of the following symptoms then please call your primary care physician or return to the emergency room if you cannot get hold of your doctor:    Fever, chills, nausea, vomiting, or persistent diarrhea  Worsening weakness or new problems with your speech or balance  Dark stools or visible blood in your stools  New Leg swelling or shortness of breath as these could be signs of a clot    Additional Instructions:      Bring these papers with you to your follow up appointments. The papers will help your doctors be sure to continue the care plan from the hospital.

## 2025-07-15 LAB
ALBUMIN SERPL-MCNC: 4 G/DL (ref 3.5–5)
ALBUMIN/GLOB SERPL: 1.3 (ref 1.1–2.2)
ALP SERPL-CCNC: 79 U/L (ref 45–117)
ALT SERPL-CCNC: 39 U/L (ref 12–78)
ANION GAP SERPL CALC-SCNC: 13 MMOL/L (ref 2–12)
AST SERPL W P-5'-P-CCNC: 18 U/L (ref 15–37)
BASOPHILS # BLD: 0.08 K/UL (ref 0–0.1)
BASOPHILS NFR BLD: 0.7 % (ref 0–1)
BILIRUB SERPL-MCNC: 0.3 MG/DL (ref 0.2–1)
BUN SERPL-MCNC: 9 MG/DL (ref 6–20)
BUN/CREAT SERPL: 12 (ref 12–20)
CA-I BLD-MCNC: 9 MG/DL (ref 8.5–10.1)
CHLORIDE SERPL-SCNC: 103 MMOL/L (ref 97–108)
CO2 SERPL-SCNC: 21 MMOL/L (ref 21–32)
CORTIS AM PEAK SERPL-MCNC: 32.2 UG/DL (ref 4.3–22.45)
CREAT SERPL-MCNC: 0.73 MG/DL (ref 0.55–1.02)
DIFFERENTIAL METHOD BLD: ABNORMAL
EOSINOPHIL # BLD: 0.28 K/UL (ref 0–0.4)
EOSINOPHIL NFR BLD: 2.3 % (ref 0–7)
ERYTHROCYTE [DISTWIDTH] IN BLOOD BY AUTOMATED COUNT: 12.3 % (ref 11.5–14.5)
EST. AVERAGE GLUCOSE BLD GHB EST-MCNC: 131 MG/DL
GLOBULIN SER CALC-MCNC: 3.2 G/DL (ref 2–4)
GLUCOSE BLD STRIP.AUTO-MCNC: 134 MG/DL (ref 65–100)
GLUCOSE BLD STRIP.AUTO-MCNC: 145 MG/DL (ref 65–100)
GLUCOSE BLD STRIP.AUTO-MCNC: 155 MG/DL (ref 65–100)
GLUCOSE BLD STRIP.AUTO-MCNC: 170 MG/DL (ref 65–100)
GLUCOSE SERPL-MCNC: 146 MG/DL (ref 65–100)
HBA1C MFR BLD: 6.2 % (ref 4–5.6)
HCT VFR BLD AUTO: 43 % (ref 35–47)
HGB BLD-MCNC: 14.4 G/DL (ref 11.5–16)
IMM GRANULOCYTES # BLD AUTO: 0.06 K/UL (ref 0–0.04)
IMM GRANULOCYTES NFR BLD AUTO: 0.5 % (ref 0–0.5)
LYMPHOCYTES # BLD: 3.37 K/UL (ref 0.8–3.5)
LYMPHOCYTES NFR BLD: 27.6 % (ref 12–49)
MCH RBC QN AUTO: 31.4 PG (ref 26–34)
MCHC RBC AUTO-ENTMCNC: 33.5 G/DL (ref 30–36.5)
MCV RBC AUTO: 93.7 FL (ref 80–99)
MONOCYTES # BLD: 0.5 K/UL (ref 0–1)
MONOCYTES NFR BLD: 4.1 % (ref 5–13)
MRSA DNA SPEC QL NAA+PROBE: NOT DETECTED
NEUTS SEG # BLD: 7.92 K/UL (ref 1.8–8)
NEUTS SEG NFR BLD: 64.8 % (ref 32–75)
NRBC # BLD: 0 K/UL (ref 0–0.01)
NRBC BLD-RTO: 0 PER 100 WBC
OSMOLALITY SERPL: 300 MOSM/KG H2O
PERFORMED BY:: ABNORMAL
PLATELET # BLD AUTO: 280 K/UL (ref 150–400)
PMV BLD AUTO: 10.4 FL (ref 8.9–12.9)
POTASSIUM SERPL-SCNC: 4.2 MMOL/L (ref 3.5–5.1)
PROT SERPL-MCNC: 7.2 G/DL (ref 6.4–8.2)
RBC # BLD AUTO: 4.59 M/UL (ref 3.8–5.2)
SODIUM SERPL-SCNC: 135 MMOL/L (ref 136–145)
SODIUM SERPL-SCNC: 137 MMOL/L (ref 136–145)
WBC # BLD AUTO: 12.2 K/UL (ref 3.6–11)

## 2025-07-15 PROCEDURE — 97161 PT EVAL LOW COMPLEX 20 MIN: CPT

## 2025-07-15 PROCEDURE — 87641 MR-STAPH DNA AMP PROBE: CPT

## 2025-07-15 PROCEDURE — 82962 GLUCOSE BLOOD TEST: CPT

## 2025-07-15 PROCEDURE — 1100000000 HC RM PRIVATE

## 2025-07-15 PROCEDURE — 2580000003 HC RX 258: Performed by: INTERNAL MEDICINE

## 2025-07-15 PROCEDURE — 84295 ASSAY OF SERUM SODIUM: CPT

## 2025-07-15 PROCEDURE — 2500000003 HC RX 250 WO HCPCS: Performed by: INTERNAL MEDICINE

## 2025-07-15 PROCEDURE — 6360000002 HC RX W HCPCS: Performed by: INTERNAL MEDICINE

## 2025-07-15 PROCEDURE — 82533 TOTAL CORTISOL: CPT

## 2025-07-15 PROCEDURE — 6370000000 HC RX 637 (ALT 250 FOR IP): Performed by: INTERNAL MEDICINE

## 2025-07-15 PROCEDURE — 6370000000 HC RX 637 (ALT 250 FOR IP): Performed by: FAMILY MEDICINE

## 2025-07-15 PROCEDURE — 6360000002 HC RX W HCPCS

## 2025-07-15 PROCEDURE — 85025 COMPLETE CBC W/AUTO DIFF WBC: CPT

## 2025-07-15 PROCEDURE — 97530 THERAPEUTIC ACTIVITIES: CPT

## 2025-07-15 PROCEDURE — 80053 COMPREHEN METABOLIC PANEL: CPT

## 2025-07-15 PROCEDURE — 6370000000 HC RX 637 (ALT 250 FOR IP)

## 2025-07-15 RX ORDER — HYDRALAZINE HYDROCHLORIDE 20 MG/ML
10 INJECTION INTRAMUSCULAR; INTRAVENOUS EVERY 6 HOURS PRN
Status: DISCONTINUED | OUTPATIENT
Start: 2025-07-15 | End: 2025-07-16 | Stop reason: HOSPADM

## 2025-07-15 RX ADMIN — VENLAFAXINE HYDROCHLORIDE 75 MG: 75 CAPSULE, EXTENDED RELEASE ORAL at 20:49

## 2025-07-15 RX ADMIN — HYDRALAZINE HYDROCHLORIDE 10 MG: 20 INJECTION INTRAMUSCULAR; INTRAVENOUS at 02:48

## 2025-07-15 RX ADMIN — PRIMIDONE 250 MG: 250 TABLET ORAL at 13:28

## 2025-07-15 RX ADMIN — CYANOCOBALAMIN TAB 1000 MCG 1000 MCG: 1000 TAB at 09:09

## 2025-07-15 RX ADMIN — ENOXAPARIN SODIUM 40 MG: 100 INJECTION SUBCUTANEOUS at 09:10

## 2025-07-15 RX ADMIN — BACLOFEN 10 MG: 10 TABLET ORAL at 09:09

## 2025-07-15 RX ADMIN — VENLAFAXINE HYDROCHLORIDE 75 MG: 75 CAPSULE, EXTENDED RELEASE ORAL at 09:09

## 2025-07-15 RX ADMIN — PROPRANOLOL HYDROCHLORIDE 60 MG: 20 TABLET ORAL at 20:50

## 2025-07-15 RX ADMIN — CEFTRIAXONE SODIUM 1000 MG: 1 INJECTION, POWDER, FOR SOLUTION INTRAMUSCULAR; INTRAVENOUS at 13:28

## 2025-07-15 RX ADMIN — GABAPENTIN 300 MG: 100 CAPSULE ORAL at 20:49

## 2025-07-15 RX ADMIN — SODIUM CHLORIDE: 0.9 INJECTION, SOLUTION INTRAVENOUS at 06:41

## 2025-07-15 RX ADMIN — ASPIRIN 81 MG: 81 TABLET, DELAYED RELEASE ORAL at 09:09

## 2025-07-15 RX ADMIN — PRIMIDONE 250 MG: 250 TABLET ORAL at 09:09

## 2025-07-15 RX ADMIN — PROPRANOLOL HYDROCHLORIDE 60 MG: 20 TABLET ORAL at 10:56

## 2025-07-15 RX ADMIN — GABAPENTIN 300 MG: 100 CAPSULE ORAL at 13:28

## 2025-07-15 RX ADMIN — CLONAZEPAM 0.5 MG: 0.5 TABLET ORAL at 20:48

## 2025-07-15 RX ADMIN — ARIPIPRAZOLE 5 MG: 5 TABLET ORAL at 09:09

## 2025-07-15 RX ADMIN — Medication 5 MG: at 02:48

## 2025-07-15 RX ADMIN — ACETAMINOPHEN 650 MG: 325 TABLET ORAL at 04:14

## 2025-07-15 RX ADMIN — Medication 2000 UNITS: at 09:09

## 2025-07-15 RX ADMIN — LURASIDONE HYDROCHLORIDE 80 MG: 80 TABLET, FILM COATED ORAL at 10:56

## 2025-07-15 RX ADMIN — PRIMIDONE 250 MG: 250 TABLET ORAL at 20:49

## 2025-07-15 RX ADMIN — VENLAFAXINE HYDROCHLORIDE 75 MG: 75 CAPSULE, EXTENDED RELEASE ORAL at 13:28

## 2025-07-15 RX ADMIN — TRAZODONE HYDROCHLORIDE 100 MG: 50 TABLET ORAL at 20:48

## 2025-07-15 RX ADMIN — BACLOFEN 10 MG: 10 TABLET ORAL at 20:49

## 2025-07-15 RX ADMIN — ACETAMINOPHEN 650 MG: 325 TABLET ORAL at 10:56

## 2025-07-15 RX ADMIN — GABAPENTIN 300 MG: 100 CAPSULE ORAL at 09:08

## 2025-07-15 ASSESSMENT — PAIN DESCRIPTION - DESCRIPTORS
DESCRIPTORS: TENDER;THROBBING
DESCRIPTORS: OTHER (COMMENT)
DESCRIPTORS: DISCOMFORT;THROBBING
DESCRIPTORS: ACHING

## 2025-07-15 ASSESSMENT — PAIN SCALES - GENERAL
PAINLEVEL_OUTOF10: 2
PAINLEVEL_OUTOF10: 3
PAINLEVEL_OUTOF10: 8
PAINLEVEL_OUTOF10: 3
PAINLEVEL_OUTOF10: 4
PAINLEVEL_OUTOF10: 4

## 2025-07-15 ASSESSMENT — PAIN DESCRIPTION - LOCATION
LOCATION: HEAD

## 2025-07-15 ASSESSMENT — PAIN - FUNCTIONAL ASSESSMENT: PAIN_FUNCTIONAL_ASSESSMENT: ACTIVITIES ARE NOT PREVENTED

## 2025-07-15 ASSESSMENT — PAIN DESCRIPTION - ORIENTATION
ORIENTATION: LEFT;RIGHT
ORIENTATION: LEFT;RIGHT

## 2025-07-15 NOTE — CARE COORDINATION
07/15/25 1601   Service Assessment   Patient Orientation Alert and Oriented;Person;Place;Situation;Self   Cognition Alert   History Provided By Patient;Spouse;Child/Family   Primary Caregiver Family   Accompanied By/Relationship  and son at bedside   Support Systems Spouse/Significant Other;Children   Patient's Healthcare Decision Maker is: Legal Next of Kin   PCP Verified by CM Yes   Last Visit to PCP Within last year   Prior Functional Level Assistance with the following:;Bathing;Dressing;Toileting;Mobility   Can patient return to prior living arrangement Yes   Ability to make needs known: Good   Family able to assist with home care needs: Yes   Would you like for me to discuss the discharge plan with any other family members/significant others, and if so, who? Yes  ( Brendon Jackson)   Social/Functional History   Lives With Spouse   Type of Home House   Home Layout One level   Home Access Stairs to enter with rails   Entrance Stairs - Number of Steps 3   Active  No     CM spoke with patient;  and son at bedside. Patient needs assistance with ADLs. Patient stated he has had HH services with [facility] in the past. Family will transport patient at discharge.     Pharmacy: [facility], [place]  Advance Care Planning   Healthcare Decision Maker:    Primary Decision Maker: Brendon Jackson - Spouse - [phone]    Click here to complete Healthcare Decision Makers including selection of the Healthcare Decision Maker Relationship (ie \"Primary\").

## 2025-07-15 NOTE — H&P
Hospitalist Admission Note    NAME: Estefania Jackson   :  1955   MRN:  [id number]     Date/Time:  2025 8:27 PM    Patient PCP: Sowmya Marroquin MD    ______________________________________________________________________  Given the patient's current clinical presentation, I have a high level of concern for decompensation if discharged from the emergency department.  Complex decision making was performed, which includes reviewing the patient's available past medical records, laboratory results, and x-ray films.       My assessment of this patient's clinical condition and my plan of care is as follows.    Assessment / Plan:      Complicated UTI  Hyponatremia  Type 2 diabetes  History of hypertension  History of CVA with right-sided weakness  Fibromyalgia  Chronic back pain  Depression anxiety      Patient admitted to medical telemetry floor  After cultures started on IV Rocephin  Continue IV fluids  Continue Abilify 5 mg daily  Aspirin 81 mg  Lipitor 10 mg daily  Baclofen 10 mg twice a day  Lovenox 40 subcu daily  Gabapentin 300 mg 3 times  Levothyroxine 100 mcg daily  Latuda 80 mg daily  Primidone 250 mg 3 times a day  Propranolol 60 mg 3 times a day  Effexor 75 mg 3 times a day  B12 1000 mg daily                        Medical Decision Making:   I personally reviewed labs: CBC CMP  I personally reviewed imaging: X-ray  Toxic drug monitoring: None  Discussed case with: ED provider. After discussion I am in agreement that acuity of patient's medical condition necessitates hospital stay.      Code Status: Full  DVT Prophylaxis: Lovenox  GI Prophylaxis: None      Subjective:   CHIEF COMPLAINT: Dysuria    HISTORY OF PRESENT ILLNESS:     Estefania Jackson is a 70 y.o.  female with PMHx significant for diabetes hypertension fibromyalgia CVA with status residual weakness came to the ER because of dysuria and frequency UA came back positive for UTI but blood work shows hyponatremia sodium of 124 patient was  1.1 1.1 - 2.2     TSH    Collection Time: 07/14/25  2:00 PM   Result Value Ref Range    TSH, 3rd Generation 1.04 0.36 - 3.74 uIU/mL         Imaging Results (most recent):  No results found.      _______________________________________________________________________    TOTAL TIME:  70 Minutes    Critical Care Provided     Minutes non procedure based    Signed: Bud Mccloud MD    Procedures: see electronic medical records for all procedures/Xrays and details which were not copied into this note but were reviewed prior to creation of Plan.

## 2025-07-15 NOTE — CONSULTS
[facility] Renal Consult Note      NAME:  Estefania Jackson   :   1955   MRN:  [id number]     Requesting Physician Reji Tong MD   Reason for Consult:  Hyponatremia     PCP:  Sowmya Marroquin MD     Date/Time:  7/15/2025 7:27 PM          Subjective:     CHIEF COMPLAINT: Weakness    HISTORY OF PRESENT ILLNESS:     Ms. Jackson is a 70 y.o.   female who is admitted to the medical service with weakness, dysuria.  We are asked to evaluate for hyponatremia.    Patient presented to the outpatient emergency room with weakness.  Found to have a serum sodium of 124.  Treated with normal saline.  Sodium was up to 137 today.  Magnesium normal.  Potassium was corrected.    She admits to drinking a fair amount of fluids during the day.  Also admits to having a good appetite.  Despite the rapid increase in serum sodium patient had no neurologic complaints.  No tremor, seizures or altered mental status.  Able to walk to the bathroom.  Weakness has resolved.  Past Medical History:   Diagnosis Date    Arthritis     Chronic pain     back pain    Chronic pain     fibromyalgia    Diabetes (HCC)     steroid induced per pt    Fibromyalgia     chronic pain    GERD (gastroesophageal reflux disease)     Hypercholesteremia     Hypertension     Hypothyroid     hypothyroid    Migraine     Morbid obesity (HCC)     Other ill-defined conditions(799.89)     extreme dry mouth (due to meds pt stated)    Pernicious anemia     Psychiatric disorder     anxiety and depression    Unspecified sleep apnea     cpap    Urinary incontinence         Past Surgical History:   Procedure Laterality Date    APPENDECTOMY      BREAST LUMPECTOMY      left    CHOLECYSTECTOMY      HYSTERECTOMY, TOTAL ABDOMINAL (CERVIX REMOVED)      menorrhagia, dysmenorrhea;  thinks she may have had one ovary removed.    ORTHOPEDIC SURGERY      x4 spine surgeries    ORTHOPEDIC SURGERY  13    L2-5 DECOMPRESSION AND FUSION POSTERIOR  Reconciliation, Ar   levothyroxine (SYNTHROID) 100 MCG tablet Take 1 tablet by mouth every morning (before breakfast)   Yes Automatic Reconciliation, Ar   losartan (COZAAR) 25 MG tablet Take 1 tablet by mouth daily 1/2/22  Yes Automatic Reconciliation, Ar   metFORMIN (GLUCOPHAGE-XR) 500 MG extended release tablet Take 1 tablet by mouth daily 3/12/22  Yes Automatic Reconciliation, Ar   primidone (MYSOLINE) 250 MG tablet Take 1 tablet by mouth 3 times daily 1/31/22  Yes Automatic Reconciliation, Ar   propranolol (INDERAL) 60 MG tablet Take 1 tablet by mouth 3 times daily 12/20/21  Yes Automatic Reconciliation, Ar   risperiDONE (RISPERDAL) 4 MG tablet Take 1 tablet by mouth nightly 12/31/21  Yes Automatic Reconciliation, Ar   traZODone (DESYREL) 100 MG tablet Take 1 tablet by mouth nightly 3/21/22  Yes Automatic Reconciliation, Ar   venlafaxine (EFFEXOR XR) 75 MG extended release capsule Take 1 capsule by mouth 3 times daily 3/6/22  Yes Automatic Reconciliation, Ar         Current Facility-Administered Medications:     hydrALAZINE (APRESOLINE) injection 10 mg, 10 mg, IntraVENous, Q6H PRN, Kinsey Ryan PA-C, 10 mg at 07/15/25 0248    melatonin tablet 5 mg, 5 mg, Oral, Nightly PRN, Kinsey Ryan PA-C, 5 mg at 07/15/25 0248    cefTRIAXone (ROCEPHIN) 1,000 mg in sterile water 10 mL IV syringe, 1,000 mg, IntraVENous, Q24H, Mary Onofre MD, 1,000 mg at 07/15/25 1328    aspirin EC tablet 81 mg, 81 mg, Oral, Daily, Mary Onofre MD, 81 mg at 07/15/25 0909    baclofen (LIORESAL) tablet 10 mg, 10 mg, Oral, BID, Mary Onofre MD, 10 mg at 07/15/25 0909    Vitamin D (CHOLECALCIFEROL) tablet 2,000 Units, 2,000 Units, Oral, Daily, Mary Onofre MD, 2,000 Units at 07/15/25 0909    clonazePAM (KLONOPIN) tablet 0.5 mg, 0.5 mg, Oral, BID PRN, Mary Onofre MD, 0.5 mg at 07/14/25 2136    vitamin B-12 (CYANOCOBALAMIN) tablet 1,000 mcg, 1,000 mcg, Oral,

## 2025-07-15 NOTE — PLAN OF CARE
Problem: Chronic Conditions and Co-morbidities  Goal: Patient's chronic conditions and co-morbidity symptoms are monitored and maintained or improved  7/15/2025 1059 by Megan Sutton, RN  Outcome: Progressing  7/15/2025 0517 by VAZQUEZ Peterson, RN  Outcome: Progressing     Problem: Discharge Planning  Goal: Discharge to home or other facility with appropriate resources  Outcome: Progressing     Problem: Safety - Adult  Goal: Free from fall injury  Outcome: Progressing     Problem: Pain  Goal: Verbalizes/displays adequate comfort level or baseline comfort level  Outcome: Progressing

## 2025-07-15 NOTE — PROGRESS NOTES
Hospitalist Progress Note    NAME: Estefania Jackson   : 1955   MRN: [id number]     Date/Time: 7/15/2025 2:37 PM  Patient PCP: Sowmya Marroquin MD    Assessment / Plan:     UTI  - UA greater than 100 WBC, 4+ bacteria, large LE  - Urine culture: Preliminary GNR  - Continue empiric IV Rocephin    Hyponatremia  - -->137  -Suspect was hypovolemic.  Patient denies taking a diuretic  -Urine sodium and osmolarity was requested but still pending  -Continue normal saline IVF but decrease rate    DM2  - Sliding scale insulin as needed    Hypothyroidism  - Continue Synthroid    History of CVA with right-sided weakness  -Continue aspirin and Lipitor    Fibromyalgia  Chronic back pain  Essential tremors  History of recurrent falls  Anxiety and depression  -Continue PTA meds  -Can restart trazodone at at bedtime.  Sodium has normalized, not concerned for SIADH  - PT OT eval and treat        Medical Decision Making:    [x] High (any 2)     A. Problems (any 1)  [x] Acute/Chronic Illness/injury posing threat to life or bodily function: Complicated UTI, hyponatremia  [] Severe exacerbation of chronic illness:    ---------------------------------------------------------------------  B. Risk of Treatment (any 1)   [x] Drugs/treatments that require intensive monitoring for toxicity include:    [x] IV ABX requiring serial renal monitoring for nephrotoxicity:     [] IV Narcotic analgesia for adverse drug reaction  [] Aggressive IV diuresis requiring serial monitoring for renal impairment and electrolyte derangements  [] Critical electrolyte abnormalities requiring IV replacement and close serial monitoring  [] Insulin - monitoring serial FSBS for Hypoglycemic adverse drug reaction  [] Other -   [] Change in code status:    [] Decision to escalate care:    [] Major surgery/procedure with associated risk factors:    ----------------------------------------------------------------------  C. Data (any 2)  [] Discussed current  manager, nursing, pharmacist and clinical coordinator.  Patient's plan of care was discussed; medications were reviewed and discharge planning was addressed.     ________________________________________________________________________  Total NON critical care TIME:   35  Minutes    Total CRITICAL CARE TIME Spent:   Minutes non procedure based      Comments   >50% of visit spent in counseling and coordination of care x     This includes time during multidisciplinary rounds if indicated above   ________________________________________________________________________  Reji Tong MD     Procedures: see electronic medical records for all procedures/Xrays and details which were not copied into this note but were reviewed prior to creation of Plan.      LABS:  I reviewed today's most current labs and imaging studies.  Pertinent labs include:  Recent Labs     07/14/25  1400 07/15/25  0805   WBC 10.4 12.2*   HGB 13.5 14.4   HCT 41.1 43.0    280     Recent Labs     07/14/25  1400 07/15/25  0805   * 137   K 3.6 4.2    103   CO2 28 21   BUN 10 9   ALT 44 39

## 2025-07-15 NOTE — PROGRESS NOTES
4 Eyes Skin Assessment     NAME:  Estefania Jackson  YOB: 1955  MEDICAL RECORD NUMBER:  [id number]    The patient is being assessed for  Admission    I agree that at least one RN has performed a thorough Head to Toe Skin Assessment on the patient. ALL assessment sites listed below have been assessed.      Areas assessed by both nurses:    Head, Face, Ears, Shoulders, Back, Chest, Arms, Elbows, Hands, Sacrum. Buttock, Coccyx, Ischium, and Legs. Feet and Heels        Does the Patient have a Wound? No noted wound(s)       Enio Prevention initiated by RN: Yes  Wound Care Orders initiated by RN: No    Pressure Injury (Stage 1,2,3,4, Unstageable, DTI, NWPT, and Complex wounds) if present, place Wound referral order by RN under : No    New Ostomies, if present place, Ostomy referral order under : No     Nurse 1 eSignature: Electronically signed by Â Jodi Peterson RN on 7/15/25 at 5:36 AM EDT    **SHARE this note so that the co-signing nurse can place an eSignature**    Nurse 2 eSignature: Electronically signed by Â Alka Bermeo RN on 7/15/25 at 7:29 AM EDT

## 2025-07-15 NOTE — PLAN OF CARE
PHYSICAL THERAPY EVALUATION  Patient: Estefania Jackson (70 y.o. female)  Date: 7/15/2025  Primary Diagnosis: Hypokalemia [E87.6]  UTI (urinary tract infection) [N39.0]  Acute cystitis without hematuria [N30.00]       Precautions:                        Recommendations for nursing mobility: Encourage HEP in prep for ADLs/mobility; see handout for details, Frequent repositioning to prevent skin breakdown, Use of bed/chair alarm for safety, LE elevation for management of edema, Use of BSC for toileting , and Amb to bathroom with AD and gait belt    In place during session: Peripheral IV, External Catheter, and EKG/telemetry     ASSESSMENT  Pt is a 70 y.o. female admitted on 7/14/2025 for dysuria and frequency UA came back positive for UTI but blood work shows hyponatremia sodium of 124 patient was recommended to be admitted for hyponatremia and UTI;  Pt semi supine upon PT arrival, agreeable to evaluation. Pt A&O x 4.  Patient lives with family and was requiring assist for everything what she does as per patient,yet able to ambulate indep.sodium 137 today    Based on the objective data described below, the patient currently presents with impaired ability to perform high-level IADLs, impaired strength, decreased activity tolerance, poor safety awareness, impaired balance, and impaired posture. (See below for objective details and assist levels).     Overall pt tolerated session good today with PT. Pt required MI for bed mobility and transfers. Pt amb 5 feet with RW, gt belt and cg/min assist; demonstrates HA,gen weakness. Pt will benefit from continued skilled PT to address above deficits and return to PLOF. Current PT DC recommendation Intermittent physical therapy up to 2-3x/week in previous living setting with additional support needed for ADLs once medically appropriate.      GOALS:    Problem: Physical Therapy - Adult  Goal: By Discharge: Performs mobility at highest level of function for planned discharge setting.

## 2025-07-16 VITALS
HEART RATE: 67 BPM | RESPIRATION RATE: 16 BRPM | HEIGHT: 62 IN | SYSTOLIC BLOOD PRESSURE: 165 MMHG | TEMPERATURE: 98.1 F | DIASTOLIC BLOOD PRESSURE: 96 MMHG | BODY MASS INDEX: 31.73 KG/M2 | OXYGEN SATURATION: 96 % | WEIGHT: 172.4 LBS

## 2025-07-16 LAB
ANION GAP SERPL CALC-SCNC: 10 MMOL/L (ref 2–12)
BASOPHILS # BLD: 0.06 K/UL (ref 0–0.1)
BASOPHILS NFR BLD: 0.5 % (ref 0–1)
BUN SERPL-MCNC: 10 MG/DL (ref 6–20)
BUN/CREAT SERPL: 15 (ref 12–20)
CA-I BLD-MCNC: 9.3 MG/DL (ref 8.5–10.1)
CHLORIDE SERPL-SCNC: 106 MMOL/L (ref 97–108)
CO2 SERPL-SCNC: 20 MMOL/L (ref 21–32)
CREAT SERPL-MCNC: 0.68 MG/DL (ref 0.55–1.02)
DIFFERENTIAL METHOD BLD: ABNORMAL
EOSINOPHIL # BLD: 0.33 K/UL (ref 0–0.4)
EOSINOPHIL NFR BLD: 2.8 % (ref 0–7)
ERYTHROCYTE [DISTWIDTH] IN BLOOD BY AUTOMATED COUNT: 12.3 % (ref 11.5–14.5)
GLUCOSE BLD STRIP.AUTO-MCNC: 133 MG/DL (ref 65–100)
GLUCOSE BLD STRIP.AUTO-MCNC: 150 MG/DL (ref 65–100)
GLUCOSE SERPL-MCNC: 151 MG/DL (ref 65–100)
HCT VFR BLD AUTO: 41.3 % (ref 35–47)
HGB BLD-MCNC: 13.9 G/DL (ref 11.5–16)
IMM GRANULOCYTES # BLD AUTO: 0.04 K/UL (ref 0–0.04)
IMM GRANULOCYTES NFR BLD AUTO: 0.3 % (ref 0–0.5)
LYMPHOCYTES # BLD: 3.34 K/UL (ref 0.8–3.5)
LYMPHOCYTES NFR BLD: 28.5 % (ref 12–49)
MCH RBC QN AUTO: 30.4 PG (ref 26–34)
MCHC RBC AUTO-ENTMCNC: 33.7 G/DL (ref 30–36.5)
MCV RBC AUTO: 90.4 FL (ref 80–99)
MONOCYTES # BLD: 0.66 K/UL (ref 0–1)
MONOCYTES NFR BLD: 5.6 % (ref 5–13)
NEUTS SEG # BLD: 7.29 K/UL (ref 1.8–8)
NEUTS SEG NFR BLD: 62.3 % (ref 32–75)
NRBC # BLD: 0 K/UL (ref 0–0.01)
NRBC BLD-RTO: 0 PER 100 WBC
PERFORMED BY:: ABNORMAL
PERFORMED BY:: ABNORMAL
PLATELET # BLD AUTO: 269 K/UL (ref 150–400)
PMV BLD AUTO: 10.4 FL (ref 8.9–12.9)
POTASSIUM SERPL-SCNC: 4.2 MMOL/L (ref 3.5–5.1)
RBC # BLD AUTO: 4.57 M/UL (ref 3.8–5.2)
SODIUM SERPL-SCNC: 134 MMOL/L (ref 136–145)
SODIUM SERPL-SCNC: 136 MMOL/L (ref 136–145)
WBC # BLD AUTO: 11.7 K/UL (ref 3.6–11)

## 2025-07-16 PROCEDURE — 85025 COMPLETE CBC W/AUTO DIFF WBC: CPT

## 2025-07-16 PROCEDURE — 6370000000 HC RX 637 (ALT 250 FOR IP): Performed by: INTERNAL MEDICINE

## 2025-07-16 PROCEDURE — 80048 BASIC METABOLIC PNL TOTAL CA: CPT

## 2025-07-16 PROCEDURE — 6360000002 HC RX W HCPCS

## 2025-07-16 PROCEDURE — 6360000002 HC RX W HCPCS: Performed by: INTERNAL MEDICINE

## 2025-07-16 PROCEDURE — 36415 COLL VENOUS BLD VENIPUNCTURE: CPT

## 2025-07-16 PROCEDURE — 6370000000 HC RX 637 (ALT 250 FOR IP): Performed by: FAMILY MEDICINE

## 2025-07-16 PROCEDURE — 2500000003 HC RX 250 WO HCPCS: Performed by: INTERNAL MEDICINE

## 2025-07-16 PROCEDURE — 97530 THERAPEUTIC ACTIVITIES: CPT

## 2025-07-16 PROCEDURE — 82962 GLUCOSE BLOOD TEST: CPT

## 2025-07-16 PROCEDURE — 84295 ASSAY OF SERUM SODIUM: CPT

## 2025-07-16 RX ORDER — LEVOFLOXACIN 250 MG/1
250 TABLET, FILM COATED ORAL EVERY 24 HOURS
Status: DISCONTINUED | OUTPATIENT
Start: 2025-07-16 | End: 2025-07-16 | Stop reason: HOSPADM

## 2025-07-16 RX ORDER — LEVOFLOXACIN 250 MG/1
250 TABLET, FILM COATED ORAL DAILY
Qty: 5 TABLET | Refills: 0 | Status: SHIPPED | OUTPATIENT
Start: 2025-07-16 | End: 2025-07-21

## 2025-07-16 RX ADMIN — VENLAFAXINE HYDROCHLORIDE 75 MG: 75 CAPSULE, EXTENDED RELEASE ORAL at 08:53

## 2025-07-16 RX ADMIN — CYANOCOBALAMIN TAB 1000 MCG 1000 MCG: 1000 TAB at 08:54

## 2025-07-16 RX ADMIN — SODIUM CHLORIDE, PRESERVATIVE FREE 10 ML: 5 INJECTION INTRAVENOUS at 08:56

## 2025-07-16 RX ADMIN — PRIMIDONE 250 MG: 250 TABLET ORAL at 08:53

## 2025-07-16 RX ADMIN — ACETAMINOPHEN 650 MG: 325 TABLET ORAL at 08:56

## 2025-07-16 RX ADMIN — BACLOFEN 10 MG: 10 TABLET ORAL at 08:53

## 2025-07-16 RX ADMIN — PROPRANOLOL HYDROCHLORIDE 60 MG: 20 TABLET ORAL at 08:53

## 2025-07-16 RX ADMIN — GABAPENTIN 300 MG: 100 CAPSULE ORAL at 08:53

## 2025-07-16 RX ADMIN — ENOXAPARIN SODIUM 40 MG: 100 INJECTION SUBCUTANEOUS at 08:57

## 2025-07-16 RX ADMIN — ASPIRIN 81 MG: 81 TABLET, DELAYED RELEASE ORAL at 08:53

## 2025-07-16 RX ADMIN — LEVOTHYROXINE SODIUM 100 MCG: 0.1 TABLET ORAL at 08:53

## 2025-07-16 RX ADMIN — ARIPIPRAZOLE 5 MG: 5 TABLET ORAL at 08:53

## 2025-07-16 RX ADMIN — Medication 2000 UNITS: at 08:56

## 2025-07-16 RX ADMIN — LURASIDONE HYDROCHLORIDE 80 MG: 80 TABLET, FILM COATED ORAL at 08:54

## 2025-07-16 RX ADMIN — HYDRALAZINE HYDROCHLORIDE 10 MG: 20 INJECTION INTRAMUSCULAR; INTRAVENOUS at 02:43

## 2025-07-16 ASSESSMENT — PAIN SCALES - GENERAL
PAINLEVEL_OUTOF10: 7
PAINLEVEL_OUTOF10: 0

## 2025-07-16 ASSESSMENT — PAIN SCALES - WONG BAKER: WONGBAKER_NUMERICALRESPONSE: HURTS A LITTLE BIT

## 2025-07-16 NOTE — PROGRESS NOTES
4 Eyes Skin Assessment     NAME:  Estefania Jackson  YOB: 1955  MEDICAL RECORD NUMBER:  [id number]    The patient is being assessed for  Other weekly    I agree that at least one RN has performed a thorough Head to Toe Skin Assessment on the patient. ALL assessment sites listed below have been assessed.      Areas assessed by both nurses:    Head, Face, Ears, Shoulders, Back, Chest, Arms, Elbows, Hands, Sacrum. Buttock, Coccyx, Ischium, Legs. Feet and Heels, and Under Medical Devices         Does the Patient have a Wound? No noted wound(s)       Enio Prevention initiated by RN: No  Wound Care Orders initiated by RN: No    Pressure Injury (Stage 1,2,3,4, Unstageable, DTI, NWPT, and Complex wounds) if present, place Wound referral order by RN under : No    New Ostomies, if present place, Ostomy referral order under : No     Nurse 1 eSignature: Electronically signed by Â Alka Bermeo RN on 7/16/25 at 3:36 AM EDT    **SHARE this note so that the co-signing nurse can place an eSignature**    Nurse 2 eSignature: Electronically signed by Â Jodi Peterson RN on 7/16/25 at 5:29 AM EDT

## 2025-07-16 NOTE — PROGRESS NOTES
[facility]     Renal Daily Progress Note:     Subjective: Feels good, no complaints, able to eat but does not like the food.  Serum sodium stable off IV fluids.  No neurologic changes.      Review of Systems  Pertinent items are noted in HPI.    Objective:     BP (!) 165/96   Pulse 67   Temp 98.1 °F (36.7 °C) (Oral)   Resp 16   Ht 1.575 m (5' 2\")   Wt 78.2 kg (172 lb 6.4 oz)   SpO2 96%   BMI 31.53 kg/m²   Temp (24hrs), Av.8 °F (36.6 °C), Min:97.7 °F (36.5 °C), Max:98.1 °F (36.7 °C)        Intake/Output Summary (Last 24 hours) at 2025 1056  Last data filed at 2025 0135  Gross per 24 hour   Intake 1276.97 ml   Output 750 ml   Net 526.97 ml     Current Facility-Administered Medications   Medication Dose Route Frequency    levoFLOXacin (LEVAQUIN) tablet 250 mg  250 mg Oral Daily    hydrALAZINE (APRESOLINE) injection 10 mg  10 mg IntraVENous Q6H PRN    melatonin tablet 5 mg  5 mg Oral Nightly PRN    aspirin EC tablet 81 mg  81 mg Oral Daily    baclofen (LIORESAL) tablet 10 mg  10 mg Oral BID    Vitamin D (CHOLECALCIFEROL) tablet 2,000 Units  2,000 Units Oral Daily    clonazePAM (KLONOPIN) tablet 0.5 mg  0.5 mg Oral BID PRN    vitamin B-12 (CYANOCOBALAMIN) tablet 1,000 mcg  1,000 mcg Oral Daily    gabapentin (NEURONTIN) capsule 300 mg  300 mg Oral TID    levothyroxine (SYNTHROID) tablet 100 mcg  100 mcg Oral QAM AC    [Held by provider] losartan (COZAAR) tablet 25 mg  25 mg Oral Daily    propranolol (INDERAL) immediate release tablet 60 mg  60 mg Oral TID    [Held by provider] risperiDONE (RISPERDAL) tablet 4 mg  4 mg Oral Nightly    primidone (MYSOLINE) tablet 250 mg  250 mg Oral TID    traZODone (DESYREL) tablet 100 mg  100 mg Oral Nightly    venlafaxine (EFFEXOR XR) extended release capsule 75 mg  75 mg Oral TID    sodium chloride flush 0.9 % injection 5-40 mL  5-40 mL IntraVENous 2 times per day    sodium chloride flush 0.9 % injection 5-40 mL  5-40 mL IntraVENous PRN    0.9 % sodium  chloride infusion   IntraVENous PRN    potassium chloride (KLOR-CON M) extended release tablet 40 mEq  40 mEq Oral PRN    Or    potassium bicarb-citric acid (EFFER-K) effervescent tablet 40 mEq  40 mEq Oral PRN    Or    potassium chloride 10 mEq/100 mL IVPB (Peripheral Line)  10 mEq IntraVENous PRN    magnesium sulfate 2000 mg in 50 mL IVPB premix  2,000 mg IntraVENous PRN    enoxaparin (LOVENOX) injection 40 mg  40 mg SubCUTAneous Daily    ondansetron (ZOFRAN-ODT) disintegrating tablet 4 mg  4 mg Oral Q8H PRN    Or    ondansetron (ZOFRAN) injection 4 mg  4 mg IntraVENous Q6H PRN    polyethylene glycol (GLYCOLAX) packet 17 g  17 g Oral Daily PRN    acetaminophen (TYLENOL) tablet 650 mg  650 mg Oral Q6H PRN    Or    acetaminophen (TYLENOL) suppository 650 mg  650 mg Rectal Q6H PRN    atorvastatin (LIPITOR) tablet 10 mg  10 mg Oral Nightly    lurasidone (LATUDA) tablet 80 mg  80 mg Oral QAM    ARIPiprazole (ABILIFY) tablet 5 mg  5 mg Oral Daily    insulin lispro (HUMALOG,ADMELOG) injection vial 0-16 Units  0-16 Units SubCUTAneous 4x Daily AC & HS    glucose chewable tablet 16 g  4 tablet Oral PRN    dextrose bolus 10% 125 mL  125 mL IntraVENous PRN    Or    dextrose bolus 10% 250 mL  250 mL IntraVENous PRN    glucagon injection 1 mg  1 mg SubCUTAneous PRN    dextrose 10 % infusion   IntraVENous Continuous PRN       Physical Exam:General appearance: alert, appears stated age, cooperative, and no distress  Head: Normocephalic, without obvious abnormality, atraumatic  Neck: no JVD and supple, symmetrical, trachea midline  Lungs: clear to auscultation bilaterally  Heart: regular rate and rhythm and no S3 or S4  Abdomen: soft, non-tender; bowel sounds normal; no masses,  no organomegaly  Extremities: extremities normal, atraumatic, no cyanosis or edema  Neurologic: Grossly normal     Data Review:     LABS:  Recent Labs     07/16/25  0450 07/15/25  1609 07/15/25  0805 07/14/25  1400    135* 137 124*   K 4.2  --  4.2

## 2025-07-16 NOTE — PLAN OF CARE
Problem: Chronic Conditions and Co-morbidities  Goal: Patient's chronic conditions and co-morbidity symptoms are monitored and maintained or improved  Outcome: Progressing     Problem: Discharge Planning  Goal: Discharge to home or other facility with appropriate resources  7/16/2025 0823 by Lisa Edwards RN  Outcome: Progressing  7/16/2025 0137 by VAZQUEZ Bermeo RN  Outcome: Progressing     Problem: Safety - Adult  Goal: Free from fall injury  7/16/2025 0823 by Lisa Edwards RN  Outcome: Progressing  7/16/2025 0137 by VAZQUEZ Bermeo RN  Outcome: Progressing     Problem: Pain  Goal: Verbalizes/displays adequate comfort level or baseline comfort level  7/16/2025 0823 by Lisa Edwards RN  Outcome: Progressing  7/16/2025 0137 by VAZQUEZ Bermeo RN  Outcome: Progressing     Problem: Skin/Tissue Integrity  Goal: Skin integrity remains intact  Description: 1.  Monitor for areas of redness and/or skin breakdown  2.  Assess vascular access sites hourly  3.  Every 4-6 hours minimum:  Change oxygen saturation probe site  4.  Every 4-6 hours:  If on nasal continuous positive airway pressure, respiratory therapy assess nares and determine need for appliance change or resting period  7/16/2025 0823 by Lisa Edwards RN  Outcome: Progressing  7/16/2025 0137 by VAZQUEZ Bermeo RN  Outcome: Progressing

## 2025-07-16 NOTE — DISCHARGE SUMMARY
DISCHARGE SUMMARY              Name: Estefania Jackson  [id number]  YOB: 1955 (Age: 70 y.o.)   Date of Admission: 7/14/2025  Date of Discharge: 7/16/2025  Attending Physician: Reji Tong MD    DISCHARGE DIAGNOSIS:    UTI  Hyponatremia  DM2  Hypothyroidism  History of CVA with right-sided weakness      CONSULTATIONS:  IP CONSULT TO NEPHROLOGY  IP CONSULT TO CASE MANAGEMENT    Excerpted HPI from H&P of Mary Onofre MD:  CHIEF COMPLAINT: Dysuria     HISTORY OF PRESENT ILLNESS:     Estefania Jackson is a 70 y.o.  female with PMHx significant for diabetes hypertension fibromyalgia CVA with status residual weakness came to the ER because of dysuria and frequency UA came back positive for UTI but blood work shows hyponatremia sodium of 124 patient was recommended to be admitted for hyponatremia and UTI     Denies any chest pain shortness of breath nausea vomiting diarrhea constipation no fever no chills  We were asked to admit for work up and evaluation of the above problems.     Brief Hospital Course by Main Problems:     UTI  - UA greater than 100 WBC, 4+ bacteria, large LE  - Urine culture: E. coli  - Started with empiric IV Rocephin.  Transition to oral Levaquin to complete treatment as outpatient     Hyponatremia, resolved  - -->137  -Suspect was hypovolemic.  Patient denies taking a diuretic  - IV fluids discontinued  -Appreciate nephrology input     DM2  - Sliding scale insulin as needed.  Blood sugars have remained stable     Hypothyroidism  - Continue Synthroid     History of CVA with right-sided weakness  -Continue aspirin            _______________________________________________________________________  Patient seen and examined by me on discharge day.  Pertinent Findings:  Patient Vitals for the past 24 hrs:   BP Temp Temp src Pulse Resp SpO2   07/16/25 0810 (!) 165/96 98.1 °F (36.7 °C) Oral 67 16 96 %   07/16/25 0458 (!) 142/88 -- -- 66 -- 96 %

## 2025-07-16 NOTE — PLAN OF CARE
PHYSICAL THERAPY TREATMENT     Patient: Estefania Jackson (70 y.o. female)  Date: 7/16/2025  Diagnosis: Hypokalemia [E87.6]  UTI (urinary tract infection) [N39.0]  Acute cystitis without hematuria [N30.00] UTI (urinary tract infection)      Precautions:                        Recommendations for nursing mobility: Encourage HEP in prep for ADLs/mobility; see handout for details, Frequent repositioning to prevent skin breakdown, and LE elevation for management of edema    In place during session: Peripheral IV, External Catheter, and EKG/telemetry   Chart, physical therapy assessment, plan of care and goals were reviewed.  ASSESSMENT  Patient continues with skilled PT services and is progressing towards goals. Pt semi supine upon PT arrival, agreeable to session. Pt A&O x 4. (See below for objective details and assist levels).     Overall pt tolerated session good today with PT. Patient reported feeling better.Required sba to MI for bed mob, transfers and gait in hallways with RW.Required cues to keep the walker closer . Will continue to benefit from skilled PT services, and will continue to progress as tolerated. Current PT DC recommendation Intermittent physical therapy up to 2-3x/week in previous living setting with additional support needed for ADLs once medically appropriate.      GOALS:    Problem: Physical Therapy - Adult  Goal: By Discharge: Performs mobility at highest level of function for planned discharge setting.  See evaluation for individualized goals.  Description: FUNCTIONAL STATUS PRIOR TO ADMISSION: Patient was modified independent using a single point cane for functional mobility.    HOME SUPPORT PRIOR TO ADMISSION: The patient lived with  and son and required minimal assistance for all activities as per .    Physical Therapy Goals  Initiated 7/15/2025  Pt stated goal: Get better  Pt will be I with LE HEP in 7 days.  Pt will perform bed mobility with Modified [place] in 7

## 2025-07-16 NOTE — CARE COORDINATION
CM reviewed chart, discharge order noted. Patient to be discharged on PO abx per IDR.     DCP: Home, self care once clinically stable    1300: JOLEEN advised that MD has ordered PT/HH. CM spoke with patient, she agreed and chose [facility] as she has used them in the past.   Choice letter signed and placed on chart. Referral sent to [facility]. Awaiting response.     1345: [facility] accepted, they will contact patient with SOC date. Patient is aware and will call  to pick her up.    Transition of Care Plan:    RUR: 10%  Prior Level of Functioning: needs assistance  Disposition: home, self care  LAUREN: today  If SNF or IPR: Date FOC offered: n/a  Date FOC received: n/a  Accepting facility: n/a  Date authorization started with reference number: n/a  Date authorization received and expires: n/a  Follow up appointments: per discharge summary  DME needed: none  Transportation at discharge:   [facility] Medicare/ letter given: yes  Is patient a  and connected with VA? no  If yes, was [facility] transfer form completed and [facility] notified? N/a  Caregiver Contact: yes  Discharge Caregiver contacted prior to discharge? Yes,   Care Conference needed? no  Barriers to discharge:  none

## 2025-07-17 LAB
BACTERIA SPEC CULT: ABNORMAL
BACTERIA SPEC CULT: ABNORMAL
COLONY COUNT, CNT: ABNORMAL
Lab: ABNORMAL

## 2025-08-13 PROBLEM — N39.0 UTI (URINARY TRACT INFECTION): Status: RESOLVED | Noted: 2025-07-14 | Resolved: 2025-08-13

## 2025-08-31 ENCOUNTER — APPOINTMENT (OUTPATIENT)
Facility: HOSPITAL | Age: 70
DRG: 690 | End: 2025-08-31
Payer: MEDICARE

## 2025-08-31 ENCOUNTER — HOSPITAL ENCOUNTER (INPATIENT)
Facility: HOSPITAL | Age: 70
LOS: 4 days | Discharge: HOME | DRG: 690 | End: 2025-09-04
Admitting: INTERNAL MEDICINE
Payer: MEDICARE

## 2025-08-31 DIAGNOSIS — N39.0 URINARY TRACT INFECTION WITHOUT HEMATURIA, SITE UNSPECIFIED: Primary | ICD-10-CM

## 2025-08-31 LAB
ALBUMIN SERPL-MCNC: 3.8 G/DL (ref 3.5–5)
ALBUMIN/GLOB SERPL: 1.3 (ref 1.1–2.2)
ALP SERPL-CCNC: 76 U/L (ref 45–117)
ALT SERPL-CCNC: 44 U/L (ref 12–78)
ANION GAP SERPL CALC-SCNC: 7 MMOL/L (ref 2–12)
APPEARANCE UR: ABNORMAL
AST SERPL W P-5'-P-CCNC: 22 U/L (ref 15–37)
BACTERIA URNS QL MICRO: ABNORMAL /HPF
BASOPHILS # BLD: 0.06 K/UL (ref 0–0.1)
BASOPHILS NFR BLD: 0.5 % (ref 0–1)
BILIRUB SERPL-MCNC: 0.4 MG/DL (ref 0.2–1)
BILIRUB UR QL: NEGATIVE
BUN SERPL-MCNC: 9 MG/DL (ref 6–20)
BUN/CREAT SERPL: 11 (ref 12–20)
CA-I BLD-MCNC: 8.8 MG/DL (ref 8.5–10.1)
CHLORIDE SERPL-SCNC: 104 MMOL/L (ref 97–108)
CO2 SERPL-SCNC: 25 MMOL/L (ref 21–32)
COLOR UR: ABNORMAL
CREAT SERPL-MCNC: 0.83 MG/DL (ref 0.55–1.02)
DIFFERENTIAL METHOD BLD: ABNORMAL
EOSINOPHIL # BLD: 0.13 K/UL (ref 0–0.4)
EOSINOPHIL NFR BLD: 1 % (ref 0–7)
EPITH CASTS URNS QL MICRO: ABNORMAL /LPF
ERYTHROCYTE [DISTWIDTH] IN BLOOD BY AUTOMATED COUNT: 12.6 % (ref 11.5–14.5)
GLOBULIN SER CALC-MCNC: 2.9 G/DL (ref 2–4)
GLUCOSE SERPL-MCNC: 155 MG/DL (ref 65–100)
GLUCOSE UR STRIP.AUTO-MCNC: NEGATIVE MG/DL
HCT VFR BLD AUTO: 38.9 % (ref 35–47)
HGB BLD-MCNC: 13.1 G/DL (ref 11.5–16)
HGB UR QL STRIP: NEGATIVE
IMM GRANULOCYTES # BLD AUTO: 0.05 K/UL (ref 0–0.04)
IMM GRANULOCYTES NFR BLD AUTO: 0.4 % (ref 0–0.5)
KETONES UR QL STRIP.AUTO: NEGATIVE MG/DL
LEUKOCYTE ESTERASE UR QL STRIP.AUTO: ABNORMAL
LYMPHOCYTES # BLD: 1.88 K/UL (ref 0.8–3.5)
LYMPHOCYTES NFR BLD: 14.3 % (ref 12–49)
MCH RBC QN AUTO: 31 PG (ref 26–34)
MCHC RBC AUTO-ENTMCNC: 33.7 G/DL (ref 30–36.5)
MCV RBC AUTO: 92.2 FL (ref 80–99)
MONOCYTES # BLD: 1.09 K/UL (ref 0–1)
MONOCYTES NFR BLD: 8.3 % (ref 5–13)
NEUTS SEG # BLD: 9.95 K/UL (ref 1.8–8)
NEUTS SEG NFR BLD: 75.5 % (ref 32–75)
NITRITE UR QL STRIP.AUTO: POSITIVE
NRBC # BLD: 0 K/UL (ref 0–0.01)
NRBC BLD-RTO: 0 PER 100 WBC
OTHER: ABNORMAL
PH UR STRIP: 6 (ref 5–8)
PLATELET # BLD AUTO: 223 K/UL (ref 150–400)
PMV BLD AUTO: 11.2 FL (ref 8.9–12.9)
POTASSIUM SERPL-SCNC: 4.3 MMOL/L (ref 3.5–5.1)
PROCALCITONIN SERPL-MCNC: 0.05 NG/ML
PROT SERPL-MCNC: 6.7 G/DL (ref 6.4–8.2)
PROT UR STRIP-MCNC: 30 MG/DL
RBC # BLD AUTO: 4.22 M/UL (ref 3.8–5.2)
RBC #/AREA URNS HPF: ABNORMAL /HPF (ref 0–5)
SODIUM SERPL-SCNC: 136 MMOL/L (ref 136–145)
SP GR UR REFRACTOMETRY: 1.01 (ref 1–1.03)
TROPONIN I SERPL HS-MCNC: 6 NG/L (ref 0–51)
TSH SERPL DL<=0.05 MIU/L-ACNC: 1.14 UIU/ML (ref 0.36–3.74)
URINE CULTURE IF INDICATED: ABNORMAL
UROBILINOGEN UR QL STRIP.AUTO: 0.1 EU/DL (ref 0.1–1)
WBC # BLD AUTO: 13.2 K/UL (ref 3.6–11)
WBC URNS QL MICRO: >100 /HPF (ref 0–4)

## 2025-08-31 PROCEDURE — 93005 ELECTROCARDIOGRAM TRACING: CPT

## 2025-08-31 PROCEDURE — 36415 COLL VENOUS BLD VENIPUNCTURE: CPT

## 2025-08-31 PROCEDURE — 87186 SC STD MICRODIL/AGAR DIL: CPT

## 2025-08-31 PROCEDURE — 84439 ASSAY OF FREE THYROXINE: CPT

## 2025-08-31 PROCEDURE — 87154 CUL TYP ID BLD PTHGN 6+ TRGT: CPT

## 2025-08-31 PROCEDURE — 84145 PROCALCITONIN (PCT): CPT

## 2025-08-31 PROCEDURE — 1100000000 HC RM PRIVATE

## 2025-08-31 PROCEDURE — 85025 COMPLETE CBC W/AUTO DIFF WBC: CPT

## 2025-08-31 PROCEDURE — 87040 BLOOD CULTURE FOR BACTERIA: CPT

## 2025-08-31 PROCEDURE — 80053 COMPREHEN METABOLIC PANEL: CPT

## 2025-08-31 PROCEDURE — 6360000002 HC RX W HCPCS: Performed by: INTERNAL MEDICINE

## 2025-08-31 PROCEDURE — 2580000003 HC RX 258: Performed by: INTERNAL MEDICINE

## 2025-08-31 PROCEDURE — 73502 X-RAY EXAM HIP UNI 2-3 VIEWS: CPT

## 2025-08-31 PROCEDURE — 84443 ASSAY THYROID STIM HORMONE: CPT

## 2025-08-31 PROCEDURE — 99285 EMERGENCY DEPT VISIT HI MDM: CPT

## 2025-08-31 PROCEDURE — 84484 ASSAY OF TROPONIN QUANT: CPT

## 2025-08-31 PROCEDURE — 81001 URINALYSIS AUTO W/SCOPE: CPT

## 2025-08-31 PROCEDURE — 87088 URINE BACTERIA CULTURE: CPT

## 2025-08-31 PROCEDURE — 2500000003 HC RX 250 WO HCPCS: Performed by: INTERNAL MEDICINE

## 2025-08-31 PROCEDURE — 71045 X-RAY EXAM CHEST 1 VIEW: CPT

## 2025-08-31 PROCEDURE — 87086 URINE CULTURE/COLONY COUNT: CPT

## 2025-08-31 PROCEDURE — 70450 CT HEAD/BRAIN W/O DYE: CPT

## 2025-08-31 PROCEDURE — 6370000000 HC RX 637 (ALT 250 FOR IP): Performed by: INTERNAL MEDICINE

## 2025-08-31 RX ORDER — SODIUM CHLORIDE 9 MG/ML
INJECTION, SOLUTION INTRAVENOUS PRN
Status: DISCONTINUED | OUTPATIENT
Start: 2025-08-31 | End: 2025-09-04 | Stop reason: HOSPADM

## 2025-08-31 RX ORDER — POLYETHYLENE GLYCOL 3350 17 G/17G
17 POWDER, FOR SOLUTION ORAL DAILY PRN
Status: DISCONTINUED | OUTPATIENT
Start: 2025-08-31 | End: 2025-09-04 | Stop reason: HOSPADM

## 2025-08-31 RX ORDER — ACETAMINOPHEN 650 MG/1
650 SUPPOSITORY RECTAL EVERY 6 HOURS PRN
Status: DISCONTINUED | OUTPATIENT
Start: 2025-08-31 | End: 2025-09-04 | Stop reason: HOSPADM

## 2025-08-31 RX ORDER — SODIUM CHLORIDE 9 MG/ML
INJECTION, SOLUTION INTRAVENOUS CONTINUOUS
Status: DISCONTINUED | OUTPATIENT
Start: 2025-08-31 | End: 2025-09-04 | Stop reason: HOSPADM

## 2025-08-31 RX ORDER — ACETAMINOPHEN 325 MG/1
650 TABLET ORAL EVERY 6 HOURS PRN
Status: DISCONTINUED | OUTPATIENT
Start: 2025-08-31 | End: 2025-09-04 | Stop reason: HOSPADM

## 2025-08-31 RX ORDER — ONDANSETRON 4 MG/1
4 TABLET, ORALLY DISINTEGRATING ORAL EVERY 8 HOURS PRN
Status: DISCONTINUED | OUTPATIENT
Start: 2025-08-31 | End: 2025-09-04 | Stop reason: HOSPADM

## 2025-08-31 RX ORDER — SODIUM CHLORIDE 0.9 % (FLUSH) 0.9 %
5-40 SYRINGE (ML) INJECTION PRN
Status: DISCONTINUED | OUTPATIENT
Start: 2025-08-31 | End: 2025-09-04 | Stop reason: HOSPADM

## 2025-08-31 RX ORDER — ONDANSETRON 2 MG/ML
4 INJECTION INTRAMUSCULAR; INTRAVENOUS EVERY 6 HOURS PRN
Status: DISCONTINUED | OUTPATIENT
Start: 2025-08-31 | End: 2025-09-04 | Stop reason: HOSPADM

## 2025-08-31 RX ORDER — MAGNESIUM SULFATE IN WATER 40 MG/ML
2000 INJECTION, SOLUTION INTRAVENOUS PRN
Status: DISCONTINUED | OUTPATIENT
Start: 2025-08-31 | End: 2025-09-04 | Stop reason: HOSPADM

## 2025-08-31 RX ORDER — CIPROFLOXACIN 500 MG/1
500 TABLET, FILM COATED ORAL
Status: DISCONTINUED | OUTPATIENT
Start: 2025-08-31 | End: 2025-08-31

## 2025-08-31 RX ORDER — ENOXAPARIN SODIUM 100 MG/ML
40 INJECTION SUBCUTANEOUS DAILY
Status: DISCONTINUED | OUTPATIENT
Start: 2025-08-31 | End: 2025-09-04 | Stop reason: HOSPADM

## 2025-08-31 RX ORDER — MAGNESIUM HYDROXIDE/ALUMINUM HYDROXICE/SIMETHICONE 120; 1200; 1200 MG/30ML; MG/30ML; MG/30ML
30 SUSPENSION ORAL EVERY 6 HOURS PRN
Status: DISCONTINUED | OUTPATIENT
Start: 2025-08-31 | End: 2025-09-04 | Stop reason: HOSPADM

## 2025-08-31 RX ORDER — POTASSIUM CHLORIDE 7.45 MG/ML
10 INJECTION INTRAVENOUS PRN
Status: DISCONTINUED | OUTPATIENT
Start: 2025-08-31 | End: 2025-09-04 | Stop reason: HOSPADM

## 2025-08-31 RX ORDER — SODIUM CHLORIDE 0.9 % (FLUSH) 0.9 %
5-40 SYRINGE (ML) INJECTION EVERY 12 HOURS SCHEDULED
Status: DISCONTINUED | OUTPATIENT
Start: 2025-08-31 | End: 2025-09-04 | Stop reason: HOSPADM

## 2025-08-31 RX ADMIN — MEROPENEM 1000 MG: 1 INJECTION INTRAVENOUS at 17:34

## 2025-08-31 RX ADMIN — SODIUM CHLORIDE, PRESERVATIVE FREE 10 ML: 5 INJECTION INTRAVENOUS at 20:02

## 2025-08-31 RX ADMIN — ACETAMINOPHEN 650 MG: 325 TABLET ORAL at 19:44

## 2025-08-31 RX ADMIN — SODIUM CHLORIDE, PRESERVATIVE FREE 10 ML: 5 INJECTION INTRAVENOUS at 21:46

## 2025-08-31 RX ADMIN — SODIUM CHLORIDE: 0.9 INJECTION, SOLUTION INTRAVENOUS at 20:02

## 2025-08-31 RX ADMIN — ONDANSETRON 4 MG: 2 INJECTION, SOLUTION INTRAMUSCULAR; INTRAVENOUS at 19:52

## 2025-08-31 ASSESSMENT — PAIN - FUNCTIONAL ASSESSMENT
PAIN_FUNCTIONAL_ASSESSMENT: 0-10

## 2025-08-31 ASSESSMENT — PAIN DESCRIPTION - DESCRIPTORS: DESCRIPTORS: ACHING

## 2025-08-31 ASSESSMENT — PAIN SCALES - GENERAL: PAINLEVEL_OUTOF10: 3

## 2025-08-31 ASSESSMENT — PAIN DESCRIPTION - LOCATION
LOCATION: LEG
LOCATION: HEAD

## 2025-08-31 ASSESSMENT — PAIN DESCRIPTION - ORIENTATION
ORIENTATION: RIGHT
ORIENTATION: RIGHT

## 2025-09-01 ENCOUNTER — APPOINTMENT (OUTPATIENT)
Facility: HOSPITAL | Age: 70
DRG: 690 | End: 2025-09-01
Payer: MEDICARE

## 2025-09-01 LAB
BASOPHILS # BLD: 0.06 K/UL (ref 0–0.1)
BASOPHILS NFR BLD: 0.5 % (ref 0–1)
DIFFERENTIAL METHOD BLD: ABNORMAL
EKG ATRIAL RATE: 79 BPM
EKG DIAGNOSIS: NORMAL
EKG P-R INTERVAL: 250 MS
EKG Q-T INTERVAL: 380 MS
EKG QRS DURATION: 94 MS
EKG QTC CALCULATION (BAZETT): 435 MS
EKG R AXIS: -11 DEGREES
EKG T AXIS: 61 DEGREES
EKG VENTRICULAR RATE: 79 BPM
EOSINOPHIL # BLD: 0.04 K/UL (ref 0–0.4)
EOSINOPHIL NFR BLD: 0.3 % (ref 0–7)
ERYTHROCYTE [DISTWIDTH] IN BLOOD BY AUTOMATED COUNT: 12.4 % (ref 11.5–14.5)
EST. AVERAGE GLUCOSE BLD GHB EST-MCNC: 140 MG/DL
GLUCOSE BLD STRIP.AUTO-MCNC: 125 MG/DL (ref 65–100)
GLUCOSE BLD STRIP.AUTO-MCNC: 142 MG/DL (ref 65–100)
GLUCOSE BLD STRIP.AUTO-MCNC: 144 MG/DL (ref 65–100)
HBA1C MFR BLD: 6.5 % (ref 4–5.6)
HCT VFR BLD AUTO: 35.2 % (ref 35–47)
HGB BLD-MCNC: 11.8 G/DL (ref 11.5–16)
IMM GRANULOCYTES # BLD AUTO: 0.07 K/UL (ref 0–0.04)
IMM GRANULOCYTES NFR BLD AUTO: 0.6 % (ref 0–0.5)
LYMPHOCYTES # BLD: 3.05 K/UL (ref 0.8–3.5)
LYMPHOCYTES NFR BLD: 24.7 % (ref 12–49)
MCH RBC QN AUTO: 31.1 PG (ref 26–34)
MCHC RBC AUTO-ENTMCNC: 33.5 G/DL (ref 30–36.5)
MCV RBC AUTO: 92.6 FL (ref 80–99)
MONOCYTES # BLD: 1.37 K/UL (ref 0–1)
MONOCYTES NFR BLD: 11.1 % (ref 5–13)
NEUTS SEG # BLD: 7.76 K/UL (ref 1.8–8)
NEUTS SEG NFR BLD: 62.8 % (ref 32–75)
NRBC # BLD: 0 K/UL (ref 0–0.01)
NRBC BLD-RTO: 0 PER 100 WBC
PERFORMED BY:: ABNORMAL
PLATELET # BLD AUTO: 206 K/UL (ref 150–400)
PMV BLD AUTO: 10.9 FL (ref 8.9–12.9)
RBC # BLD AUTO: 3.8 M/UL (ref 3.8–5.2)
T4 FREE SERPL-MCNC: 1.2 NG/DL (ref 0.9–1.6)
WBC # BLD AUTO: 12.4 K/UL (ref 3.6–11)

## 2025-09-01 PROCEDURE — 6360000002 HC RX W HCPCS: Performed by: INTERNAL MEDICINE

## 2025-09-01 PROCEDURE — 82962 GLUCOSE BLOOD TEST: CPT

## 2025-09-01 PROCEDURE — 97535 SELF CARE MNGMENT TRAINING: CPT

## 2025-09-01 PROCEDURE — 87641 MR-STAPH DNA AMP PROBE: CPT

## 2025-09-01 PROCEDURE — 97530 THERAPEUTIC ACTIVITIES: CPT

## 2025-09-01 PROCEDURE — 97165 OT EVAL LOW COMPLEX 30 MIN: CPT

## 2025-09-01 PROCEDURE — 94761 N-INVAS EAR/PLS OXIMETRY MLT: CPT

## 2025-09-01 PROCEDURE — 73560 X-RAY EXAM OF KNEE 1 OR 2: CPT

## 2025-09-01 PROCEDURE — 1100000000 HC RM PRIVATE

## 2025-09-01 PROCEDURE — 84439 ASSAY OF FREE THYROXINE: CPT

## 2025-09-01 PROCEDURE — 2500000003 HC RX 250 WO HCPCS: Performed by: INTERNAL MEDICINE

## 2025-09-01 PROCEDURE — 83036 HEMOGLOBIN GLYCOSYLATED A1C: CPT

## 2025-09-01 PROCEDURE — 2580000003 HC RX 258: Performed by: INTERNAL MEDICINE

## 2025-09-01 PROCEDURE — 97116 GAIT TRAINING THERAPY: CPT

## 2025-09-01 PROCEDURE — 97161 PT EVAL LOW COMPLEX 20 MIN: CPT

## 2025-09-01 PROCEDURE — 6370000000 HC RX 637 (ALT 250 FOR IP): Performed by: INTERNAL MEDICINE

## 2025-09-01 PROCEDURE — 85025 COMPLETE CBC W/AUTO DIFF WBC: CPT

## 2025-09-01 RX ORDER — LEVOTHYROXINE SODIUM 25 UG/1
50 TABLET ORAL
Status: DISCONTINUED | OUTPATIENT
Start: 2025-09-02 | End: 2025-09-04 | Stop reason: HOSPADM

## 2025-09-01 RX ORDER — LANOLIN ALCOHOL/MO/W.PET/CERES
1000 CREAM (GRAM) TOPICAL DAILY
Status: DISCONTINUED | OUTPATIENT
Start: 2025-09-01 | End: 2025-09-04 | Stop reason: HOSPADM

## 2025-09-01 RX ORDER — ARIPIPRAZOLE 5 MG/1
5 TABLET ORAL DAILY
Status: DISCONTINUED | OUTPATIENT
Start: 2025-09-02 | End: 2025-09-04 | Stop reason: HOSPADM

## 2025-09-01 RX ORDER — METFORMIN HYDROCHLORIDE EXTENDED-RELEASE TABLETS 500 MG/1
500 TABLET, FILM COATED, EXTENDED RELEASE ORAL DAILY
Status: DISCONTINUED | OUTPATIENT
Start: 2025-09-01 | End: 2025-09-04 | Stop reason: HOSPADM

## 2025-09-01 RX ORDER — GABAPENTIN 300 MG/1
300 CAPSULE ORAL 3 TIMES DAILY
Status: DISCONTINUED | OUTPATIENT
Start: 2025-09-01 | End: 2025-09-04 | Stop reason: HOSPADM

## 2025-09-01 RX ORDER — RISPERIDONE 1 MG/1
1 TABLET ORAL NIGHTLY
Status: DISCONTINUED | OUTPATIENT
Start: 2025-09-01 | End: 2025-09-01

## 2025-09-01 RX ORDER — GLUCAGON 1 MG/ML
1 KIT INJECTION PRN
Status: DISCONTINUED | OUTPATIENT
Start: 2025-09-01 | End: 2025-09-04 | Stop reason: HOSPADM

## 2025-09-01 RX ORDER — LURASIDONE HYDROCHLORIDE 20 MG/1
60 TABLET, FILM COATED ORAL EVERY MORNING
Status: DISCONTINUED | OUTPATIENT
Start: 2025-09-01 | End: 2025-09-01

## 2025-09-01 RX ORDER — VENLAFAXINE HYDROCHLORIDE 75 MG/1
75 CAPSULE, EXTENDED RELEASE ORAL 3 TIMES DAILY
Status: DISCONTINUED | OUTPATIENT
Start: 2025-09-01 | End: 2025-09-04 | Stop reason: HOSPADM

## 2025-09-01 RX ORDER — TRAZODONE HYDROCHLORIDE 50 MG/1
150 TABLET ORAL NIGHTLY
Status: DISCONTINUED | OUTPATIENT
Start: 2025-09-01 | End: 2025-09-04 | Stop reason: HOSPADM

## 2025-09-01 RX ORDER — BACLOFEN 10 MG/1
10 TABLET ORAL 2 TIMES DAILY
Status: DISCONTINUED | OUTPATIENT
Start: 2025-09-01 | End: 2025-09-04 | Stop reason: HOSPADM

## 2025-09-01 RX ORDER — CLONAZEPAM 0.5 MG/1
0.5 TABLET ORAL 2 TIMES DAILY PRN
Status: DISCONTINUED | OUTPATIENT
Start: 2025-09-01 | End: 2025-09-04 | Stop reason: HOSPADM

## 2025-09-01 RX ORDER — ASPIRIN 81 MG/1
81 TABLET ORAL DAILY
Status: DISCONTINUED | OUTPATIENT
Start: 2025-09-01 | End: 2025-09-04 | Stop reason: HOSPADM

## 2025-09-01 RX ORDER — DEXTROSE MONOHYDRATE 100 MG/ML
INJECTION, SOLUTION INTRAVENOUS CONTINUOUS PRN
Status: DISCONTINUED | OUTPATIENT
Start: 2025-09-01 | End: 2025-09-04 | Stop reason: HOSPADM

## 2025-09-01 RX ORDER — LOSARTAN POTASSIUM 50 MG/1
25 TABLET ORAL DAILY
Status: DISCONTINUED | OUTPATIENT
Start: 2025-09-01 | End: 2025-09-01

## 2025-09-01 RX ORDER — INSULIN LISPRO 100 [IU]/ML
0-8 INJECTION, SOLUTION INTRAVENOUS; SUBCUTANEOUS
Status: DISCONTINUED | OUTPATIENT
Start: 2025-09-01 | End: 2025-09-04 | Stop reason: HOSPADM

## 2025-09-01 RX ORDER — PROPRANOLOL HYDROCHLORIDE 40 MG/1
60 TABLET ORAL 3 TIMES DAILY
Status: DISCONTINUED | OUTPATIENT
Start: 2025-09-01 | End: 2025-09-04 | Stop reason: HOSPADM

## 2025-09-01 RX ORDER — PRIMIDONE 250 MG/1
250 TABLET ORAL 3 TIMES DAILY
Status: DISCONTINUED | OUTPATIENT
Start: 2025-09-01 | End: 2025-09-04 | Stop reason: HOSPADM

## 2025-09-01 RX ORDER — LOSARTAN POTASSIUM 50 MG/1
50 TABLET ORAL DAILY
Status: DISCONTINUED | OUTPATIENT
Start: 2025-09-02 | End: 2025-09-04 | Stop reason: HOSPADM

## 2025-09-01 RX ORDER — LURASIDONE HYDROCHLORIDE 20 MG/1
80 TABLET, FILM COATED ORAL EVERY MORNING
Status: DISCONTINUED | OUTPATIENT
Start: 2025-09-02 | End: 2025-09-04 | Stop reason: HOSPADM

## 2025-09-01 RX ORDER — TRAZODONE HYDROCHLORIDE 50 MG/1
100 TABLET ORAL NIGHTLY
Status: DISCONTINUED | OUTPATIENT
Start: 2025-09-01 | End: 2025-09-01

## 2025-09-01 RX ORDER — VITAMIN B COMPLEX
2000 TABLET ORAL DAILY
Status: DISCONTINUED | OUTPATIENT
Start: 2025-09-01 | End: 2025-09-04 | Stop reason: HOSPADM

## 2025-09-01 RX ORDER — LEVOTHYROXINE SODIUM 100 UG/1
100 TABLET ORAL
Status: DISCONTINUED | OUTPATIENT
Start: 2025-09-01 | End: 2025-09-01

## 2025-09-01 RX ORDER — CLONAZEPAM 0.5 MG/1
0.5 TABLET ORAL EVERY 12 HOURS
Status: DISCONTINUED | OUTPATIENT
Start: 2025-09-01 | End: 2025-09-04 | Stop reason: HOSPADM

## 2025-09-01 RX ADMIN — ACETAMINOPHEN 650 MG: 325 TABLET ORAL at 10:57

## 2025-09-01 RX ADMIN — VENLAFAXINE HYDROCHLORIDE 75 MG: 75 CAPSULE, EXTENDED RELEASE ORAL at 10:51

## 2025-09-01 RX ADMIN — METFORMIN 500 MG: 500 TABLET, EXTENDED RELEASE ORAL at 10:51

## 2025-09-01 RX ADMIN — BACLOFEN 10 MG: 10 TABLET ORAL at 21:20

## 2025-09-01 RX ADMIN — CYANOCOBALAMIN TAB 1000 MCG 1000 MCG: 1000 TAB at 10:52

## 2025-09-01 RX ADMIN — ENOXAPARIN SODIUM 40 MG: 100 INJECTION SUBCUTANEOUS at 10:56

## 2025-09-01 RX ADMIN — PROPRANOLOL HYDROCHLORIDE 60 MG: 40 TABLET ORAL at 21:19

## 2025-09-01 RX ADMIN — TRAZODONE HYDROCHLORIDE 150 MG: 50 TABLET ORAL at 21:19

## 2025-09-01 RX ADMIN — VENLAFAXINE HYDROCHLORIDE 75 MG: 75 CAPSULE, EXTENDED RELEASE ORAL at 15:56

## 2025-09-01 RX ADMIN — PRIMIDONE 250 MG: 250 TABLET ORAL at 15:57

## 2025-09-01 RX ADMIN — ACETAMINOPHEN 650 MG: 325 TABLET ORAL at 04:56

## 2025-09-01 RX ADMIN — SODIUM CHLORIDE: 0.9 INJECTION, SOLUTION INTRAVENOUS at 06:38

## 2025-09-01 RX ADMIN — LURASIDONE HYDROCHLORIDE 60 MG: 20 TABLET, FILM COATED ORAL at 10:51

## 2025-09-01 RX ADMIN — PRIMIDONE 250 MG: 250 TABLET ORAL at 10:52

## 2025-09-01 RX ADMIN — GABAPENTIN 300 MG: 300 CAPSULE ORAL at 21:20

## 2025-09-01 RX ADMIN — PROPRANOLOL HYDROCHLORIDE 60 MG: 40 TABLET ORAL at 15:56

## 2025-09-01 RX ADMIN — VANCOMYCIN HYDROCHLORIDE 2000 MG: 10 INJECTION, POWDER, LYOPHILIZED, FOR SOLUTION INTRAVENOUS at 19:21

## 2025-09-01 RX ADMIN — GABAPENTIN 300 MG: 300 CAPSULE ORAL at 10:51

## 2025-09-01 RX ADMIN — PRIMIDONE 250 MG: 250 TABLET ORAL at 21:20

## 2025-09-01 RX ADMIN — LEVOTHYROXINE SODIUM 100 MCG: 0.1 TABLET ORAL at 07:57

## 2025-09-01 RX ADMIN — GABAPENTIN 300 MG: 300 CAPSULE ORAL at 15:56

## 2025-09-01 RX ADMIN — ASPIRIN 81 MG: 81 TABLET, DELAYED RELEASE ORAL at 10:52

## 2025-09-01 RX ADMIN — SODIUM CHLORIDE, PRESERVATIVE FREE 10 ML: 5 INJECTION INTRAVENOUS at 10:53

## 2025-09-01 RX ADMIN — LOSARTAN POTASSIUM 25 MG: 50 TABLET, FILM COATED ORAL at 10:51

## 2025-09-01 RX ADMIN — PROPRANOLOL HYDROCHLORIDE 60 MG: 40 TABLET ORAL at 10:49

## 2025-09-01 RX ADMIN — Medication 2000 UNITS: at 10:51

## 2025-09-01 RX ADMIN — BACLOFEN 10 MG: 10 TABLET ORAL at 10:52

## 2025-09-01 RX ADMIN — VENLAFAXINE HYDROCHLORIDE 75 MG: 75 CAPSULE, EXTENDED RELEASE ORAL at 21:19

## 2025-09-01 RX ADMIN — CLONAZEPAM 0.5 MG: 0.5 TABLET ORAL at 21:20

## 2025-09-01 ASSESSMENT — PAIN DESCRIPTION - LOCATION: LOCATION: KNEE

## 2025-09-01 ASSESSMENT — PAIN SCALES - GENERAL
PAINLEVEL_OUTOF10: 5
PAINLEVEL_OUTOF10: 3

## 2025-09-01 ASSESSMENT — PAIN DESCRIPTION - ORIENTATION: ORIENTATION: RIGHT

## 2025-09-01 ASSESSMENT — PAIN - FUNCTIONAL ASSESSMENT: PAIN_FUNCTIONAL_ASSESSMENT: 0-10

## 2025-09-01 ASSESSMENT — PAIN DESCRIPTION - DESCRIPTORS: DESCRIPTORS: ACHING

## 2025-09-02 LAB
ACCESSION NUMBER, LLC1M: 768
ACINETOBACTER CALCOAC BAUMANNII COMPLEX BY PCR: NOT DETECTED
ANION GAP SERPL CALC-SCNC: 7 MMOL/L (ref 2–12)
BACTEROIDES FRAGILIS BY PCR: NOT DETECTED
BASOPHILS # BLD: 0.07 K/UL (ref 0–0.1)
BASOPHILS NFR BLD: 0.8 % (ref 0–1)
BIOFIRE TEST COMMENT: ABNORMAL
BUN SERPL-MCNC: 12 MG/DL (ref 6–20)
BUN/CREAT SERPL: 17 (ref 12–20)
CA-I BLD-MCNC: 9.1 MG/DL (ref 8.5–10.1)
CANDIDA ALBICANS BY PCR: NOT DETECTED
CANDIDA AURIS BY PCR: NOT DETECTED
CANDIDA GLABRATA: NOT DETECTED
CANDIDA KRUSEI BY PCR: NOT DETECTED
CANDIDA PARAPSILOSIS BY PCR: NOT DETECTED
CANDIDA TROPICALIS BY PCR: NOT DETECTED
CHLORIDE SERPL-SCNC: 103 MMOL/L (ref 97–108)
CO2 SERPL-SCNC: 26 MMOL/L (ref 21–32)
CREAT SERPL-MCNC: 0.7 MG/DL (ref 0.55–1.02)
CRYPTOCOCCUS NEOFORMANS/GATTII BY PCR: NOT DETECTED
DIFFERENTIAL METHOD BLD: NORMAL
ENTEROBACTER CLOACAE COMPLEX BY PCR: NOT DETECTED
ENTEROBACTERALES BY PCR: NOT DETECTED
ENTEROCOCCUS FAECALIS BY PCR: NOT DETECTED
ENTEROCOCCUS FAECIUM BY PCR: NOT DETECTED
EOSINOPHIL # BLD: 0.37 K/UL (ref 0–0.4)
EOSINOPHIL NFR BLD: 4.2 % (ref 0–7)
ERYTHROCYTE [DISTWIDTH] IN BLOOD BY AUTOMATED COUNT: 12.5 % (ref 11.5–14.5)
ESCHERICHIA COLI: NOT DETECTED
GLUCOSE BLD STRIP.AUTO-MCNC: 116 MG/DL (ref 65–100)
GLUCOSE BLD STRIP.AUTO-MCNC: 117 MG/DL (ref 65–100)
GLUCOSE BLD STRIP.AUTO-MCNC: 149 MG/DL (ref 65–100)
GLUCOSE BLD STRIP.AUTO-MCNC: 260 MG/DL (ref 65–100)
GLUCOSE SERPL-MCNC: 177 MG/DL (ref 65–100)
HAEM INFLU DNA BLD POS QL NAA+NON-PROBE: NOT DETECTED
HCT VFR BLD AUTO: 36 % (ref 35–47)
HGB BLD-MCNC: 12.3 G/DL (ref 11.5–16)
IMM GRANULOCYTES # BLD AUTO: 0.04 K/UL (ref 0–0.04)
IMM GRANULOCYTES NFR BLD AUTO: 0.5 % (ref 0–0.5)
KLEBSIELLA AEROGENES BY PCR: NOT DETECTED
KLEBSIELLA OXYTOCA BY PCR: NOT DETECTED
KLEBSIELLA PNEUMONIAE GROUP BY PCR: NOT DETECTED
LISTERIA MONOCYTOGENES BY PCR: NOT DETECTED
LYMPHOCYTES # BLD: 2.99 K/UL (ref 0.8–3.5)
LYMPHOCYTES NFR BLD: 34.2 % (ref 12–49)
MCH RBC QN AUTO: 31 PG (ref 26–34)
MCHC RBC AUTO-ENTMCNC: 34.2 G/DL (ref 30–36.5)
MCV RBC AUTO: 90.7 FL (ref 80–99)
MONOCYTES # BLD: 0.86 K/UL (ref 0–1)
MONOCYTES NFR BLD: 9.8 % (ref 5–13)
MRSA DNA SPEC QL NAA+PROBE: NOT DETECTED
NEISSERIA MENINGITIDIS BY PCR: NOT DETECTED
NEUTS SEG # BLD: 4.41 K/UL (ref 1.8–8)
NEUTS SEG NFR BLD: 50.5 % (ref 32–75)
NRBC # BLD: 0 K/UL (ref 0–0.01)
NRBC BLD-RTO: 0 PER 100 WBC
PERFORMED BY:: ABNORMAL
PLATELET # BLD AUTO: 205 K/UL (ref 150–400)
PMV BLD AUTO: 10.8 FL (ref 8.9–12.9)
POTASSIUM SERPL-SCNC: 3.8 MMOL/L (ref 3.5–5.1)
PROTEUS BY PCR: NOT DETECTED
PSEUDOMONAS AERUGINOSA, PSAEP: NOT DETECTED
RBC # BLD AUTO: 3.97 M/UL (ref 3.8–5.2)
RESISTANT GENE TARGETS: ABNORMAL
SALMONELLA DNA BLD POS QL NAA+NON-PROBE: NOT DETECTED
SERRATIA MARCESCENS BY PCR: NOT DETECTED
SODIUM SERPL-SCNC: 136 MMOL/L (ref 136–145)
STAPHYLOCOCCUS AUREUS: NOT DETECTED
STAPHYLOCOCCUS EPIDERMIDIS BY PCR: NOT DETECTED
STAPHYLOCOCCUS LUGDUNENSIS BY PCR: NOT DETECTED
STAPHYLOCOCCUS: DETECTED
STENOTROPHOMONAS MALTOPHILIA BY PCR: NOT DETECTED
STREPTOCOCCUS AGALACTIAE (GROUP B): NOT DETECTED
STREPTOCOCCUS PNEUMONIAE , SPNP: NOT DETECTED
STREPTOCOCCUS PYOGENES (GROUP A), SPYOP: NOT DETECTED
STREPTOCOCCUS: NOT DETECTED
WBC # BLD AUTO: 8.7 K/UL (ref 3.6–11)

## 2025-09-02 PROCEDURE — 85025 COMPLETE CBC W/AUTO DIFF WBC: CPT

## 2025-09-02 PROCEDURE — 6360000002 HC RX W HCPCS: Performed by: INTERNAL MEDICINE

## 2025-09-02 PROCEDURE — 6370000000 HC RX 637 (ALT 250 FOR IP): Performed by: INTERNAL MEDICINE

## 2025-09-02 PROCEDURE — 6360000002 HC RX W HCPCS

## 2025-09-02 PROCEDURE — 6370000000 HC RX 637 (ALT 250 FOR IP)

## 2025-09-02 PROCEDURE — 2500000003 HC RX 250 WO HCPCS: Performed by: INTERNAL MEDICINE

## 2025-09-02 PROCEDURE — 1100000000 HC RM PRIVATE

## 2025-09-02 PROCEDURE — 6370000000 HC RX 637 (ALT 250 FOR IP): Performed by: PHYSICIAN ASSISTANT

## 2025-09-02 PROCEDURE — 94761 N-INVAS EAR/PLS OXIMETRY MLT: CPT

## 2025-09-02 PROCEDURE — 2580000003 HC RX 258: Performed by: INTERNAL MEDICINE

## 2025-09-02 PROCEDURE — 80048 BASIC METABOLIC PNL TOTAL CA: CPT

## 2025-09-02 PROCEDURE — 2580000003 HC RX 258

## 2025-09-02 PROCEDURE — 82962 GLUCOSE BLOOD TEST: CPT

## 2025-09-02 RX ORDER — CLONAZEPAM 0.5 MG/1
0.5 TABLET ORAL ONCE
Status: COMPLETED | OUTPATIENT
Start: 2025-09-02 | End: 2025-09-02

## 2025-09-02 RX ORDER — HYDROXYZINE HYDROCHLORIDE 25 MG/ML
25 INJECTION, SOLUTION INTRAMUSCULAR EVERY 6 HOURS PRN
Status: DISCONTINUED | OUTPATIENT
Start: 2025-09-02 | End: 2025-09-04 | Stop reason: HOSPADM

## 2025-09-02 RX ADMIN — ARIPIPRAZOLE 5 MG: 5 TABLET ORAL at 08:25

## 2025-09-02 RX ADMIN — CLONAZEPAM 0.5 MG: 0.5 TABLET ORAL at 20:14

## 2025-09-02 RX ADMIN — VENLAFAXINE HYDROCHLORIDE 75 MG: 75 CAPSULE, EXTENDED RELEASE ORAL at 08:25

## 2025-09-02 RX ADMIN — VANCOMYCIN HYDROCHLORIDE 750 MG: 750 INJECTION, POWDER, LYOPHILIZED, FOR SOLUTION INTRAVENOUS at 08:31

## 2025-09-02 RX ADMIN — MEROPENEM 1000 MG: 1 INJECTION INTRAVENOUS at 09:24

## 2025-09-02 RX ADMIN — PROPRANOLOL HYDROCHLORIDE 60 MG: 40 TABLET ORAL at 08:24

## 2025-09-02 RX ADMIN — PRIMIDONE 250 MG: 250 TABLET ORAL at 20:15

## 2025-09-02 RX ADMIN — ACETAMINOPHEN 650 MG: 325 TABLET ORAL at 02:50

## 2025-09-02 RX ADMIN — INSULIN LISPRO 2 UNITS: 100 INJECTION, SOLUTION INTRAVENOUS; SUBCUTANEOUS at 20:15

## 2025-09-02 RX ADMIN — LURASIDONE HYDROCHLORIDE 80 MG: 20 TABLET, FILM COATED ORAL at 08:25

## 2025-09-02 RX ADMIN — LEVOTHYROXINE SODIUM 50 MCG: 0.03 TABLET ORAL at 06:35

## 2025-09-02 RX ADMIN — Medication 2000 UNITS: at 08:25

## 2025-09-02 RX ADMIN — SODIUM CHLORIDE, PRESERVATIVE FREE 10 ML: 5 INJECTION INTRAVENOUS at 20:16

## 2025-09-02 RX ADMIN — GABAPENTIN 300 MG: 300 CAPSULE ORAL at 20:15

## 2025-09-02 RX ADMIN — VENLAFAXINE HYDROCHLORIDE 75 MG: 75 CAPSULE, EXTENDED RELEASE ORAL at 14:22

## 2025-09-02 RX ADMIN — VENLAFAXINE HYDROCHLORIDE 75 MG: 75 CAPSULE, EXTENDED RELEASE ORAL at 20:13

## 2025-09-02 RX ADMIN — CYANOCOBALAMIN TAB 1000 MCG 1000 MCG: 1000 TAB at 08:25

## 2025-09-02 RX ADMIN — SODIUM CHLORIDE, PRESERVATIVE FREE 10 ML: 5 INJECTION INTRAVENOUS at 08:27

## 2025-09-02 RX ADMIN — LOSARTAN POTASSIUM 50 MG: 50 TABLET, FILM COATED ORAL at 08:26

## 2025-09-02 RX ADMIN — DICLOFENAC SODIUM 2 G: 10 GEL TOPICAL at 21:15

## 2025-09-02 RX ADMIN — MEROPENEM 1000 MG: 1 INJECTION INTRAVENOUS at 16:04

## 2025-09-02 RX ADMIN — BACLOFEN 10 MG: 10 TABLET ORAL at 20:13

## 2025-09-02 RX ADMIN — CLONAZEPAM 0.5 MG: 0.5 TABLET ORAL at 06:35

## 2025-09-02 RX ADMIN — TRAZODONE HYDROCHLORIDE 150 MG: 50 TABLET ORAL at 20:14

## 2025-09-02 RX ADMIN — BACLOFEN 10 MG: 10 TABLET ORAL at 08:25

## 2025-09-02 RX ADMIN — PROPRANOLOL HYDROCHLORIDE 60 MG: 40 TABLET ORAL at 20:13

## 2025-09-02 RX ADMIN — PRIMIDONE 250 MG: 250 TABLET ORAL at 08:26

## 2025-09-02 RX ADMIN — CLONAZEPAM 0.5 MG: 0.5 TABLET ORAL at 02:55

## 2025-09-02 RX ADMIN — ENOXAPARIN SODIUM 40 MG: 100 INJECTION SUBCUTANEOUS at 08:26

## 2025-09-02 RX ADMIN — PROPRANOLOL HYDROCHLORIDE 60 MG: 40 TABLET ORAL at 14:02

## 2025-09-02 RX ADMIN — ASPIRIN 81 MG: 81 TABLET, DELAYED RELEASE ORAL at 08:25

## 2025-09-02 RX ADMIN — GABAPENTIN 300 MG: 300 CAPSULE ORAL at 14:02

## 2025-09-02 RX ADMIN — PRIMIDONE 250 MG: 250 TABLET ORAL at 14:03

## 2025-09-02 RX ADMIN — METFORMIN 500 MG: 500 TABLET, EXTENDED RELEASE ORAL at 08:24

## 2025-09-02 RX ADMIN — CLONAZEPAM 0.5 MG: 0.5 TABLET ORAL at 08:25

## 2025-09-02 RX ADMIN — GABAPENTIN 300 MG: 300 CAPSULE ORAL at 08:25

## 2025-09-02 RX ADMIN — VANCOMYCIN HYDROCHLORIDE 750 MG: 750 INJECTION, POWDER, LYOPHILIZED, FOR SOLUTION INTRAVENOUS at 19:48

## 2025-09-02 ASSESSMENT — PAIN SCALES - GENERAL
PAINLEVEL_OUTOF10: 4
PAINLEVEL_OUTOF10: 0
PAINLEVEL_OUTOF10: 1

## 2025-09-02 ASSESSMENT — PAIN - FUNCTIONAL ASSESSMENT
PAIN_FUNCTIONAL_ASSESSMENT: 0-10
PAIN_FUNCTIONAL_ASSESSMENT: 0-10

## 2025-09-02 ASSESSMENT — PAIN DESCRIPTION - LOCATION: LOCATION: HEAD

## 2025-09-02 ASSESSMENT — PAIN DESCRIPTION - DESCRIPTORS: DESCRIPTORS: ACHING

## 2025-09-03 LAB
ANION GAP SERPL CALC-SCNC: 5 MMOL/L (ref 2–12)
BACTERIA SPEC CULT: ABNORMAL
BUN SERPL-MCNC: 11 MG/DL (ref 6–20)
BUN/CREAT SERPL: 18 (ref 12–20)
CA-I BLD-MCNC: 9.2 MG/DL (ref 8.5–10.1)
CHLORIDE SERPL-SCNC: 105 MMOL/L (ref 97–108)
CO2 SERPL-SCNC: 27 MMOL/L (ref 21–32)
COLONY COUNT, CNT: ABNORMAL
CREAT SERPL-MCNC: 0.62 MG/DL (ref 0.55–1.02)
DATE LAST DOSE: NORMAL
DOSE AMOUNT: NORMAL UNITS
GLUCOSE BLD STRIP.AUTO-MCNC: 117 MG/DL (ref 65–100)
GLUCOSE BLD STRIP.AUTO-MCNC: 120 MG/DL (ref 65–100)
GLUCOSE BLD STRIP.AUTO-MCNC: 129 MG/DL (ref 65–100)
GLUCOSE BLD STRIP.AUTO-MCNC: 146 MG/DL (ref 65–100)
GLUCOSE SERPL-MCNC: 124 MG/DL (ref 65–100)
Lab: ABNORMAL
Lab: ABNORMAL
PERFORMED BY:: ABNORMAL
POTASSIUM SERPL-SCNC: 4.2 MMOL/L (ref 3.5–5.1)
SODIUM SERPL-SCNC: 137 MMOL/L (ref 136–145)
VANCOMYCIN SERPL-MCNC: 13 UG/ML

## 2025-09-03 PROCEDURE — 80048 BASIC METABOLIC PNL TOTAL CA: CPT

## 2025-09-03 PROCEDURE — 6370000000 HC RX 637 (ALT 250 FOR IP): Performed by: INTERNAL MEDICINE

## 2025-09-03 PROCEDURE — 97530 THERAPEUTIC ACTIVITIES: CPT

## 2025-09-03 PROCEDURE — 97110 THERAPEUTIC EXERCISES: CPT

## 2025-09-03 PROCEDURE — 6360000002 HC RX W HCPCS

## 2025-09-03 PROCEDURE — 97116 GAIT TRAINING THERAPY: CPT

## 2025-09-03 PROCEDURE — 2500000003 HC RX 250 WO HCPCS: Performed by: INTERNAL MEDICINE

## 2025-09-03 PROCEDURE — 6360000002 HC RX W HCPCS: Performed by: INTERNAL MEDICINE

## 2025-09-03 PROCEDURE — 36415 COLL VENOUS BLD VENIPUNCTURE: CPT

## 2025-09-03 PROCEDURE — 80202 ASSAY OF VANCOMYCIN: CPT

## 2025-09-03 PROCEDURE — 1100000000 HC RM PRIVATE

## 2025-09-03 PROCEDURE — 82962 GLUCOSE BLOOD TEST: CPT

## 2025-09-03 PROCEDURE — 2580000003 HC RX 258

## 2025-09-03 RX ADMIN — ARIPIPRAZOLE 5 MG: 5 TABLET ORAL at 09:48

## 2025-09-03 RX ADMIN — MEROPENEM 1000 MG: 1 INJECTION INTRAVENOUS at 16:52

## 2025-09-03 RX ADMIN — MEROPENEM 1000 MG: 1 INJECTION INTRAVENOUS at 00:35

## 2025-09-03 RX ADMIN — LEVOTHYROXINE SODIUM 50 MCG: 0.03 TABLET ORAL at 06:17

## 2025-09-03 RX ADMIN — BACLOFEN 10 MG: 10 TABLET ORAL at 21:57

## 2025-09-03 RX ADMIN — METFORMIN 500 MG: 500 TABLET, EXTENDED RELEASE ORAL at 09:52

## 2025-09-03 RX ADMIN — GABAPENTIN 300 MG: 300 CAPSULE ORAL at 09:48

## 2025-09-03 RX ADMIN — BACLOFEN 10 MG: 10 TABLET ORAL at 09:48

## 2025-09-03 RX ADMIN — LOSARTAN POTASSIUM 50 MG: 50 TABLET, FILM COATED ORAL at 09:48

## 2025-09-03 RX ADMIN — PRIMIDONE 250 MG: 250 TABLET ORAL at 13:40

## 2025-09-03 RX ADMIN — PROPRANOLOL HYDROCHLORIDE 60 MG: 40 TABLET ORAL at 21:57

## 2025-09-03 RX ADMIN — VENLAFAXINE HYDROCHLORIDE 75 MG: 75 CAPSULE, EXTENDED RELEASE ORAL at 13:40

## 2025-09-03 RX ADMIN — PROPRANOLOL HYDROCHLORIDE 60 MG: 40 TABLET ORAL at 14:30

## 2025-09-03 RX ADMIN — MEROPENEM 1000 MG: 1 INJECTION INTRAVENOUS at 09:47

## 2025-09-03 RX ADMIN — VENLAFAXINE HYDROCHLORIDE 75 MG: 75 CAPSULE, EXTENDED RELEASE ORAL at 09:48

## 2025-09-03 RX ADMIN — DICLOFENAC SODIUM 2 G: 10 GEL TOPICAL at 09:56

## 2025-09-03 RX ADMIN — VANCOMYCIN HYDROCHLORIDE 750 MG: 750 INJECTION, POWDER, LYOPHILIZED, FOR SOLUTION INTRAVENOUS at 09:42

## 2025-09-03 RX ADMIN — LURASIDONE HYDROCHLORIDE 80 MG: 20 TABLET, FILM COATED ORAL at 09:48

## 2025-09-03 RX ADMIN — PRIMIDONE 250 MG: 250 TABLET ORAL at 09:48

## 2025-09-03 RX ADMIN — PROPRANOLOL HYDROCHLORIDE 60 MG: 40 TABLET ORAL at 09:52

## 2025-09-03 RX ADMIN — PRIMIDONE 250 MG: 250 TABLET ORAL at 21:56

## 2025-09-03 RX ADMIN — VENLAFAXINE HYDROCHLORIDE 75 MG: 75 CAPSULE, EXTENDED RELEASE ORAL at 21:56

## 2025-09-03 RX ADMIN — ASPIRIN 81 MG: 81 TABLET, DELAYED RELEASE ORAL at 09:47

## 2025-09-03 RX ADMIN — CYANOCOBALAMIN TAB 1000 MCG 1000 MCG: 1000 TAB at 09:47

## 2025-09-03 RX ADMIN — GABAPENTIN 300 MG: 300 CAPSULE ORAL at 21:56

## 2025-09-03 RX ADMIN — TRAZODONE HYDROCHLORIDE 150 MG: 50 TABLET ORAL at 21:56

## 2025-09-03 RX ADMIN — ENOXAPARIN SODIUM 40 MG: 100 INJECTION SUBCUTANEOUS at 09:49

## 2025-09-03 RX ADMIN — CLONAZEPAM 0.5 MG: 0.5 TABLET ORAL at 21:56

## 2025-09-03 RX ADMIN — Medication 2000 UNITS: at 09:48

## 2025-09-03 RX ADMIN — CLONAZEPAM 0.5 MG: 0.5 TABLET ORAL at 09:47

## 2025-09-03 RX ADMIN — SODIUM CHLORIDE, PRESERVATIVE FREE 10 ML: 5 INJECTION INTRAVENOUS at 09:48

## 2025-09-03 RX ADMIN — GABAPENTIN 300 MG: 300 CAPSULE ORAL at 13:40

## 2025-09-03 RX ADMIN — ACETAMINOPHEN 650 MG: 325 TABLET ORAL at 22:00

## 2025-09-03 ASSESSMENT — PAIN SCALES - GENERAL
PAINLEVEL_OUTOF10: 0
PAINLEVEL_OUTOF10: 3

## 2025-09-03 ASSESSMENT — PAIN - FUNCTIONAL ASSESSMENT
PAIN_FUNCTIONAL_ASSESSMENT: 0-10

## 2025-09-04 VITALS
RESPIRATION RATE: 16 BRPM | HEART RATE: 66 BPM | WEIGHT: 173.28 LBS | TEMPERATURE: 98.4 F | SYSTOLIC BLOOD PRESSURE: 169 MMHG | HEIGHT: 62 IN | BODY MASS INDEX: 31.89 KG/M2 | DIASTOLIC BLOOD PRESSURE: 84 MMHG | OXYGEN SATURATION: 94 %

## 2025-09-04 LAB
ANION GAP SERPL CALC-SCNC: 6 MMOL/L (ref 2–12)
BUN SERPL-MCNC: 12 MG/DL (ref 6–20)
BUN/CREAT SERPL: 19 (ref 12–20)
CA-I BLD-MCNC: 9 MG/DL (ref 8.5–10.1)
CHLORIDE SERPL-SCNC: 107 MMOL/L (ref 97–108)
CO2 SERPL-SCNC: 26 MMOL/L (ref 21–32)
CREAT SERPL-MCNC: 0.63 MG/DL (ref 0.55–1.02)
GLUCOSE BLD STRIP.AUTO-MCNC: 141 MG/DL (ref 65–100)
GLUCOSE BLD STRIP.AUTO-MCNC: 208 MG/DL (ref 65–100)
GLUCOSE BLD STRIP.AUTO-MCNC: 88 MG/DL (ref 65–100)
GLUCOSE SERPL-MCNC: 126 MG/DL (ref 65–100)
PERFORMED BY:: ABNORMAL
PERFORMED BY:: ABNORMAL
PERFORMED BY:: NORMAL
POTASSIUM SERPL-SCNC: 4 MMOL/L (ref 3.5–5.1)
SODIUM SERPL-SCNC: 139 MMOL/L (ref 136–145)

## 2025-09-04 PROCEDURE — 80048 BASIC METABOLIC PNL TOTAL CA: CPT

## 2025-09-04 PROCEDURE — 6360000002 HC RX W HCPCS: Performed by: INTERNAL MEDICINE

## 2025-09-04 PROCEDURE — 82962 GLUCOSE BLOOD TEST: CPT

## 2025-09-04 PROCEDURE — 6360000002 HC RX W HCPCS

## 2025-09-04 PROCEDURE — 36415 COLL VENOUS BLD VENIPUNCTURE: CPT

## 2025-09-04 PROCEDURE — 2580000003 HC RX 258

## 2025-09-04 PROCEDURE — 6370000000 HC RX 637 (ALT 250 FOR IP): Performed by: INTERNAL MEDICINE

## 2025-09-04 RX ORDER — CIPROFLOXACIN 500 MG/1
500 TABLET, FILM COATED ORAL 2 TIMES DAILY
Qty: 20 TABLET | Refills: 0 | Status: SHIPPED | OUTPATIENT
Start: 2025-09-04 | End: 2025-09-04

## 2025-09-04 RX ORDER — CIPROFLOXACIN 500 MG/1
500 TABLET, FILM COATED ORAL 2 TIMES DAILY
Qty: 20 TABLET | Refills: 0 | Status: SHIPPED | OUTPATIENT
Start: 2025-09-04 | End: 2025-09-14

## 2025-09-04 RX ADMIN — Medication 2000 UNITS: at 09:02

## 2025-09-04 RX ADMIN — CYANOCOBALAMIN TAB 1000 MCG 1000 MCG: 1000 TAB at 08:59

## 2025-09-04 RX ADMIN — PROPRANOLOL HYDROCHLORIDE 60 MG: 40 TABLET ORAL at 09:01

## 2025-09-04 RX ADMIN — VENLAFAXINE HYDROCHLORIDE 75 MG: 75 CAPSULE, EXTENDED RELEASE ORAL at 08:59

## 2025-09-04 RX ADMIN — BACLOFEN 10 MG: 10 TABLET ORAL at 09:01

## 2025-09-04 RX ADMIN — ENOXAPARIN SODIUM 40 MG: 100 INJECTION SUBCUTANEOUS at 09:04

## 2025-09-04 RX ADMIN — LEVOTHYROXINE SODIUM 50 MCG: 0.03 TABLET ORAL at 06:25

## 2025-09-04 RX ADMIN — MEROPENEM 1000 MG: 1 INJECTION INTRAVENOUS at 09:06

## 2025-09-04 RX ADMIN — METFORMIN 500 MG: 500 TABLET, EXTENDED RELEASE ORAL at 08:59

## 2025-09-04 RX ADMIN — LOSARTAN POTASSIUM 50 MG: 50 TABLET, FILM COATED ORAL at 09:02

## 2025-09-04 RX ADMIN — PRIMIDONE 250 MG: 250 TABLET ORAL at 13:24

## 2025-09-04 RX ADMIN — CLONAZEPAM 0.5 MG: 0.5 TABLET ORAL at 09:02

## 2025-09-04 RX ADMIN — VENLAFAXINE HYDROCHLORIDE 75 MG: 75 CAPSULE, EXTENDED RELEASE ORAL at 13:24

## 2025-09-04 RX ADMIN — INSULIN LISPRO 2 UNITS: 100 INJECTION, SOLUTION INTRAVENOUS; SUBCUTANEOUS at 09:04

## 2025-09-04 RX ADMIN — PROPRANOLOL HYDROCHLORIDE 60 MG: 40 TABLET ORAL at 14:05

## 2025-09-04 RX ADMIN — ARIPIPRAZOLE 5 MG: 5 TABLET ORAL at 09:02

## 2025-09-04 RX ADMIN — PRIMIDONE 250 MG: 250 TABLET ORAL at 09:02

## 2025-09-04 RX ADMIN — ASPIRIN 81 MG: 81 TABLET, DELAYED RELEASE ORAL at 09:01

## 2025-09-04 RX ADMIN — GABAPENTIN 300 MG: 300 CAPSULE ORAL at 09:02

## 2025-09-04 RX ADMIN — LURASIDONE HYDROCHLORIDE 80 MG: 20 TABLET, FILM COATED ORAL at 08:59

## 2025-09-04 RX ADMIN — MEROPENEM 1000 MG: 1 INJECTION INTRAVENOUS at 00:26

## 2025-09-04 RX ADMIN — GABAPENTIN 300 MG: 300 CAPSULE ORAL at 13:24

## 2025-09-06 LAB
BACTERIA SPEC CULT: NORMAL
Lab: NORMAL